# Patient Record
Sex: FEMALE | Race: WHITE | NOT HISPANIC OR LATINO | Employment: FULL TIME | URBAN - METROPOLITAN AREA
[De-identification: names, ages, dates, MRNs, and addresses within clinical notes are randomized per-mention and may not be internally consistent; named-entity substitution may affect disease eponyms.]

---

## 2016-10-26 LAB — CRC RECOMMENDATION EXT: NORMAL

## 2017-02-07 NOTE — TELEPHONE ENCOUNTER
----- Message from Farhana Gordillo sent at 2/7/2017 10:30 AM CST -----  Contact: Ximena/ CRISTIANE/ [phone]   Type: Rx    Name of medication(s): trazodone (DESYREL) 50 MG tablet    Is this a refill? New rx? Refill     Who prescribed medication? Dr. Lewis     Pharmacy Name, Phone, & Location: [facility] on file     Comments: Ximena is calling to have a refill on the medication above. Please call and advise       Thank you

## 2017-02-07 NOTE — TELEPHONE ENCOUNTER
----- Message from Josefaangelina Bourgeois sent at 2/7/2017  1:09 PM CST -----  Contact: Self/[phone] cell  Type: Rx    Name of medication(s): trazodone (DESYREL) 50 MG tablet    Is this a refill? New rx?refill    Who prescribed medication?    Pharmacy Name, Phone, & Location:[facility] on file    Comments:Patient is calling to request a refill on medication above. She would like a new Rx for 90 day supplies be sent to her pharmacy. Please call and advise.    Thank you!

## 2017-02-08 RX ORDER — TRAZODONE HYDROCHLORIDE 50 MG/1
50 TABLET ORAL NIGHTLY
Qty: 90 TABLET | Refills: 3 | Status: SHIPPED | OUTPATIENT
Start: 2017-02-08 | End: 2018-02-13 | Stop reason: SDUPTHER

## 2017-03-14 ENCOUNTER — OFFICE VISIT (OUTPATIENT)
Dept: OTOLARYNGOLOGY | Facility: CLINIC | Age: 47
End: 2017-03-14
Payer: COMMERCIAL

## 2017-03-14 VITALS
SYSTOLIC BLOOD PRESSURE: 125 MMHG | WEIGHT: 167.56 LBS | HEART RATE: 86 BPM | HEIGHT: 64 IN | BODY MASS INDEX: 28.6 KG/M2 | DIASTOLIC BLOOD PRESSURE: 76 MMHG

## 2017-03-14 DIAGNOSIS — J32.2 CHRONIC ETHMOIDAL SINUSITIS: ICD-10-CM

## 2017-03-14 DIAGNOSIS — J34.3 NASAL TURBINATE HYPERTROPHY: ICD-10-CM

## 2017-03-14 DIAGNOSIS — J31.0 CHRONIC RHINITIS: Primary | ICD-10-CM

## 2017-03-14 PROCEDURE — 31231 NASAL ENDOSCOPY DX: CPT | Mod: S$GLB,,, | Performed by: OTOLARYNGOLOGY

## 2017-03-14 PROCEDURE — 99999 PR PBB SHADOW E&M-EST. PATIENT-LVL III: CPT | Mod: PBBFAC,,, | Performed by: OTOLARYNGOLOGY

## 2017-03-14 PROCEDURE — 99213 OFFICE O/P EST LOW 20 MIN: CPT | Mod: 25,S$GLB,, | Performed by: OTOLARYNGOLOGY

## 2017-03-14 PROCEDURE — 1160F RVW MEDS BY RX/DR IN RCRD: CPT | Mod: S$GLB,,, | Performed by: OTOLARYNGOLOGY

## 2017-03-14 RX ORDER — AZELASTINE HYDROCHLORIDE, FLUTICASONE PROPIONATE 137; 50 UG/1; UG/1
1 SPRAY, METERED NASAL 2 TIMES DAILY
Qty: 23 G | Refills: 2 | Status: SHIPPED | OUTPATIENT
Start: 2017-03-14 | End: 2018-03-16

## 2017-03-14 RX ORDER — OMEPRAZOLE 20 MG/1
20 CAPSULE, DELAYED RELEASE ORAL DAILY
COMMUNITY
End: 2018-03-19 | Stop reason: ALTCHOICE

## 2017-03-14 NOTE — PROGRESS NOTES
"  Subjective:      Chaya is a 47 y.o. female who comes for follow-up of CRS.  Nearly 9 months s/p ESS, BITR, PITH rsxn.  Overall fine, had sinus infection in January that resolved.  Now baseline feeling of "swelling" in nasal passage and mildly in the cheeks, greater on left.  Sneezes a couple of times a day at work, not at home.  Stopped flonase and saline, using nothing really now.    SNOT-22 score = 21, NOSE score = 60%    The patient's medications, allergies, past medical, surgical, social and family histories were reviewed and updated as appropriate.    A detailed review of systems was obtained with pertinent positives as per the above HPI, and otherwise negative.        Objective:     /76  Pulse 86  Ht 5' 4" (1.626 m)  Wt 76 kg (167 lb 8.8 oz)  BMI 28.76 kg/m2       Constitutional:   She appears well-developed. She is cooperative.     Head:  Normocephalic.     Nose:  No mucosal edema, rhinorrhea, septal deviation or polyps. No epistaxis. Turbinate hypertrophy.  Turbinates normal and no turbinate masses.  Right sinus exhibits maxillary sinus tenderness. Right sinus exhibits no frontal sinus tenderness. Left sinus exhibits maxillary sinus tenderness. Left sinus exhibits no frontal sinus tenderness.     Mouth/Throat  Oropharynx clear and moist without lesions or asymmetry. No oropharyngeal exudate or posterior oropharyngeal erythema.     Neck:  No adenopathy. Normal range of motion present.     She has no cervical adenopathy.       Procedure    Nasal endoscopy performed.  See procedure note.     Left nasal valve     Left MT with crust     Left ethmoid     Left ET     Right nasal valve     Right MT with crust     Right ethmoid     Right ET        Data Reviewed    WBC (K/uL)   Date Value   04/03/2016 9.34     Eosinophil% (%)   Date Value   04/03/2016 0.7     Eos # (K/uL)   Date Value   04/03/2016 0.1     Platelets (K/uL)   Date Value   04/03/2016 226     Glucose (mg/dL)   Date Value   04/03/2016 84 "     No results found for: IGE    Pathology report indicated non-eosinophilic chronic inflammation.      Assessment:     1. Chronic rhinitis    2. Chronic ethmoidal sinusitis    3. Nasal turbinate hypertrophy         Plan:     Rx Dymista.  Saline rinse regularly and prn.  Call if another infection.  Return if symptoms worsen or fail to improve.

## 2017-03-14 NOTE — PROCEDURES
Nasal/sinus endoscopy  Date/Time: 3/14/2017 9:15 AM  Performed by: DOMO RONQUILLO  Authorized by: DOMO RONQUILLO     Consent Done?:  Yes (Verbal)  Anesthesia:     Local anesthetic:  Lidocaine 4% and Eliecer-Synephrine 1/2%    Patient tolerance:  Patient tolerated the procedure well with no immediate complications  Nose:     Procedure Performed:  Nasal Endoscopy  External:      No external nasal deformity  Intranasal:      Mucosa no polyps     Mucosa ulcers not present     No mucosa lesions present     Enlarged turbinates     No septum gross deformity  Nasopharynx:      No mucosa lesions     Adenoids not present     Posterior choanae patent     Eustachian tube patent     Sinuses all healed.  Mild crusting around MT heads.  ITs edematous.

## 2018-03-15 RX ORDER — TRAZODONE HYDROCHLORIDE 50 MG/1
TABLET ORAL
Qty: 90 TABLET | Refills: 3 | Status: SHIPPED | OUTPATIENT
Start: 2018-03-15 | End: 2021-02-02

## 2018-03-16 ENCOUNTER — OFFICE VISIT (OUTPATIENT)
Dept: ALLERGY | Facility: CLINIC | Age: 48
End: 2018-03-16
Payer: COMMERCIAL

## 2018-03-16 VITALS — BODY MASS INDEX: 26.2 KG/M2 | HEIGHT: 64 IN | WEIGHT: 153.44 LBS

## 2018-03-16 DIAGNOSIS — R76.0 ABNORMAL ANTIBODY TITER: Primary | ICD-10-CM

## 2018-03-16 DIAGNOSIS — J31.0 OTHER CHRONIC RHINITIS: ICD-10-CM

## 2018-03-16 DIAGNOSIS — K21.9 LARYNGOPHARYNGEAL REFLUX: ICD-10-CM

## 2018-03-16 DIAGNOSIS — J32.9 CHRONIC SINUSITIS, UNSPECIFIED LOCATION: ICD-10-CM

## 2018-03-16 DIAGNOSIS — K21.9 GASTROESOPHAGEAL REFLUX DISEASE WITHOUT ESOPHAGITIS: ICD-10-CM

## 2018-03-16 PROCEDURE — 99999 PR PBB SHADOW E&M-EST. PATIENT-LVL III: CPT | Mod: PBBFAC,,, | Performed by: ALLERGY & IMMUNOLOGY

## 2018-03-16 PROCEDURE — 99244 OFF/OP CNSLTJ NEW/EST MOD 40: CPT | Mod: S$GLB,,, | Performed by: ALLERGY & IMMUNOLOGY

## 2018-03-16 RX ORDER — NORETHINDRONE ACETATE AND ETHINYL ESTRADIOL 1MG-20(21)
1 KIT ORAL DAILY
COMMUNITY
End: 2018-04-02

## 2018-03-16 RX ORDER — HYDROXYCHLOROQUINE SULFATE 200 MG/1
200 TABLET, FILM COATED ORAL DAILY
COMMUNITY
End: 2022-12-16

## 2018-03-16 NOTE — LETTER
March 16, 2018      Talia Norris MD  [address]  [address]  [facility] [address]           [facility]  [address]  [facility] [address]  Phone: [phone]  Fax: [phone]          Patient: Chaya Dee   MR Number: [id number]   YOB: 1970   Date of Visit: 3/16/2018       Dear Dr. Talia Norris:    Thank you for referring Chaya Dee to me for evaluation. Attached you will find relevant portions of my assessment and plan of care.    If you have questions, please do not hesitate to call me. I look forward to following Chaya Dee along with you.    Sincerely,    RAÚL Osuna III, MD    Enclosure  CC:  No Recipients    If you would like to receive this communication electronically, please contact [email or web address] or [phone] to request more information on [employer] Link access.    For providers and/or their staff who would like to refer a patient to [facility], please contact us through our one-stop-shop provider referral line, [facility], at [phone].    If you feel you have received this communication in error or would no longer like to receive these types of communications, please e-mail [email or web address]

## 2018-03-16 NOTE — PROGRESS NOTES
"Chaya Dee is referred by Dr. Talia Norris for a consult regarding chronic rhinitis and a possible immunodeficiency. She is here alone.    She has had chronic recurrent rhinitis and frequent sinus infections since childhood. She saw Dr. Christiano Tran at [facility] in the late 1990s and was skin tested. She thinks she only had a reaction to cockroach.    She had her first sinus surgery in the early 2000's. She had surgery with Dr. Donato in July 2009 for a deviated nasal septum and hypertrophied nasal turbinates.    She had another sinus surgery with Dr. Lomeli on June 24, 2016. She continues to have about 2-3 "sinus infections" a year.    She continues to have frequent headaches, pruritus of the nose, eyes, ears, and throat, ear fullness and pressure, sneezing, clear rhinorrhea, postnasal drip, bilateral nasal congestion that alternates, frequent throat clearing, sore throats, hoarseness, and a sensation that something is in the back of the throat. She only occasionally a cough. She denies any wheezing or shortness of breath. She denies any history of asthma.    She has been told twice by a physician in an urgent care center that she had fluid in her ears.    She occasionally has a rash on her chest and face after eating certain foods she thinks that contain MSG. She may also have mild swelling of her lips. She takes Benadryl with resolution.    She does have gastroesophageal reflux disease and takes omeprazole 20 mg a day. If she misses any doses she will have significant heartburn. She may get acid in her mouth. She had an EGD in 2016 that she thinks was normal.    In the past year she developed joint pain and stiffness in multiple joints. She also has had numbness and tingling in her hands and toes. She has had dry eyes or dry mouth. She had lab work with Dr. Norris that showed a low immune protein level. She believes that her rheumatoid factor and DANE were negative. She has been on Plaquenil for several " months with an improvement in her symptoms.    For ROS, FH, SH please see Allergy and Asthma Questionnaire dated today.    Some relevant pertinent positives:    Review of Systems:  She has recurrent nausea, vomiting, diarrhea, and abdominal pain. She associates these with her reflux. She has several nodules on her hands and is followed by Dr. Isabel Mckinney. She had scarlet fever in college.  She has had low vitamin D levels and is taking 5000 units a day as replacement. She also has had a low vitamin B12 and takes replacement injections once a week.    Family History: Her mother has rhinitis and a food allergy.    Home environment: She has lived in the same house for the past 11 years. There was no water damage. There is no evidence of mold. There is carpeting in the bedroom. There is 1 dog that lives in and out of the house.    Social History: She is a nonsmoker. She is an  at [employer]. Her family has a house in [place], [place]. She went to college at [employer].    Physical Examination:  General: Well-developed, well-nourished, no acute distress.  Head: No sinus tenderness.  Eyes: Conjunctivae:  No bulbar or palpebral conjunctival injection.  Ears: EAC's clear.  TM's clear.  No pre-auricular nodes.  Nose: Nasal Mucosa:  Pink.  Septum: No apparent deviation.  Turbinates:  No significant edema.  Polyps/Mass:  None visible.  Teeth/Gums:  No bleeding noted.  Oropharynx: No exudates.  Neck: Supple without thyromegaly. No cervical lymphadenopathy.    Respiratory/Chest: Effort: Good.  Auscultation:  Clear bilaterally.  Cardiovascular:  No murmur, rubs, or gallop heard.   GI:  Non-tender.  No masses.  No organomegaly.  Extremities:  No swelling.  No cyanosis, clubbing, or edema.  Skin: Good turgor.  No urticaria or angioedema.  Neuro/Psych: Oriented x 3.    Laboratory 2018:  CBC: Normal.  CMP: Bilirubin 1.1.  Ig.  IgM: 21.  IgA: 146.  IgG1: 397.  IgG2: 316.  IgG3: 18 (normal   mg/dL).  IgG4: 23.  ESR: 0.  C-reactive protein: 0.6.  Urinalysis: Normal.    Assessment:  1. Chronic rhinitis, consider allergic.  2. Chronic conjunctivitis, consider allergic.  3. Recurrent sinus infections.  4. GERD.  5. 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  7. Unknown rheumatologic disorder on Plaquenil.    Recommendations:  1. Laboratory as ordered.  2. ENT evaluation for LPR.  3. Return to clinic in 2-3 weeks.  4. Letter to Dr. Norris on return to clinic.    LPR and website were reviewed.

## 2018-03-19 ENCOUNTER — OFFICE VISIT (OUTPATIENT)
Dept: OTOLARYNGOLOGY | Facility: CLINIC | Age: 48
End: 2018-03-19
Payer: COMMERCIAL

## 2018-03-19 ENCOUNTER — CLINICAL SUPPORT (OUTPATIENT)
Dept: AUDIOLOGY | Facility: CLINIC | Age: 48
End: 2018-03-19
Payer: COMMERCIAL

## 2018-03-19 VITALS — BODY MASS INDEX: 26.34 KG/M2 | WEIGHT: 154.31 LBS | HEIGHT: 64 IN

## 2018-03-19 DIAGNOSIS — G89.29 CHRONIC INTRACTABLE HEADACHE, UNSPECIFIED HEADACHE TYPE: ICD-10-CM

## 2018-03-19 DIAGNOSIS — R51.9 CHRONIC INTRACTABLE HEADACHE, UNSPECIFIED HEADACHE TYPE: ICD-10-CM

## 2018-03-19 DIAGNOSIS — K21.9 LPRD (LARYNGOPHARYNGEAL REFLUX DISEASE): Primary | ICD-10-CM

## 2018-03-19 DIAGNOSIS — R09.89 CHRONIC THROAT CLEARING: ICD-10-CM

## 2018-03-19 DIAGNOSIS — Z98.890 HISTORY OF ENDOSCOPIC SINUS SURGERY: ICD-10-CM

## 2018-03-19 DIAGNOSIS — R51.9 FACIAL PAIN: ICD-10-CM

## 2018-03-19 DIAGNOSIS — H69.90 DYSFUNCTION OF EUSTACHIAN TUBE, UNSPECIFIED LATERALITY: Primary | ICD-10-CM

## 2018-03-19 DIAGNOSIS — H93.8X3 EAR PRESSURE, BILATERAL: Primary | ICD-10-CM

## 2018-03-19 PROCEDURE — 99213 OFFICE O/P EST LOW 20 MIN: CPT | Mod: 25,S$GLB,, | Performed by: NURSE PRACTITIONER

## 2018-03-19 PROCEDURE — 31575 DIAGNOSTIC LARYNGOSCOPY: CPT | Mod: S$GLB,,, | Performed by: NURSE PRACTITIONER

## 2018-03-19 PROCEDURE — 99999 PR PBB SHADOW E&M-EST. PATIENT-LVL IV: CPT | Mod: PBBFAC,,, | Performed by: NURSE PRACTITIONER

## 2018-03-19 PROCEDURE — 92567 TYMPANOMETRY: CPT | Mod: S$GLB,,, | Performed by: AUDIOLOGIST-HEARING AID FITTER

## 2018-03-19 RX ORDER — OMEPRAZOLE 40 MG/1
40 CAPSULE, DELAYED RELEASE ORAL
Qty: 30 CAPSULE | Refills: 11 | Status: SHIPPED | OUTPATIENT
Start: 2018-03-19 | End: 2021-02-02

## 2018-03-19 NOTE — PATIENT INSTRUCTIONS
"  How Acid Reflux Affects Your Throat    Do you have to clear your throat or cough often? Are you hoarse? Do you have trouble swallowing? If you have these or other throat symptoms, you may have acid reflux. This occurs when stomach acid flows back up and irritates your throat.  Why you have throat symptoms  There are muscles (esophageal sphincters) at both ends of the tube that carries food to your stomach (the esophagus). These muscles relax to let food pass. Then they tighten to keep stomach acid down. When the lower esophageal sphincter (LES) doesnt tighten enough, acid can flow back (reflux) from your stomach into your esophagus. This may cause heartburn. In some cases the upper esophageal sphincter (UES) also doesnt work well. Then acid can travel higher and enter your throat (pharynx). In many cases, this causes throat symptoms.  Common throat symptoms  · Need to clear your throat often  · Feeling like youre choking  · Long-term (chronic) cough  · Hoarseness  · Trouble swallowing  · Feel like you have a lump in your throat  · Sour or acid taste  · Sore throat that keeps coming back     LARYNGOPHARYNGEAL REFLUX  (SILENT OR ATYPICAL REFLUX)    If you have any of the following symptoms you may have laryngopharyngeal reflux (LPR):  hoarseness, thick or too much mucus, chronic throat pain/irritation, chronic throat clearing, chronic cough, especially cough that wake you up from sleep, chronic "postnasal drip" without the need to blow your nose.     Many people with LPR do not have symptoms of heartburn. Compared to the esophagus, the voice box and the back of the throat are significantly more sensitive to the effects of acid on surrounding tissue. Acid passing quickly through the esophagus does not have a chance to irritate the area for too long.  However acid that pools in the throat or voice box can cause prolonged irritation resulting in the symptoms of LPR.    The symptoms of LPR can consist of a dry cough " "and the sensation of something being stuck in the throat.  Some people will complain of heartburn while others may have intermittent hoarseness or loss of voice.  Another major symptom of LPR is "postnasal drip."  Patients are often told symptoms are due to abnormal nasal drainage or sinus infection; however this is rarely the cause of chronic throat irritation. For post nasal drip to cause the complaints described, signs and symptoms of an active nasal infection should be present.     Treatments for LPR include:  postural changes, weight reduction, diet modification, medication to reduce stomach acid and promote normal motility, and surgery to prevent reflux. Most patients will begin to notice some relief in her symptoms about 2 weeks after starting the medication; however it is generally recommended the medication should be continued for 2 months. If the symptoms completely resolve, the medication can then be tapered.  Some people will remain symptom free while others may have relapses which required treatment again.    Things you can do to prevent reflux include:  Do not smoke.  Smoking will cause reflux.  Avoid tight fitting clothes or belts around the waist.  Avoid eating at least 2 hours prior to bedtime.  In fact avoid eating your largest meal at night.  Weight loss.  For patient's with recent weight gain, shedding a few pounds is all that is required to improve reflux.  Avoid caffeine, cola beverages, citrus beverages, mints, alcoholic beverages, particularly at night, cheese, fried foods, spicy foods, eggs, and chocolate.  Sleep with the head of bed elevated at least 6 inches.    Recommendations:    Take Nexium / Prilosec (PPI) every morning on an empty stomach (30-60 minutes before eating) 40 MG.   At bedtime take Zantac (H2-blocker) 150-300 mg.    After 4-8 weeks, with significant symptomatic improvement, you may begin weaning your reflux medications down:  Nexium / Prilosec 40 mg --> to 20 mg " (over-the-counter strength)  Zantac 300 mg --> to 150 mg (over-the-counter stength)  See a Gastro doctor (GI) for refractory symptoms and continued management.

## 2018-03-19 NOTE — LETTER
March 19, 2018      RAÚL Osuna III, MD  [address]  [facility] [address]           [facility]  [address] [facility] [address] [place] [address]  Phone: [phone]  Fax: [phone]          Patient: Chaya Dee   MR Number: [id number]   YOB: 1970   Date of Visit: 3/19/2018       Dear Dr. RAÚL Osuna III:    Thank you for referring Chaya Dee to me for evaluation. Attached you will find relevant portions of my assessment and plan of care.    If you have questions, please do not hesitate to call me. I look forward to following Chaya Dee along with you.    Sincerely,    Bronwyn Mariee, ESPERANZA    Enclosure  CC:  No Recipients    If you would like to receive this communication electronically, please contact [email or web address] or [phone] to request more information on [employer] Link access.    For providers and/or their staff who would like to refer a patient to [facility], please contact us through our one-stop-shop provider referral line, [facility], at [phone].    If you feel you have received this communication in error or would no longer like to receive these types of communications, please e-mail [email or web address]

## 2018-03-19 NOTE — PROGRESS NOTES
Subjective:       Patient ID: Chaya Dee is a 48 y.o. female.    Chief Complaint: No chief complaint on file.    HPI   Patient is referred by Dr. Osuna for consultation for suspected LPR.   H/o sinus surgery in early 2000's. H/o septoplasty and turbinate reduction by Dr. Hopkins in July 2009. H/o 2nd FESS and 2nd turbinate reduction by Dr. Lomeli in June 2016. Patient was last seen by Dr. Lomeli one year ago, given Dymista and encouraged to continue rinses. No recent sinus imaging. No recent antibiotics listed in Epic chart.  Patient states she always feels swollen and congested, though she is able to breathe well through her nose. She reports when she leans forward with her head down, clear liquid drips freely from her nose. She suffers with sinus headaches once to twice per week, which is an improvement from before her last sinus surgery with Dr. Lomeli when she was having daily headaches for three straight months.     She was evaluated by Dr. Osuna three days ago for chronic recurrent rhinitis and frequent sinus infections. She reported 2-3 sinus infections per year. She reported frequent headaches, pruritus of the nose, eyes, ears, and throat, ear fullness and pressure, sneezing, clear rhinorrhea, postnasal drip, bilateral nasal congestion that alternates, frequent throat clearing, sore throats, hoarseness, and a sensation that something is in the back of the throat. She only occasionally a cough. She denies any wheezing or shortness of breath. She denies any history of asthma.  She has been told twice by a physician in an urgent care center that she had fluid in her ears.    She does have gastroesophageal reflux disease. If she misses a dose she has significant dyspepsia (heartburn, acid in her mouth). She had an EGD done at [facility] in 2016 that she states it showed an ulcer in her duodenum. She states she has been on 20 mg of omeprazole daily since then. She clears her throat constantly, and has a  constant phlegm sensation in the back of her throat.     Her RAST testing 2 days ago revealed low IgE and no responses to dogs, cockroaches, dust mites (most results still pending).     Review of Systems   Constitutional: Negative.    HENT: Positive for postnasal drip and voice change.         Sensation of thick or too much mucus in the back of the throat  Frequent throat irritation  Constant throat clearing  Sensation of lump or swelling in the back of the throat   Eyes: Negative.    Respiratory: Negative.    Cardiovascular: Negative.    Gastrointestinal: Negative.    Musculoskeletal: Negative.    Skin: Negative.    Neurological: Negative.    Hematological: Negative.    Psychiatric/Behavioral: Negative.        Objective:      Physical Exam   Constitutional: She is oriented to person, place, and time. Vital signs are normal. She appears well-developed and well-nourished. She is cooperative. She does not appear ill. No distress.   HENT:   Head: Normocephalic and atraumatic.   Right Ear: Hearing, tympanic membrane, external ear and ear canal normal. Tympanic membrane is not erythematous. Tympanic membrane mobility is normal. No middle ear effusion.   Left Ear: Hearing, tympanic membrane, external ear and ear canal normal. Tympanic membrane is not erythematous. Tympanic membrane mobility is normal.  No middle ear effusion.   Nose: Nose normal. No mucosal edema or rhinorrhea. Right sinus exhibits no maxillary sinus tenderness and no frontal sinus tenderness. Left sinus exhibits no maxillary sinus tenderness and no frontal sinus tenderness.   Mouth/Throat: Uvula is midline, oropharynx is clear and moist and mucous membranes are normal. Mucous membranes are not pale, not dry and not cyanotic. No oral lesions. No oropharyngeal exudate, posterior oropharyngeal edema or posterior oropharyngeal erythema.   Eyes: Conjunctivae, EOM and lids are normal. Pupils are equal, round, and reactive to light. Right eye exhibits no  discharge. Left eye exhibits no discharge. No scleral icterus.   Neck: Trachea normal and normal range of motion. Neck supple. No tracheal deviation present. No thyroid mass and no thyromegaly present.   Cardiovascular: Normal rate.    Pulmonary/Chest: Effort normal. No stridor. No respiratory distress. She has no wheezes.   Musculoskeletal: Normal range of motion.   Lymphadenopathy:        Head (right side): No submental, no submandibular, no tonsillar, no preauricular and no posterior auricular adenopathy present.        Head (left side): No submental, no submandibular, no tonsillar, no preauricular and no posterior auricular adenopathy present.     She has no cervical adenopathy.        Right cervical: No superficial cervical and no posterior cervical adenopathy present.       Left cervical: No superficial cervical and no posterior cervical adenopathy present.   Neurological: She is alert and oriented to person, place, and time. She has normal strength. Coordination and gait normal.   Skin: Skin is warm, dry and intact. No lesion and no rash noted. She is not diaphoretic. No cyanosis. No pallor.   Psychiatric: She has a normal mood and affect. Her speech is normal and behavior is normal. Judgment and thought content normal. Cognition and memory are normal.   Nursing note and vitals reviewed.      Procedure: Flexible laryngoscopy    In order to fully examine the upper aerodigestive tract, including the larynx, in a patient with a hyperactive gag reflex, and suboptimal visualization with indirect mirror exam,  flexible endoscopy is required.   After explaining the procedure and obtaining verbal consent, a timeout was performed with the patient's participation according to the universal protocol. Both nasal cavities were anesthetized with 4% Xylocaine spray mixed with Eliecer-Synephrine. The flexible laryngoscope  was inserted into the nasal cavity and advanced to visualize the nasal cavity, nasopharynx, the posterior  oropharynx, hypopharynx, and the endolarynx with the  findings noted. The scope was removed and the procedure terminated. The patient tolerated this procedure well without apparent complication.     OVERALL FINDINGS  Nasopharynx - the torus is clear. There are no lesions of the posterior wall.   Oropharynx - no lesions of the tongue base. There is no obvious fullness or asymmetry.  Hypopharynx - there are no lesions of the pyriform sinuses or postcricoid region   Larynx - there are no lesions of the supraglottic or glottic larynx.  Vocal fold mobility is normal.     SPECIFIC FINDINGS  Adenoid tissue - normal   Nasopharynx & eustachian tube orifices - normal   Posterior pharyngeal wall - normal   Base of tongue - normal   Epiglottis - normal   Valleculae - normal   Pyriform sinuses - normal   False vocal cords - normal   True vocal cords - normal  Arytenoids - normal   Interarytenoid space - erythema, edema  Left middle meatus, synechiae    Left antrostomy window at top right of frame, choana at bottom left of frame    Right middle meatus with post-surgical changes    Right choana at bottom of frame    Larynx    Larynx with marked reflux changes posteriorly    Assessment:     S/P FESS with turbinate resection    LPRD/GERD  Facial pain and headaches, may need neuro eval if imaging rules out sinuses  Plan:     Dymista and nasal saline rinses. CT medtronic sinuses and f/u with Dr. Lomeli (states when she leans forward with her head down, clear liquid drips freely from her nose)    Laryngoscope photos given and reviewed in detail with patient. Handouts given on LPRD and GERD, and discussed at length with patient. Recommend omeprazole 40 mg QAM on an empty stomach, ranitidine 300 mg QHS, and follow up with GI for refractory symptoms and continued management.   F/u with Dr. Osuna as scheduled for immunological/allergy review

## 2018-03-19 NOTE — PROGRESS NOTES
Chaya Dee was seen 03/19/2018 for tympanometry per order from Bronwyn Mariee due to complaint of urgent care saying she has fluid in her ears. Results indicate Type A for the right ear indicating normal middle ear function and Type A for the left ear indicating normal middle ear function.     Results will be reviewed by ENT following this encounter.

## 2018-03-26 ENCOUNTER — HOSPITAL ENCOUNTER (OUTPATIENT)
Dept: RADIOLOGY | Facility: HOSPITAL | Age: 48
Discharge: HOME | End: 2018-03-26
Attending: NURSE PRACTITIONER
Payer: COMMERCIAL

## 2018-03-26 DIAGNOSIS — R51.9 CHRONIC INTRACTABLE HEADACHE, UNSPECIFIED HEADACHE TYPE: ICD-10-CM

## 2018-03-26 DIAGNOSIS — G89.29 CHRONIC INTRACTABLE HEADACHE, UNSPECIFIED HEADACHE TYPE: ICD-10-CM

## 2018-03-26 DIAGNOSIS — Z98.890 HISTORY OF ENDOSCOPIC SINUS SURGERY: ICD-10-CM

## 2018-03-26 DIAGNOSIS — R51.9 FACIAL PAIN: ICD-10-CM

## 2018-03-26 PROCEDURE — 70486 CT MAXILLOFACIAL W/O DYE: CPT | Mod: 26,,, | Performed by: RADIOLOGY

## 2018-03-26 PROCEDURE — 70486 CT MAXILLOFACIAL W/O DYE: CPT | Mod: TC

## 2018-04-02 ENCOUNTER — OFFICE VISIT (OUTPATIENT)
Dept: ALLERGY | Facility: CLINIC | Age: 48
End: 2018-04-02
Payer: COMMERCIAL

## 2018-04-02 VITALS
BODY MASS INDEX: 26.49 KG/M2 | SYSTOLIC BLOOD PRESSURE: 112 MMHG | HEIGHT: 64 IN | WEIGHT: 155.19 LBS | DIASTOLIC BLOOD PRESSURE: 72 MMHG

## 2018-04-02 DIAGNOSIS — R76.0 ABNORMAL ANTIBODY TITER: Primary | ICD-10-CM

## 2018-04-02 DIAGNOSIS — K21.9 GASTROESOPHAGEAL REFLUX DISEASE WITHOUT ESOPHAGITIS: ICD-10-CM

## 2018-04-02 DIAGNOSIS — H10.423 SIMPLE CHRONIC CONJUNCTIVITIS OF BOTH EYES: ICD-10-CM

## 2018-04-02 DIAGNOSIS — J31.0 OTHER CHRONIC RHINITIS: ICD-10-CM

## 2018-04-02 DIAGNOSIS — K21.9 LARYNGOPHARYNGEAL REFLUX: ICD-10-CM

## 2018-04-02 PROCEDURE — 99999 PR PBB SHADOW E&M-EST. PATIENT-LVL III: CPT | Mod: PBBFAC,,, | Performed by: ALLERGY & IMMUNOLOGY

## 2018-04-02 PROCEDURE — 90732 PPSV23 VACC 2 YRS+ SUBQ/IM: CPT | Mod: S$GLB,,, | Performed by: ALLERGY & IMMUNOLOGY

## 2018-04-02 PROCEDURE — 99214 OFFICE O/P EST MOD 30 MIN: CPT | Mod: 25,S$GLB,, | Performed by: ALLERGY & IMMUNOLOGY

## 2018-04-02 PROCEDURE — 90471 IMMUNIZATION ADMIN: CPT | Mod: S$GLB,,, | Performed by: ALLERGY & IMMUNOLOGY

## 2018-04-02 RX ORDER — NORETHINDRONE ACETATE AND ETHINYL ESTRADIOL 1MG-20(21)
1 KIT ORAL DAILY
COMMUNITY
End: 2018-06-05

## 2018-04-02 NOTE — PROGRESS NOTES
Chaya Dee returns to clinic today for continued evaluation of chronic rhinitis and a possible immunodeficiency. She is here alone. She was last seen April 16, 2018.    After her last visit, she saw NP Bronwyn Mariee who did see evidence of LPR. She started her on omeprazole 40 mg a day and ranitidine 300 mg at night. She also ordered a sinus CT.    She continues to have chronic rhinitis with pruritus of the nose, eyes, ears, and throat, ear fullness and pressure, sneezing, clear rhinorrhea, postnasal drip, nasal congestion, frequent throat clearing, sore throats, and a sensation that something is in the back of the throat.    She is scheduled to see Dr. Lomeli later this month.    She had follow-up with Dr. Norris and had more lab drawn. She continues to take Plaquenil.    She continues to have intermittent indigestion.    [facility]GY QUESTIONNAIRE SHORT 3/30/2018   Are you taking any new medications since your last visit? Yes   Constitution: No changes since my last visit with this provider   Head or facial pain: Headaches, Sinus pressure   Eyes: Itching, Contacts   Ears: Fullness   Nose: Post nasal drip, Sneezing, Nasal surgery   Throat: Sore throat, Hoarseness, Frequent clearing, Infections, Reflux/ heartburn   Sinuses: Sinus infections, Sinus surgery, CT scan   Lungs: No symptoms   Skin: No symptoms   Cardiovascular: No symptoms   Gastrointestinal: Heartburn/ indigestion/ reflux   Genital/ urinary No symptoms   Musculoskeletal: Back pain, Joint pain, Neck pain, Joint swelling   Neurologic: Headaches, Numbness   Endocrine: Headaches, Numbness   Hematologic: Bruises/ bleeds easily     Physical Examination:  General: Well-developed, well-nourished, no acute distress.  Head: No sinus tenderness.  Eyes: Conjunctivae:  No bulbar or palpebral conjunctival injection.  Ears: EAC's clear.  TM's clear.  No pre-auricular nodes.  Nose: Nasal Mucosa:  Pink.  Septum: No apparent deviation.  Turbinates:  No significant  edema.  Polyps/Mass:  None visible.  Teeth/Gums:  No bleeding noted.  Oropharynx: No exudates.  Neck: Supple without thyromegaly. No cervical lymphadenopathy.    Respiratory/Chest: Effort: Good.  Auscultation:  Clear bilaterally.  Skin: Good turgor.  No urticaria or angioedema.  Neuro/Psych: Oriented x 3.    Laboratory 2018:  CBC: Normal.  CMP: Bilirubin 1.1.  Ig.  IgM: 21.  IgA: 146.  IgG1: 397.  IgG2: 316.  IgG3: 18 (normal  mg/dL).  IgG4: 23.  ESR: 0.  C-reactive protein: 0.6.  Urinalysis: Normal.    Laboratory 3/16/2018:  IgE level: Less than 35.  ImmunoCAP: Negative.  IgA: 146.  IgM: 20 (normal ).  Ig.  IgG subclasses:  IgG1 368 (normal 382-929).  IgG2: 289 (normal 242-700).  IgG3: 22 (normal .  IgG4: 19 (normal 4-86).   Pneumococcal titers: Not protective.    Sinus CT 3/19/2018:  Operative change status post uncinectomy, middle turbinectomy and partial ethmoidectomies  Trace mucosal thickening in the floor of the left maxillary antra and right anterior residual ethmoid air cells as detailed above.    Laboratory 3/27/2018 (Dr. Norris):  CBC: Normal.  CMP: Normal.  C-reactive protein: 0.4 (normal 0-0.3).  Urinalysis: Normal.    Assessment:  1. Chronic rhinitis, doubt allergic.  2. Chronic conjunctivitis, doubt allergic.  3. Recurrent sinus infections, with normal sinus CT 3/19/2018.  4. GERD.  5. LPR.  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  7. Low pneumococcal titers.  8. Unknown rheumatologic disorder on Plaquenil.    Recommendations:  1. Pneumovax today.  2. Recheck titers in 6 weeks.  3. She does not remember when her last tetanus shot was. Tetanus titers will also be checked.  4. Continue omeprazole 40 mg a day.  5. Continue ranitidine 300 mg at night.  6. Follow rhinitis on above.  7. Dr. Lomeli follow-up as scheduled.  8. Return to clinic in 2 months.  9. Letter to Dr. Norris on return to clinic.

## 2018-04-24 ENCOUNTER — OFFICE VISIT (OUTPATIENT)
Dept: OTOLARYNGOLOGY | Facility: CLINIC | Age: 48
End: 2018-04-24
Payer: COMMERCIAL

## 2018-04-24 VITALS
WEIGHT: 156.5 LBS | HEART RATE: 81 BPM | BODY MASS INDEX: 26.87 KG/M2 | DIASTOLIC BLOOD PRESSURE: 73 MMHG | SYSTOLIC BLOOD PRESSURE: 106 MMHG

## 2018-04-24 DIAGNOSIS — G89.29 CHRONIC INTRACTABLE HEADACHE, UNSPECIFIED HEADACHE TYPE: Primary | ICD-10-CM

## 2018-04-24 DIAGNOSIS — R51.9 CHRONIC INTRACTABLE HEADACHE, UNSPECIFIED HEADACHE TYPE: Primary | ICD-10-CM

## 2018-04-24 DIAGNOSIS — J31.0 NONALLERGIC RHINITIS: ICD-10-CM

## 2018-04-24 DIAGNOSIS — K21.9 LPRD (LARYNGOPHARYNGEAL REFLUX DISEASE): ICD-10-CM

## 2018-04-24 PROCEDURE — 99999 PR PBB SHADOW E&M-EST. PATIENT-LVL III: CPT | Mod: PBBFAC,,, | Performed by: OTOLARYNGOLOGY

## 2018-04-24 PROCEDURE — 31231 NASAL ENDOSCOPY DX: CPT | Mod: S$GLB,,, | Performed by: OTOLARYNGOLOGY

## 2018-04-24 PROCEDURE — 99213 OFFICE O/P EST LOW 20 MIN: CPT | Mod: 25,S$GLB,, | Performed by: OTOLARYNGOLOGY

## 2018-04-24 NOTE — PROCEDURES
Nasal/sinus endoscopy  Date/Time: 4/24/2018 2:08 PM  Performed by: DOMO RONQUILLO  Authorized by: DOMO RONQUILLO     Consent Done?:  Yes (Verbal)  Anesthesia:     Local anesthetic:  Lidocaine 4% and Eliecer-Synephrine 1/2%    Patient tolerance:  Patient tolerated the procedure well with no immediate complications  Nose:     Procedure Performed:  Nasal Endoscopy  External:      No external nasal deformity  Intranasal:      Mucosa no polyps     Mucosa ulcers not present     No mucosa lesions present     Turbinates not enlarged     No septum gross deformity  Nasopharynx:      No mucosa lesions     Adenoids not present     Posterior choanae patent     Eustachian tube patent     Sinuses all patent bilaterally.  Mild erythema of MT and ET bilaterally.  No polyps or purulence.  Scant posterior mucus flow.  Saliva in nasopharynx.

## 2018-04-24 NOTE — PROGRESS NOTES
"  Subjective:      Chaya is a 48 y.o. female who comes for follow-up of sinusitis.  Her last visit with me was on 3/14/2017.  Now nearly 2 years status-post endoscopic sinus surgery.   Ongoing issues since then with daily, moderate severity, bilateral facial and frontal headaches, not improved with steroid sprays and antihistamines.  Also feels "congested" though able to breathe well.  No hyposmia, minimal nasal mucus.  Using omeprazole and zantac daily for LPR.  On Plaquenil for ambiguous rheum condition.    SNOT-22 score = 32, NOSE score = 40%, ETDQ-7 score = 2.4    The patient's medications, allergies, past medical, surgical, social and family histories were reviewed and updated as appropriate.    A detailed review of systems was obtained with pertinent positives as per the above HPI, and otherwise negative.        Objective:     /73   Pulse 81   Wt 71 kg (156 lb 8.4 oz)   BMI 26.87 kg/m²        Constitutional:   She appears well-developed. She is cooperative.     Head:  Normocephalic.     Nose:  No mucosal edema, rhinorrhea, septal deviation or polyps. No epistaxis. Turbinates normal, no turbinate masses and no turbinate hypertrophy.  Right sinus exhibits no maxillary sinus tenderness and no frontal sinus tenderness. Left sinus exhibits no maxillary sinus tenderness and no frontal sinus tenderness.     Mouth/Throat  Oropharynx clear and moist without lesions or asymmetry. No oropharyngeal exudate or posterior oropharyngeal erythema.     Neck:  No adenopathy. Normal range of motion present.     She has no cervical adenopathy.       Procedure    Nasal endoscopy performed.  See procedure note.     Left MT     Left sinuses clear     Left ET with erythema     Right MT     Right sinuses clear     Right posterior mucus flow     Right ET with erythema     Posterior wall saliva/mucus        Data Reviewed    WBC (K/uL)   Date Value   04/03/2016 9.34     Eosinophil% (%)   Date Value   04/03/2016 0.7     Eos # " (K/uL)   Date Value   2016 0.1     Platelets (K/uL)   Date Value   2016 226     Glucose (mg/dL)   Date Value   2016 84     IgE (IU/mL)   Date Value   2018 <35     Laboratory 3/16/2018:  IgE level: Less than 35.  ImmunoCAP: Negative.  IgA: 146.  IgM: 20 (normal ).  Ig.  IgG subclasses:  IgG1 368 (normal 382-929).  IgG2: 289 (normal 242-700).  IgG3: 22 (normal .  IgG4: 19 (normal 4-86).   Pneumococcal titers: Not protective.    Pathology report indicated non-eosinophilic chronic inflammation.          I independently reviewed the images of the CT sinuses dated 3/26/18. Pertinent findings include all sinuses clear and patent.      Assessment:     1. Chronic intractable headache, unspecified headache type    2. Nonallergic rhinitis    3. LPRD (laryngopharyngeal reflux disease)         Plan:     Discussed absence of any objective sign of sinus inflammation or blockage.  Minor signs of inflammation are present in nasal passage and nasopharynx, possibly due to reflux.  Headaches unlikely sinus origin, may be symptomatic from autonomic component of CNS process.  Recommended neurology evaluation, will refer to Dr. Hurt.  F/U with Dr. Osuna and rheumatologist as planned.  Follow-up if symptoms worsen or fail to improve.

## 2018-05-15 ENCOUNTER — LAB VISIT (OUTPATIENT)
Dept: LAB | Facility: HOSPITAL | Age: 48
End: 2018-05-15
Attending: ALLERGY & IMMUNOLOGY
Payer: COMMERCIAL

## 2018-05-15 DIAGNOSIS — R76.0 ABNORMAL ANTIBODY TITER: ICD-10-CM

## 2018-05-15 PROCEDURE — 86317 IMMUNOASSAY INFECTIOUS AGENT: CPT | Mod: 59

## 2018-05-15 PROCEDURE — 86317 IMMUNOASSAY INFECTIOUS AGENT: CPT

## 2018-05-15 PROCEDURE — 36415 COLL VENOUS BLD VENIPUNCTURE: CPT

## 2018-05-17 LAB
DIPHTHERIA IGG VALUE: 0.04 IU/ML
DIPHTHERIA TOXOID IGG ANTIBODY: POSITIVE
TETANUS TOXOID IGG AB: POSITIVE
TETANUS TOXOID IGG VALUE: 0.9 IU/ML

## 2018-05-18 LAB
DEPRECATED S PNEUM 1 IGG SER-MCNC: 8.1 MCG/ML
DEPRECATED S PNEUM12 IGG SER-MCNC: 0.4 MCG/ML
DEPRECATED S PNEUM14 IGG SER-MCNC: 1.6 MCG/ML
DEPRECATED S PNEUM19 IGG SER-MCNC: 1.8 MCG/ML
DEPRECATED S PNEUM23 IGG SER-MCNC: 0.5 MCG/ML
DEPRECATED S PNEUM3 IGG SER-MCNC: 1.8 MCG/ML
DEPRECATED S PNEUM4 IGG SER-MCNC: 1.1 MCG/ML
DEPRECATED S PNEUM5 IGG SER-MCNC: 2.8 MCG/ML
DEPRECATED S PNEUM8 IGG SER-MCNC: 3.9 MCG/ML
DEPRECATED S PNEUM9 IGG SER-MCNC: 0.8 MCG/ML
S PNEUM DA 18C IGG SER-MCNC: 3.3 MCG/ML
S PNEUM DA 6B IGG SER-MCNC: 1.3 MCG/ML
S PNEUM DA 7F IGG SER-MCNC: 12.1 MCG/ML
S PNEUM DA 9V IGG SER-MCNC: 0.4 MCG/ML

## 2018-06-05 ENCOUNTER — TELEPHONE (OUTPATIENT)
Dept: ALLERGY | Facility: CLINIC | Age: 48
End: 2018-06-05

## 2018-06-05 ENCOUNTER — OFFICE VISIT (OUTPATIENT)
Dept: ALLERGY | Facility: CLINIC | Age: 48
End: 2018-06-05
Payer: COMMERCIAL

## 2018-06-05 VITALS
HEIGHT: 64 IN | BODY MASS INDEX: 27.1 KG/M2 | SYSTOLIC BLOOD PRESSURE: 102 MMHG | WEIGHT: 158.75 LBS | DIASTOLIC BLOOD PRESSURE: 72 MMHG

## 2018-06-05 DIAGNOSIS — J31.0 CHRONIC RHINITIS: ICD-10-CM

## 2018-06-05 DIAGNOSIS — K21.9 LARYNGOPHARYNGEAL REFLUX: ICD-10-CM

## 2018-06-05 DIAGNOSIS — R76.0 ABNORMAL ANTIBODY TITER: Primary | ICD-10-CM

## 2018-06-05 PROCEDURE — 99999 PR PBB SHADOW E&M-EST. PATIENT-LVL III: CPT | Mod: PBBFAC,,, | Performed by: ALLERGY & IMMUNOLOGY

## 2018-06-05 PROCEDURE — 99214 OFFICE O/P EST MOD 30 MIN: CPT | Mod: S$GLB,,, | Performed by: ALLERGY & IMMUNOLOGY

## 2018-06-05 RX ORDER — LEVONORGESTREL AND ETHINYL ESTRADIOL 0.15-0.03
KIT ORAL
COMMUNITY
Start: 2018-06-04 | End: 2022-03-30

## 2018-06-05 RX ORDER — CHOLECALCIFEROL (VITAMIN D3) 25 MCG
5000 TABLET ORAL DAILY
COMMUNITY
End: 2021-02-05 | Stop reason: SDUPTHER

## 2018-06-05 RX ORDER — CYANOCOBALAMIN 1000 UG/ML
INJECTION, SOLUTION INTRAMUSCULAR; SUBCUTANEOUS
Refills: 0 | Status: ON HOLD | COMMUNITY
Start: 2018-05-12 | End: 2023-10-05 | Stop reason: HOSPADM

## 2018-06-05 NOTE — PROGRESS NOTES
Chaya Dee returns to clinic today for continued evaluation of chronic rhinitis and possible immunodeficiency.  She is here alone.  She was last seen April 2, 2018.    She saw NP Bronwyn Mariee on March 19, 2018.  She was diagnosed with LPR and started on omeprazole 40 mg in the morning and ranitidine 300 mg at night.    Since that time she has had continued improvement in her rhinitis particularly her throat clearing.  She does continue to have some mild symptoms but it is much better than before she started these medications.    After her last visit, she did see Dr. Lomeli in ENT.  He reviewed her sinus CT scan and performed another laryngoscopy.  He advised her to continue on her LPR medications.  He did not find any evidence of sinusitis.    About a month ago she developed pain in 1 of her upper teeth.  She saw her dentist who did an x-ray that was consistent with sinusitis.  She prescribed a Z-Bertram and her symptoms did improve.  She then again developed pain in this tooth but it has been intermittent.  She has not had any in a week.    She had a Pneumovax and did respond with improved titers.    She continues to have pain in both hands.  She is to see Dr. Norris in several weeks.    [facility] QUESTIONNAIRE SHORT 5/29/2018   Are you taking any new medications since your last visit? No   Constitution: No symptoms   Head or facial pain: Headaches, Sinus pressure   Eyes: No symptoms   Ears: Fullness   Nose: Post nasal drip   Throat: Sore throat, Hoarseness, Reflux/ heartburn   Sinuses: Sinus infections   Lungs: No symptoms   Skin: No symptoms   Cardiovascular: No symptoms   Gastrointestinal: Abdominal bloating, Heartburn/ indigestion/ reflux   Genital/ urinary No symptoms   Musculoskeletal: No symptoms   Neurologic: No symptoms   Endocrine: No symptoms   Hematologic: No symptoms     Physical Examination:  General: Well-developed, well-nourished, no acute distress.  Head: No sinus tenderness.  Eyes:  Conjunctivae:  No bulbar or palpebral conjunctival injection.  Ears: EAC's clear.  TM's clear.  No pre-auricular nodes.  Nose: Nasal Mucosa:  Pink.  Septum: No apparent deviation.  Turbinates:  No significant edema.  Polyps/Mass:  None visible.  Teeth/Gums:  No bleeding noted.  Oropharynx: No exudates.  Neck: Supple without thyromegaly. No cervical lymphadenopathy.    Respiratory/Chest: Effort: Good.  Auscultation:  Clear bilaterally.  Skin: Good turgor.  No urticaria or angioedema.  Neuro/Psych: Oriented x 3.    Laboratory 2018:  CBC: Normal.  CMP: Bilirubin 1.1.  Ig.  IgM: 21.  IgA: 146.  IgG1: 397.  IgG2: 316.  IgG3: 18 (normal  mg/dL).  IgG4: 23.  ESR: 0.  C-reactive protein: 0.6.  Urinalysis: Normal.    Laboratory 3/16/2018:  IgE level: Less than 35.  ImmunoCAP: Negative.  IgA: 146.  IgM: 20 (normal ).  Ig.  IgG subclasses:  IgG1 368 (normal 382-929).  IgG2: 289 (normal 242-700).  IgG3: 22 (normal .  IgG4: 19 (normal 4-86).   Pneumococcal titers: Not protective.    Sinus CT 3/19/2018:  Operative change status post uncinectomy, middle turbinectomy and partial ethmoidectomies  Trace mucosal thickening in the floor of the left maxillary antra and right anterior residual ethmoid air cells as detailed above.    Laboratory 3/27/2018 (Dr. Norris):  CBC: Normal.  CMP: Normal.  C-reactive protein: 0.4 (normal 0-0.3).  Urinalysis: Normal.    Laboratory 05/15/2018:  Pneumococcal titers:  Improved.  Protected to diphtheria and tetanus.    Assessment:  1. Chronic rhinitis, doubt allergic, improved.  2. Chronic conjunctivitis, doubt allergic, improved.  3. Recurrent sinus infections, with normal sinus CT 3/19/2018.  4. GERD.  5. LPR.  6. Low IgM level with no clinical significance.  7. Low pneumococcal titers, improved.  8. Unknown rheumatologic disorder on Plaquenil.    Recommendations:  1.  Continue omeprazole 40 mg a day.  2.  Ranitidine 300 mg at night.  3.  Follow symptoms on  above.  4.  Return to clinic in 2-3 months or sooner if needed.  5.  Letter with the lab results to Dr. Norris.    6.  Z-Bertram if needed for recurrent tooth pain.  She will call and may need to return to clinic if pain is persistent.  7.  Consider skin testing in the future if needed.

## 2018-06-05 NOTE — TELEPHONE ENCOUNTER
Mailed all office notes and labs to Dr. Norris as requested by Dr. Osuna.    [facility], [facility] [address]

## 2018-11-05 DIAGNOSIS — K21.9 LPRD (LARYNGOPHARYNGEAL REFLUX DISEASE): ICD-10-CM

## 2018-11-27 DIAGNOSIS — K21.9 LPRD (LARYNGOPHARYNGEAL REFLUX DISEASE): ICD-10-CM

## 2018-11-27 RX ORDER — OMEPRAZOLE 40 MG/1
40 CAPSULE, DELAYED RELEASE ORAL
Qty: 90 CAPSULE | Refills: 3 | OUTPATIENT
Start: 2018-11-27 | End: 2019-11-27

## 2019-01-21 DIAGNOSIS — K21.9 LPRD (LARYNGOPHARYNGEAL REFLUX DISEASE): ICD-10-CM

## 2019-01-21 RX ORDER — OMEPRAZOLE 40 MG/1
40 CAPSULE, DELAYED RELEASE ORAL
Qty: 90 CAPSULE | Refills: 0 | OUTPATIENT
Start: 2019-01-21 | End: 2020-01-21

## 2019-02-06 DIAGNOSIS — K21.9 LPRD (LARYNGOPHARYNGEAL REFLUX DISEASE): ICD-10-CM

## 2019-02-06 RX ORDER — OMEPRAZOLE 40 MG/1
CAPSULE, DELAYED RELEASE ORAL
Qty: 30 CAPSULE | Refills: 7 | OUTPATIENT
Start: 2019-02-06

## 2019-02-10 RX ORDER — TRAZODONE HYDROCHLORIDE 50 MG/1
TABLET ORAL
Qty: 90 TABLET | Refills: 3 | OUTPATIENT
Start: 2019-02-10

## 2019-11-22 DIAGNOSIS — K21.9 LPRD (LARYNGOPHARYNGEAL REFLUX DISEASE): ICD-10-CM

## 2020-07-07 ENCOUNTER — OFFICE VISIT (OUTPATIENT)
Dept: OTOLARYNGOLOGY | Facility: CLINIC | Age: 50
End: 2020-07-07
Payer: COMMERCIAL

## 2020-07-07 VITALS — HEART RATE: 81 BPM | SYSTOLIC BLOOD PRESSURE: 124 MMHG | DIASTOLIC BLOOD PRESSURE: 83 MMHG

## 2020-07-07 DIAGNOSIS — J31.0 CHRONIC RHINITIS: Primary | ICD-10-CM

## 2020-07-07 DIAGNOSIS — J32.8 OTHER CHRONIC SINUSITIS: ICD-10-CM

## 2020-07-07 PROCEDURE — 99213 PR OFFICE/OUTPT VISIT, EST, LEVL III, 20-29 MIN: ICD-10-PCS | Mod: S$GLB,,, | Performed by: OTOLARYNGOLOGY

## 2020-07-07 PROCEDURE — 99213 OFFICE O/P EST LOW 20 MIN: CPT | Mod: S$GLB,,, | Performed by: OTOLARYNGOLOGY

## 2020-07-07 PROCEDURE — 99999 PR PBB SHADOW E&M-EST. PATIENT-LVL III: ICD-10-PCS | Mod: PBBFAC,,, | Performed by: OTOLARYNGOLOGY

## 2020-07-07 PROCEDURE — 99999 PR PBB SHADOW E&M-EST. PATIENT-LVL III: CPT | Mod: PBBFAC,,, | Performed by: OTOLARYNGOLOGY

## 2020-07-07 RX ORDER — CELECOXIB 200 MG/1
200 CAPSULE ORAL
COMMUNITY
End: 2022-12-28

## 2020-07-07 NOTE — PROGRESS NOTES
Subjective:      Chaya is a 50 y.o. female who comes for follow-up of sinusitis.  Her last visit with me was on 4/24/2018.  Now 4 years status-post endoscopic sinus surgery.   Fine until about 6 months ago, now ongoing, daily bilateral facial pressure in cheeks, upper teeth, and frontal region, with associated postnasal drip and congestion.  No aural fullness, nasal discharge, pruritic symptoms or epistaxis.  Saw Dr. Osuna and ESPERANZA Mariee, treated for reflux with omeprazole and dietary changes for the past 1 year, overall unconvincing results.    SNOT-22 score: : (P) 37  NOSE score:: (P) 55%  ETDQ-7 score:: (P) 5.3    The patient's medications, allergies, past medical, surgical, social and family histories were reviewed and updated as appropriate.    A detailed review of systems was obtained with pertinent positives as per the above HPI, and otherwise negative.         Objective:     /83   Pulse 81        Constitutional:   She appears well-developed. She is cooperative. Normal speech.  No hoarse voice.      Head:  Normocephalic. Salivary glands normal.  Facial strength is normal.      Ears:    Right Ear: No drainage or tenderness. Tympanic membrane is not perforated. Tympanic membrane mobility is normal. No middle ear effusion. No decreased hearing is noted.   Left Ear: No drainage or tenderness. Tympanic membrane is not perforated. Tympanic membrane mobility is normal.  No middle ear effusion. No decreased hearing is noted.     Nose:  No mucosal edema, rhinorrhea, septal deviation or polyps. No epistaxis. Turbinates normal, no turbinate masses and no turbinate hypertrophy.  Right sinus exhibits no maxillary sinus tenderness and no frontal sinus tenderness. Left sinus exhibits no maxillary sinus tenderness and no frontal sinus tenderness.     Mouth/Throat  Oropharynx clear and moist without lesions or asymmetry and normal uvula midline. She does not have dentures. Normal dentition. No oral lesions or mucous  membrane lesions. No oropharyngeal exudate or posterior oropharyngeal erythema. Mirror exam not performed due to patient tolerance.  Mirror exam not performed due to patient tolerance.      Neck:  Neck normal without thyromegaly masses, asymmetry, normal tracheal structure, crepitus, and tenderness, thyroid normal, trachea normal and no adenopathy. Normal range of motion present.     She has no cervical adenopathy.     Cardiovascular:   Regular rhythm.      Pulmonary/Chest:   Effort normal.     Psychiatric:   She has a normal mood and affect. Her speech is normal and behavior is normal.     Neurological:   No cranial nerve deficit.     Skin:   No rash noted.       Procedure    None        Data Reviewed    WBC (K/uL)   Date Value   04/03/2016 9.34     Eosinophil% (%)   Date Value   04/03/2016 0.7     Eos # (K/uL)   Date Value   04/03/2016 0.1     Platelets (K/uL)   Date Value   04/03/2016 226     Glucose (mg/dL)   Date Value   04/03/2016 84     IgE (IU/mL)   Date Value   03/16/2018 <35     Immunocaps all negative    Pathology report indicated non-eosinophilic chronic inflammation.           Assessment:     1. Chronic rhinitis    2. Other chronic sinusitis         Plan:     Get CT sinuses.  Follow up for test results.

## 2020-07-17 ENCOUNTER — HOSPITAL ENCOUNTER (OUTPATIENT)
Dept: RADIOLOGY | Facility: HOSPITAL | Age: 50
Discharge: HOME | End: 2020-07-17
Attending: OTOLARYNGOLOGY
Payer: COMMERCIAL

## 2020-07-17 DIAGNOSIS — J32.8 OTHER CHRONIC SINUSITIS: ICD-10-CM

## 2020-07-17 PROCEDURE — 70486 CT MAXILLOFACIAL W/O DYE: CPT | Mod: TC

## 2020-07-17 PROCEDURE — 70486 CT MAXILLOFACIAL W/O DYE: CPT | Mod: 26,,, | Performed by: RADIOLOGY

## 2020-07-17 PROCEDURE — 70486 CT MEDTRONIC SINUSES WITHOUT: ICD-10-PCS | Mod: 26,,, | Performed by: RADIOLOGY

## 2020-07-20 ENCOUNTER — PATIENT MESSAGE (OUTPATIENT)
Dept: OTOLARYNGOLOGY | Facility: CLINIC | Age: 50
End: 2020-07-20

## 2020-07-20 DIAGNOSIS — J32.8 OTHER CHRONIC SINUSITIS: Primary | ICD-10-CM

## 2020-07-20 RX ORDER — CLARITHROMYCIN 500 MG/1
TABLET, FILM COATED ORAL
Qty: 100 TABLET | Refills: 0 | Status: SHIPPED | OUTPATIENT
Start: 2020-07-20 | End: 2021-02-02

## 2020-07-20 RX ORDER — CLARITHROMYCIN 500 MG/1
TABLET, FILM COATED ORAL
Qty: 100 TABLET | Refills: 0 | Status: SHIPPED | OUTPATIENT
Start: 2020-07-20 | End: 2020-07-20 | Stop reason: SDUPTHER

## 2020-11-24 ENCOUNTER — OFFICE VISIT (OUTPATIENT)
Dept: OTOLARYNGOLOGY | Facility: CLINIC | Age: 50
End: 2020-11-24
Payer: COMMERCIAL

## 2020-11-24 VITALS — HEART RATE: 78 BPM | SYSTOLIC BLOOD PRESSURE: 135 MMHG | DIASTOLIC BLOOD PRESSURE: 76 MMHG

## 2020-11-24 DIAGNOSIS — M06.9 RHEUMATOID ARTHRITIS INVOLVING MULTIPLE SITES, UNSPECIFIED WHETHER RHEUMATOID FACTOR PRESENT: ICD-10-CM

## 2020-11-24 DIAGNOSIS — J32.8 OTHER CHRONIC SINUSITIS: ICD-10-CM

## 2020-11-24 DIAGNOSIS — J31.0 CHRONIC RHINITIS: Primary | ICD-10-CM

## 2020-11-24 DIAGNOSIS — H68.001 SALPINGITIS OF RIGHT EUSTACHIAN TUBE: ICD-10-CM

## 2020-11-24 PROCEDURE — 99999 PR PBB SHADOW E&M-EST. PATIENT-LVL III: ICD-10-PCS | Mod: PBBFAC,,, | Performed by: OTOLARYNGOLOGY

## 2020-11-24 PROCEDURE — 99214 PR OFFICE/OUTPT VISIT, EST, LEVL IV, 30-39 MIN: ICD-10-PCS | Mod: S$GLB,,, | Performed by: OTOLARYNGOLOGY

## 2020-11-24 PROCEDURE — 99214 OFFICE O/P EST MOD 30 MIN: CPT | Mod: S$GLB,,, | Performed by: OTOLARYNGOLOGY

## 2020-11-24 PROCEDURE — 99999 PR PBB SHADOW E&M-EST. PATIENT-LVL III: CPT | Mod: PBBFAC,,, | Performed by: OTOLARYNGOLOGY

## 2020-11-24 NOTE — PROGRESS NOTES
Subjective:      Chaya is a 50 y.o. female who comes for follow-up of sinusitis.  Her last visit with me was on 7/7/2020.  Now over 4 years status-post endoscopic sinus surgery.   Used clarithromycin for 3 months, no change at all, still daily postnasal drip, cough, right aural fullness with feeling of fluid that is intermittent.  Also variable bilateral cheek and teeth pressure, frontal headache, worse when lying down.  No nasal discharge.    SNOT-22 score: : (P) 36  NOSE score:: (P) 60%  ETDQ-7 score:: (P) 6    The patient's medications, allergies, past medical, surgical, social and family histories were reviewed and updated as appropriate.    A detailed review of systems was obtained with pertinent positives as per the above HPI, and otherwise negative.        Objective:     /76   Pulse 78        Constitutional:   She appears well-developed. She is cooperative. Normal speech.  No hoarse voice.      Head:  Normocephalic. Salivary glands normal.  Facial strength is normal.      Ears:    Right Ear: No drainage or tenderness. Tympanic membrane is not perforated. Tympanic membrane mobility is normal. No middle ear effusion. No decreased hearing is noted.   Left Ear: No drainage or tenderness. Tympanic membrane is not perforated. Tympanic membrane mobility is normal.  No middle ear effusion. No decreased hearing is noted.   No effusion at present    Nose:  No mucosal edema, rhinorrhea, septal deviation or polyps. No epistaxis. Turbinates normal, no turbinate masses and no turbinate hypertrophy.  Right sinus exhibits no maxillary sinus tenderness and no frontal sinus tenderness. Left sinus exhibits no maxillary sinus tenderness and no frontal sinus tenderness.   Diffuse scanty yellow mucus crusting as film bilaterally    Mouth/Throat  Oropharynx clear and moist without lesions or asymmetry and normal uvula midline. She does not have dentures. Normal dentition. No oral lesions or mucous membrane lesions. No  oropharyngeal exudate or posterior oropharyngeal erythema. Mirror exam not performed due to patient tolerance.  Mirror exam not performed due to patient tolerance.      Neck:  Neck normal without thyromegaly masses, asymmetry, normal tracheal structure, crepitus, and tenderness, thyroid normal, trachea normal and no adenopathy. Normal range of motion present.     She has no cervical adenopathy.     Cardiovascular:   Regular rhythm.      Pulmonary/Chest:   Effort normal.     Psychiatric:   She has a normal mood and affect. Her speech is normal and behavior is normal.     Neurological:   No cranial nerve deficit.     Skin:   No rash noted.       Procedure    None        Data Reviewed    WBC (K/uL)   Date Value   04/03/2016 9.34     Eosinophil % (%)   Date Value   04/03/2016 0.7     Eos # (K/uL)   Date Value   04/03/2016 0.1     Platelets (K/uL)   Date Value   04/03/2016 226     Glucose (mg/dL)   Date Value   04/03/2016 84     IgE (IU/mL)   Date Value   03/16/2018 <35       Pathology report indicated non-eosinophilic chronic inflammation.           Assessment:     1. Chronic rhinitis    2. Other chronic sinusitis    3. Salpingitis of right eustachian tube    4. Rheumatoid arthritis involving multiple sites, unspecified whether rheumatoid factor present         Plan:     Rx budesonide-clindamycin in nasoneb BID for 1 month, possibly continue for 2 more.  If no improvement will consider immunologic testing.  Follow up in about 1 month (around 12/24/2020), or if symptoms worsen or fail to improve.

## 2020-11-24 NOTE — PATIENT INSTRUCTIONS
Medication for the nasal rinse will be sent from the compounding pharmacy, [facility], in [place], [place].  Use it according to directions.

## 2020-12-29 ENCOUNTER — PATIENT MESSAGE (OUTPATIENT)
Dept: OTOLARYNGOLOGY | Facility: CLINIC | Age: 50
End: 2020-12-29

## 2021-02-02 ENCOUNTER — OFFICE VISIT (OUTPATIENT)
Dept: ALLERGY | Facility: CLINIC | Age: 51
End: 2021-02-02
Payer: COMMERCIAL

## 2021-02-02 VITALS — BODY MASS INDEX: 31.27 KG/M2 | HEIGHT: 64 IN | WEIGHT: 183.19 LBS

## 2021-02-02 DIAGNOSIS — J31.0 CHRONIC RHINITIS: ICD-10-CM

## 2021-02-02 DIAGNOSIS — G43.809 OTHER MIGRAINE WITHOUT STATUS MIGRAINOSUS, NOT INTRACTABLE: Primary | ICD-10-CM

## 2021-02-02 DIAGNOSIS — L30.9 DERMATITIS: ICD-10-CM

## 2021-02-02 DIAGNOSIS — M06.00 SERONEGATIVE RHEUMATOID ARTHRITIS: ICD-10-CM

## 2021-02-02 DIAGNOSIS — H10.403 CHRONIC CONJUNCTIVITIS OF BOTH EYES, UNSPECIFIED CHRONIC CONJUNCTIVITIS TYPE: ICD-10-CM

## 2021-02-02 PROCEDURE — 99999 PR PBB SHADOW E&M-EST. PATIENT-LVL III: ICD-10-PCS | Mod: PBBFAC,,, | Performed by: ALLERGY & IMMUNOLOGY

## 2021-02-02 PROCEDURE — 99215 OFFICE O/P EST HI 40 MIN: CPT | Mod: S$GLB,,, | Performed by: ALLERGY & IMMUNOLOGY

## 2021-02-02 PROCEDURE — 99999 PR PBB SHADOW E&M-EST. PATIENT-LVL III: CPT | Mod: PBBFAC,,, | Performed by: ALLERGY & IMMUNOLOGY

## 2021-02-02 PROCEDURE — 99215 PR OFFICE/OUTPT VISIT, EST, LEVL V, 40-54 MIN: ICD-10-PCS | Mod: S$GLB,,, | Performed by: ALLERGY & IMMUNOLOGY

## 2021-02-03 ENCOUNTER — TELEPHONE (OUTPATIENT)
Dept: NEUROLOGY | Facility: CLINIC | Age: 51
End: 2021-02-03

## 2021-02-05 ENCOUNTER — LAB VISIT (OUTPATIENT)
Dept: LAB | Facility: HOSPITAL | Age: 51
End: 2021-02-05
Attending: ALLERGY & IMMUNOLOGY
Payer: COMMERCIAL

## 2021-02-05 ENCOUNTER — OFFICE VISIT (OUTPATIENT)
Dept: ALLERGY | Facility: CLINIC | Age: 51
End: 2021-02-05
Payer: COMMERCIAL

## 2021-02-05 VITALS — WEIGHT: 183 LBS | HEIGHT: 64 IN | BODY MASS INDEX: 31.24 KG/M2 | TEMPERATURE: 98 F

## 2021-02-05 DIAGNOSIS — G44.89 OTHER HEADACHE SYNDROME: ICD-10-CM

## 2021-02-05 DIAGNOSIS — R20.0 NUMBNESS AND TINGLING: Primary | ICD-10-CM

## 2021-02-05 DIAGNOSIS — Z91.018 FOOD ALLERGY: ICD-10-CM

## 2021-02-05 DIAGNOSIS — R20.2 NUMBNESS AND TINGLING: Primary | ICD-10-CM

## 2021-02-05 DIAGNOSIS — Z01.84 ENCOUNTER FOR ANTIBODY RESPONSE EXAMINATION: ICD-10-CM

## 2021-02-05 DIAGNOSIS — R21 RASH: ICD-10-CM

## 2021-02-05 DIAGNOSIS — J31.0 CHRONIC RHINITIS: ICD-10-CM

## 2021-02-05 LAB — SARS-COV-2 IGG SERPLBLD QL IA.RAPID: NEGATIVE

## 2021-02-05 PROCEDURE — 95004 PERQ TESTS W/ALRGNC XTRCS: CPT | Mod: S$GLB,,, | Performed by: ALLERGY & IMMUNOLOGY

## 2021-02-05 PROCEDURE — 99214 PR OFFICE/OUTPT VISIT, EST, LEVL IV, 30-39 MIN: ICD-10-PCS | Mod: 25,S$GLB,, | Performed by: ALLERGY & IMMUNOLOGY

## 2021-02-05 PROCEDURE — 86769 SARS-COV-2 COVID-19 ANTIBODY: CPT

## 2021-02-05 PROCEDURE — 86003 ALLG SPEC IGE CRUDE XTRC EA: CPT | Mod: 59

## 2021-02-05 PROCEDURE — 36415 COLL VENOUS BLD VENIPUNCTURE: CPT

## 2021-02-05 PROCEDURE — 99999 PR PBB SHADOW E&M-EST. PATIENT-LVL III: CPT | Mod: PBBFAC,,, | Performed by: ALLERGY & IMMUNOLOGY

## 2021-02-05 PROCEDURE — 99214 OFFICE O/P EST MOD 30 MIN: CPT | Mod: 25,S$GLB,, | Performed by: ALLERGY & IMMUNOLOGY

## 2021-02-05 PROCEDURE — 99999 PR PBB SHADOW E&M-EST. PATIENT-LVL III: ICD-10-PCS | Mod: PBBFAC,,, | Performed by: ALLERGY & IMMUNOLOGY

## 2021-02-05 PROCEDURE — 95004 PR ALLERGY SKIN TESTS,ALLERGENS: ICD-10-PCS | Mod: S$GLB,,, | Performed by: ALLERGY & IMMUNOLOGY

## 2021-02-05 RX ORDER — FAMOTIDINE 20 MG/1
40 TABLET, FILM COATED ORAL
COMMUNITY
Start: 2020-04-22 | End: 2022-09-30

## 2021-02-05 RX ORDER — ACETAMINOPHEN 500 MG
TABLET ORAL
COMMUNITY

## 2021-02-08 LAB
BLACK PEPPER IGE QN: <0.1 KU/L
CHILI PEPPER IGE QN: <0.1 KU/L
DEPRECATED BLACK PEPPER IGE RAST QL: NORMAL
DEPRECATED CHILI PEPPER IGE RAST QL: NORMAL

## 2021-02-12 LAB
ALLERGEN NAME: NORMAL
ALLERGEN NAME: NORMAL
ALLERGEN RESULT: NORMAL
ALLERGEN RESULT: NORMAL

## 2021-03-20 ENCOUNTER — IMMUNIZATION (OUTPATIENT)
Dept: PRIMARY CARE CLINIC | Facility: CLINIC | Age: 51
End: 2021-03-20
Payer: COMMERCIAL

## 2021-03-20 DIAGNOSIS — Z23 NEED FOR VACCINATION: Primary | ICD-10-CM

## 2021-03-20 PROCEDURE — 0001A PR IMMUNIZ ADMIN, SARS-COV-2 COVID-19 VACC, 30MCG/0.3ML, 1ST DOSE: ICD-10-PCS | Mod: CV19,S$GLB,, | Performed by: INTERNAL MEDICINE

## 2021-03-20 PROCEDURE — 0001A PR IMMUNIZ ADMIN, SARS-COV-2 COVID-19 VACC, 30MCG/0.3ML, 1ST DOSE: CPT | Mod: CV19,S$GLB,, | Performed by: INTERNAL MEDICINE

## 2021-03-20 PROCEDURE — 91300 PR SARS-COV- 2 COVID-19 VACCINE, NO PRSV, 30MCG/0.3ML, IM: CPT | Mod: S$GLB,,, | Performed by: INTERNAL MEDICINE

## 2021-03-20 PROCEDURE — 91300 PR SARS-COV- 2 COVID-19 VACCINE, NO PRSV, 30MCG/0.3ML, IM: ICD-10-PCS | Mod: S$GLB,,, | Performed by: INTERNAL MEDICINE

## 2021-03-20 RX ADMIN — Medication 0.3 ML: at 12:03

## 2021-04-11 ENCOUNTER — IMMUNIZATION (OUTPATIENT)
Dept: PRIMARY CARE CLINIC | Facility: CLINIC | Age: 51
End: 2021-04-11
Payer: COMMERCIAL

## 2021-04-11 DIAGNOSIS — Z23 NEED FOR VACCINATION: Primary | ICD-10-CM

## 2021-04-11 PROCEDURE — 91300 PR SARS-COV- 2 COVID-19 VACCINE, NO PRSV, 30MCG/0.3ML, IM: ICD-10-PCS | Mod: S$GLB,,, | Performed by: INTERNAL MEDICINE

## 2021-04-11 PROCEDURE — 91300 PR SARS-COV- 2 COVID-19 VACCINE, NO PRSV, 30MCG/0.3ML, IM: CPT | Mod: S$GLB,,, | Performed by: INTERNAL MEDICINE

## 2021-04-11 PROCEDURE — 0002A PR IMMUNIZ ADMIN, SARS-COV-2 COVID-19 VACC, 30MCG/0.3ML, 2ND DOSE: ICD-10-PCS | Mod: CV19,S$GLB,, | Performed by: INTERNAL MEDICINE

## 2021-04-11 PROCEDURE — 0002A PR IMMUNIZ ADMIN, SARS-COV-2 COVID-19 VACC, 30MCG/0.3ML, 2ND DOSE: CPT | Mod: CV19,S$GLB,, | Performed by: INTERNAL MEDICINE

## 2021-04-11 RX ADMIN — Medication 0.3 ML: at 12:04

## 2021-04-19 ENCOUNTER — OFFICE VISIT (OUTPATIENT)
Dept: NEUROLOGY | Facility: CLINIC | Age: 51
End: 2021-04-19
Payer: COMMERCIAL

## 2021-04-19 VITALS
HEART RATE: 85 BPM | WEIGHT: 180.75 LBS | BODY MASS INDEX: 30.86 KG/M2 | SYSTOLIC BLOOD PRESSURE: 106 MMHG | HEIGHT: 64 IN | DIASTOLIC BLOOD PRESSURE: 78 MMHG

## 2021-04-19 DIAGNOSIS — G43.019 INTRACTABLE MIGRAINE WITHOUT AURA AND WITHOUT STATUS MIGRAINOSUS: Primary | ICD-10-CM

## 2021-04-19 DIAGNOSIS — M06.9 RHEUMATOID ARTHRITIS, INVOLVING UNSPECIFIED SITE, UNSPECIFIED WHETHER RHEUMATOID FACTOR PRESENT: ICD-10-CM

## 2021-04-19 DIAGNOSIS — R51.9 CHRONIC DAILY HEADACHE: ICD-10-CM

## 2021-04-19 PROCEDURE — 99204 PR OFFICE/OUTPT VISIT, NEW, LEVL IV, 45-59 MIN: ICD-10-PCS | Mod: S$GLB,,, | Performed by: NURSE PRACTITIONER

## 2021-04-19 PROCEDURE — 99204 OFFICE O/P NEW MOD 45 MIN: CPT | Mod: S$GLB,,, | Performed by: NURSE PRACTITIONER

## 2021-04-19 PROCEDURE — 99999 PR PBB SHADOW E&M-EST. PATIENT-LVL III: CPT | Mod: PBBFAC,,, | Performed by: NURSE PRACTITIONER

## 2021-04-19 PROCEDURE — 99999 PR PBB SHADOW E&M-EST. PATIENT-LVL III: ICD-10-PCS | Mod: PBBFAC,,, | Performed by: NURSE PRACTITIONER

## 2021-04-19 RX ORDER — RIZATRIPTAN BENZOATE 10 MG/1
10 TABLET, ORALLY DISINTEGRATING ORAL
Qty: 12 TABLET | Refills: 5 | Status: SHIPPED | OUTPATIENT
Start: 2021-04-19 | End: 2022-03-30 | Stop reason: SDUPTHER

## 2021-04-19 RX ORDER — GABAPENTIN 100 MG/1
200 CAPSULE ORAL NIGHTLY
COMMUNITY
End: 2023-01-12

## 2021-04-19 RX ORDER — TOPIRAMATE 50 MG/1
50 TABLET, FILM COATED ORAL NIGHTLY
Qty: 30 TABLET | Refills: 5 | Status: SHIPPED | OUTPATIENT
Start: 2021-04-19 | End: 2021-10-07

## 2021-04-19 RX ORDER — GABAPENTIN 100 MG/1
100 CAPSULE ORAL 3 TIMES DAILY
COMMUNITY
End: 2021-04-19

## 2021-08-06 ENCOUNTER — OFFICE VISIT (OUTPATIENT)
Dept: URGENT CARE | Facility: CLINIC | Age: 51
End: 2021-08-06
Payer: COMMERCIAL

## 2021-08-06 VITALS
RESPIRATION RATE: 16 BRPM | SYSTOLIC BLOOD PRESSURE: 117 MMHG | TEMPERATURE: 98 F | OXYGEN SATURATION: 96 % | WEIGHT: 180 LBS | HEIGHT: 64 IN | HEART RATE: 81 BPM | BODY MASS INDEX: 30.73 KG/M2 | DIASTOLIC BLOOD PRESSURE: 77 MMHG

## 2021-08-06 DIAGNOSIS — J06.9 VIRAL URI: Primary | ICD-10-CM

## 2021-08-06 PROBLEM — R22.32 MASS OF FINGER OF LEFT HAND: Status: ACTIVE | Noted: 2018-07-12

## 2021-08-06 PROBLEM — N80.9 ENDOMETRIOSIS DETERMINED BY LAPAROSCOPY: Status: ACTIVE | Noted: 2018-06-05

## 2021-08-06 PROBLEM — R10.2 ACUTE PELVIC PAIN, FEMALE: Status: ACTIVE | Noted: 2018-06-05

## 2021-08-06 PROBLEM — S05.00XA INJURY OF CONJUNCTIVA AND CORNEAL ABRASION WITHOUT FOREIGN BODY, UNSPECIFIED EYE, INITIAL ENCOUNTER: Status: ACTIVE | Noted: 2020-10-17

## 2021-08-06 PROBLEM — N60.11 DIFFUSE CYSTIC MASTOPATHY OF BOTH BREASTS: Status: ACTIVE | Noted: 2020-02-14

## 2021-08-06 PROBLEM — N60.12 DIFFUSE CYSTIC MASTOPATHY OF BOTH BREASTS: Status: ACTIVE | Noted: 2020-02-14

## 2021-08-06 LAB
CTP QC/QA: YES
SARS-COV-2 RDRP RESP QL NAA+PROBE: NEGATIVE

## 2021-08-06 PROCEDURE — U0002: ICD-10-PCS | Mod: QW,S$GLB,, | Performed by: PHYSICIAN ASSISTANT

## 2021-08-06 PROCEDURE — 99203 OFFICE O/P NEW LOW 30 MIN: CPT | Mod: S$GLB,,, | Performed by: PHYSICIAN ASSISTANT

## 2021-08-06 PROCEDURE — U0002 COVID-19 LAB TEST NON-CDC: HCPCS | Mod: QW,S$GLB,, | Performed by: PHYSICIAN ASSISTANT

## 2021-08-06 PROCEDURE — 99203 PR OFFICE/OUTPT VISIT, NEW, LEVL III, 30-44 MIN: ICD-10-PCS | Mod: S$GLB,,, | Performed by: PHYSICIAN ASSISTANT

## 2021-09-03 ENCOUNTER — PATIENT MESSAGE (OUTPATIENT)
Dept: NEUROLOGY | Facility: CLINIC | Age: 51
End: 2021-09-03

## 2021-10-07 DIAGNOSIS — G43.019 INTRACTABLE MIGRAINE WITHOUT AURA AND WITHOUT STATUS MIGRAINOSUS: ICD-10-CM

## 2021-10-07 RX ORDER — TOPIRAMATE 50 MG/1
50 TABLET, FILM COATED ORAL NIGHTLY
Qty: 90 TABLET | Refills: 0 | Status: SHIPPED | OUTPATIENT
Start: 2021-10-07 | End: 2022-01-03

## 2021-11-24 ENCOUNTER — IMMUNIZATION (OUTPATIENT)
Dept: PRIMARY CARE CLINIC | Facility: CLINIC | Age: 51
End: 2021-11-24
Payer: COMMERCIAL

## 2021-11-24 DIAGNOSIS — Z23 NEED FOR VACCINATION: Primary | ICD-10-CM

## 2021-11-24 PROCEDURE — 91300 COVID-19, MRNA, LNP-S, PF, 30 MCG/0.3 ML DOSE VACCINE: CPT | Mod: PBBFAC | Performed by: INTERNAL MEDICINE

## 2021-11-24 PROCEDURE — 0003A COVID-19, MRNA, LNP-S, PF, 30 MCG/0.3 ML DOSE VACCINE: CPT | Mod: CV19,PBBFAC | Performed by: INTERNAL MEDICINE

## 2021-12-27 ENCOUNTER — LAB VISIT (OUTPATIENT)
Dept: PRIMARY CARE CLINIC | Facility: OTHER | Age: 51
End: 2021-12-27
Attending: INTERNAL MEDICINE
Payer: COMMERCIAL

## 2021-12-27 DIAGNOSIS — Z20.822 ENCOUNTER FOR LABORATORY TESTING FOR COVID-19 VIRUS: ICD-10-CM

## 2021-12-27 PROCEDURE — U0003 INFECTIOUS AGENT DETECTION BY NUCLEIC ACID (DNA OR RNA); SEVERE ACUTE RESPIRATORY SYNDROME CORONAVIRUS 2 (SARS-COV-2) (CORONAVIRUS DISEASE [COVID-19]), AMPLIFIED PROBE TECHNIQUE, MAKING USE OF HIGH THROUGHPUT TECHNOLOGIES AS DESCRIBED BY CMS-2020-01-R: HCPCS | Performed by: INTERNAL MEDICINE

## 2021-12-29 LAB — SARS-COV-2 RNA RESP QL NAA+PROBE: DETECTED

## 2022-02-23 DIAGNOSIS — D84.9 IMMUNOSUPPRESSED STATUS: ICD-10-CM

## 2022-03-28 ENCOUNTER — PATIENT MESSAGE (OUTPATIENT)
Dept: NEUROLOGY | Facility: CLINIC | Age: 52
End: 2022-03-28
Payer: COMMERCIAL

## 2022-03-28 DIAGNOSIS — G43.019 INTRACTABLE MIGRAINE WITHOUT AURA AND WITHOUT STATUS MIGRAINOSUS: ICD-10-CM

## 2022-03-28 RX ORDER — TOPIRAMATE 50 MG/1
TABLET, FILM COATED ORAL
Qty: 90 TABLET | Refills: 0 | Status: SHIPPED | OUTPATIENT
Start: 2022-03-28 | End: 2022-06-23

## 2022-03-28 NOTE — TELEPHONE ENCOUNTER
3 month supply on topiramate approved.  No further refills without f/up appt. Pt notified via portal.

## 2022-03-30 ENCOUNTER — OFFICE VISIT (OUTPATIENT)
Dept: NEUROLOGY | Facility: CLINIC | Age: 52
End: 2022-03-30
Payer: COMMERCIAL

## 2022-03-30 VITALS
WEIGHT: 156.5 LBS | HEIGHT: 64 IN | HEART RATE: 88 BPM | DIASTOLIC BLOOD PRESSURE: 67 MMHG | SYSTOLIC BLOOD PRESSURE: 103 MMHG | BODY MASS INDEX: 26.72 KG/M2

## 2022-03-30 DIAGNOSIS — G43.019 INTRACTABLE MIGRAINE WITHOUT AURA AND WITHOUT STATUS MIGRAINOSUS: ICD-10-CM

## 2022-03-30 DIAGNOSIS — G43.009 MIGRAINE WITHOUT AURA AND WITHOUT STATUS MIGRAINOSUS, NOT INTRACTABLE: Primary | ICD-10-CM

## 2022-03-30 PROCEDURE — 99999 PR PBB SHADOW E&M-EST. PATIENT-LVL III: ICD-10-PCS | Mod: PBBFAC,,, | Performed by: NURSE PRACTITIONER

## 2022-03-30 PROCEDURE — 99999 PR PBB SHADOW E&M-EST. PATIENT-LVL III: CPT | Mod: PBBFAC,,, | Performed by: NURSE PRACTITIONER

## 2022-03-30 PROCEDURE — 99214 OFFICE O/P EST MOD 30 MIN: CPT | Mod: S$GLB,,, | Performed by: NURSE PRACTITIONER

## 2022-03-30 PROCEDURE — 99214 PR OFFICE/OUTPT VISIT, EST, LEVL IV, 30-39 MIN: ICD-10-PCS | Mod: S$GLB,,, | Performed by: NURSE PRACTITIONER

## 2022-03-30 RX ORDER — DICYCLOMINE HYDROCHLORIDE 10 MG/5ML
10 SOLUTION ORAL NIGHTLY
COMMUNITY
Start: 2022-02-08 | End: 2022-09-21

## 2022-03-30 RX ORDER — RIZATRIPTAN BENZOATE 10 MG/1
10 TABLET, ORALLY DISINTEGRATING ORAL
Qty: 12 TABLET | Refills: 5 | Status: SHIPPED | OUTPATIENT
Start: 2022-03-30 | End: 2024-03-06 | Stop reason: SDUPTHER

## 2022-03-30 NOTE — PROGRESS NOTES
"Established Patient   SUBJECTIVE:  Patient ID: Chaya Dee   Chief Complaint: Follow-up    History of Present Illness:  Chaya Dee is a 52 y.o. female who presents to clinic alone for follow-up of headaches.     Recommendations made at last Office Visit on 4/19/21:  - Discussed symptoms appear to be consistent with migraine without aura, discussed treatment options and patient agreed with the following plan:  - Start topiramate 50 mg qhs for headache/migraine prevention   - Continue gabapentin 200 mg qhs as prescribed by outside rheumatologist for RA   - For acute migraines - maxalt 10 mg mlt   - Risks, benefits, and potential side effects of topiramate, maxalt discussed  - Alternative treatment options offered   - RTC in 2 months or sooner if needed     03/30/2022 - Interval History:  Migraines have been much improved since starting topiramate.  Currently suffering with only one migraine per month each of which lasts about 1 day.  She is taking topiramate 50 mg nightly for migraine prevention.  Only rarely takes maxalt for acute migraines.  She is very pleased with the improvement in her migraines.  Would like to attempt to taper off topiramate.      Otherwise, information below is still accurate and current.     History of Present Illness:   51 y.o. female with chronic allergic rhinitis, headaches and rheumatoid arthritis, who presents to clinic alone for evaluation of headaches.  Long history of sinus issues, with chronic daily headaches/facial pain.  Reports once per week she experiences unbearable pain which persists all day in duration.  Headaches are described as a moderate to severe, pressure and pulsating pain "like I'm wearing pain goggles" located bilateral cheeks, forehead, and temporalis bilaterally.  Headaches can last anywhere from an hour up to all day in duration.   Pain can be rated anywhere from 3 to 8 out of 10, intensifying to peak over more than an hour.  Made worse by physical " "movement.  Associated symptoms include congestion, photophobia, nausea (only "once in a blue moon").  She does have a history of migraines in her past, which were hormonally driven, migraines resolved after she completed hormone therapy.  She has not been able to identify any trigger.  On days when headaches are worse, she is able to continue working however finds it more difficult to concentrate.       Treatments Tried and Response  Sumatriptan   Gabapentin 200 mg qhs - for RA  celebrex - for RA  topiramate - helping   maxalt 10 mg mlt - helps     Current Medications:    celecoxib (CELEBREX) 200 MG capsule, Take 200 mg by mouth as needed for Pain., Disp: , Rfl:     cholecalciferol, vitamin D3, 125 mcg (5,000 unit) Tab, Take by mouth., Disp: , Rfl:     cyanocobalamin 1,000 mcg/mL injection, every 14 (fourteen) days. , Disp: , Rfl: 0    dicyclomine (BENTYL) 10 mg/5 mL syrup, Take 10 mg by mouth every evening., Disp: , Rfl:     famotidine (PEPCID) 20 MG tablet, 40 mg. , Disp: , Rfl:     gabapentin (NEURONTIN) 100 MG capsule, Take 200 mg by mouth every evening., Disp: , Rfl:     hydroxychloroquine (PLAQUENIL) 200 mg tablet, Take 200 mg by mouth once daily., Disp: , Rfl:     pantoprazole sodium (PANTOPRAZOLE ORAL), Take 40 mg by mouth before evening meal., Disp: , Rfl:     topiramate (TOPAMAX) 50 MG tablet, TAKE 1 TABLET BY MOUTH EVERY DAY IN THE EVENING, Disp: 90 tablet, Rfl: 0    rizatriptan (MAXALT-MLT) 10 MG disintegrating tablet, Take 1 tablet (10 mg total) by mouth every 2 (two) hours as needed for Migraine. Max 30 mg/day., Disp: 12 tablet, Rfl: 5    Review of Systems - A review of 10+ systems was conducted with pertinent positive and negative findings documented in HPI with all other systems reviewed and negative.    PFSH: Past medical, family, and social history reviewed as documented in chart with pertinent positive medical, family, and social history detailed in HPI.    OBJECTIVE:  Vitals:  /67 " "(BP Location: Right arm, Patient Position: Sitting, BP Method: Large (Automatic))   Pulse 88   Ht 5' 4" (1.626 m)   Wt 71 kg (156 lb 8.4 oz)   BMI 26.87 kg/m²      Physical Exam:  Constitutional: she appears well-developed and well-nourished. she is well groomed. NAD    Review of Data:   Notes from urgent care reviewed   Labs:  Lab Visit on 12/27/2021   Component Date Value Ref Range Status    SARS-CoV2 (COVID-19) Qualitative P* 12/27/2021 Detected (A) Not Detected Final     Imaging:  No results found for this or any previous visit.  Note: I have independently reviewed any/all imaging/labs/tests and agree with the report (s) as documented.  Any discrepancies will be as noted/demarcated by free text.  MERARI SMITH-C 3/30/2022    ASSESSMENT:  1. Migraine without aura and without status migrainosus, not intractable    2. Intractable migraine without aura and without status migrainosus        PLAN:  - Will attempt to taper topiramate - recommend decreasing topiramate to 25 mg nightly for at least a month prior to discontinuing topiramate   - Will send periodic updates via patient portal   - For acute migraines - maxalt 10 mg mlt    - Continue tracking headaches   - RTC in 3 months or sooner if needed     Orders Placed This Encounter    rizatriptan (MAXALT-MLT) 10 MG disintegrating tablet     Questions and concerns were sought and answered to the patient's stated verbal satisfaction.  The patient verbalizes understanding and agreement with the above stated treatment plan.     CC: MD Tess Damian, FNP-C  [facility]   [phone]    Dr. Rashid was available during today's encounter.       "

## 2022-06-20 ENCOUNTER — OFFICE VISIT (OUTPATIENT)
Dept: NEUROLOGY | Facility: CLINIC | Age: 52
End: 2022-06-20
Payer: COMMERCIAL

## 2022-06-20 VITALS
DIASTOLIC BLOOD PRESSURE: 78 MMHG | HEIGHT: 64 IN | BODY MASS INDEX: 26.34 KG/M2 | WEIGHT: 154.31 LBS | HEART RATE: 83 BPM | SYSTOLIC BLOOD PRESSURE: 114 MMHG

## 2022-06-20 DIAGNOSIS — G43.009 MIGRAINE WITHOUT AURA AND WITHOUT STATUS MIGRAINOSUS, NOT INTRACTABLE: Primary | ICD-10-CM

## 2022-06-20 DIAGNOSIS — G43.019 INTRACTABLE MIGRAINE WITHOUT AURA AND WITHOUT STATUS MIGRAINOSUS: ICD-10-CM

## 2022-06-20 DIAGNOSIS — M06.9 RHEUMATOID ARTHRITIS, INVOLVING UNSPECIFIED SITE, UNSPECIFIED WHETHER RHEUMATOID FACTOR PRESENT: ICD-10-CM

## 2022-06-20 PROCEDURE — 99214 PR OFFICE/OUTPT VISIT, EST, LEVL IV, 30-39 MIN: ICD-10-PCS | Mod: S$GLB,,, | Performed by: NURSE PRACTITIONER

## 2022-06-20 PROCEDURE — 99214 OFFICE O/P EST MOD 30 MIN: CPT | Mod: S$GLB,,, | Performed by: NURSE PRACTITIONER

## 2022-06-20 PROCEDURE — 99999 PR PBB SHADOW E&M-EST. PATIENT-LVL III: ICD-10-PCS | Mod: PBBFAC,,, | Performed by: NURSE PRACTITIONER

## 2022-06-20 PROCEDURE — 99999 PR PBB SHADOW E&M-EST. PATIENT-LVL III: CPT | Mod: PBBFAC,,, | Performed by: NURSE PRACTITIONER

## 2022-06-20 NOTE — PROGRESS NOTES
Established Patient   SUBJECTIVE:  Patient ID: Chaya Dee   Chief Complaint: Follow-up    History of Present Illness:  Chaya Dee is a 52 y.o. female who presents to clinic alone for follow-up of headaches.     Recommendations made at last Office Visit on 3/30/22:  - Will attempt to taper topiramate - recommend decreasing topiramate to 25 mg nightly for at least a month prior to discontinuing topiramate   - Will send periodic updates via patient portal   - For acute migraines - maxalt 10 mg mlt    - Continue tracking headaches   - RTC in 3 months or sooner if needed     06/20/2022 - Interval History:  Tapered topiramate to 25 mg nightly which she had been taking nightly from end of March until a few weeks ago.  Noticed she was starting to experience more frequent migraines and subsequently decided to increase topiramate back to 50 mg nightly.  Since then, migraines have been back under control.  She continues to treat acute migraines with rizatriptan.  She would like to eventually get off topiramate.      Otherwise, information below is still accurate and current.     03/30/2022 - Interval History:  Migraines have been much improved since starting topiramate.  Currently suffering with only one migraine per month each of which lasts about 1 day.  She is taking topiramate 50 mg nightly for migraine prevention.  Only rarely takes maxalt for acute migraines.  She is very pleased with the improvement in her migraines.  Would like to attempt to taper off topiramate.       Otherwise, information below is still accurate and current.      History of Present Illness:   51 y.o. female with chronic allergic rhinitis, headaches and rheumatoid arthritis, who presents to clinic alone for evaluation of headaches.  Long history of sinus issues, with chronic daily headaches/facial pain.  Reports once per week she experiences unbearable pain which persists all day in duration.  Headaches are described as a moderate  "to severe, pressure and pulsating pain "like I'm wearing pain goggles" located bilateral cheeks, forehead, and temporalis bilaterally.  Headaches can last anywhere from an hour up to all day in duration.   Pain can be rated anywhere from 3 to 8 out of 10, intensifying to peak over more than an hour.  Made worse by physical movement.  Associated symptoms include congestion, photophobia, nausea (only "once in a blue moon").  She does have a history of migraines in her past, which were hormonally driven, migraines resolved after she completed hormone therapy.  She has not been able to identify any trigger.  On days when headaches are worse, she is able to continue working however finds it more difficult to concentrate.       Treatments Tried and Response  Sumatriptan   Gabapentin 200 mg qhs - for RA  celebrex - for RA  topiramate - helping   maxalt 10 mg mlt - helps     Current Medications:    celecoxib (CELEBREX) 200 MG capsule, Take 200 mg by mouth as needed for Pain., Disp: , Rfl:     cholecalciferol, vitamin D3, 125 mcg (5,000 unit) Tab, Take by mouth., Disp: , Rfl:     cyanocobalamin 1,000 mcg/mL injection, every 14 (fourteen) days. , Disp: , Rfl: 0    dicyclomine (BENTYL) 10 mg/5 mL syrup, Take 10 mg by mouth every evening., Disp: , Rfl:     famotidine (PEPCID) 20 MG tablet, 40 mg. , Disp: , Rfl:     gabapentin (NEURONTIN) 100 MG capsule, Take 200 mg by mouth every evening., Disp: , Rfl:     hydroxychloroquine (PLAQUENIL) 200 mg tablet, Take 200 mg by mouth once daily., Disp: , Rfl:     pantoprazole sodium (PANTOPRAZOLE ORAL), Take 40 mg by mouth before evening meal., Disp: , Rfl:     rizatriptan (MAXALT-MLT) 10 MG disintegrating tablet, Take 1 tablet (10 mg total) by mouth every 2 (two) hours as needed for Migraine. Max 30 mg/day., Disp: 12 tablet, Rfl: 5    topiramate (TOPAMAX) 50 MG tablet, TAKE 1 TABLET BY MOUTH EVERY DAY IN THE EVENING, Disp: 90 tablet, Rfl: 0    Review of Systems - A review of 10+ " "systems was conducted with pertinent positive and negative findings documented in HPI with all other systems reviewed and negative.    PFSH: Past medical, family, and social history reviewed as documented in chart with pertinent positive medical, family, and social history detailed in HPI.    OBJECTIVE:  Vitals:  /78   Pulse 83   Ht 5' 4" (1.626 m)   Wt 70 kg (154 lb 5.2 oz)   BMI 26.49 kg/m²      Physical Exam:  Constitutional: she appears well-developed and well-nourished. she is well groomed. NAD    Review of Data:   Notes from urgent care reviewed   Labs:  Lab Visit on 12/27/2021   Component Date Value Ref Range Status    SARS-CoV2 (COVID-19) Qualitative P* 12/27/2021 Detected (A) Not Detected Final      Latest Reference Range & Units 04/03/16 12:24   WBC 3.90 - 12.70 K/uL 9.34   RBC 4.00 - 5.40 M/uL 4.83   Hemoglobin 12.0 - 16.0 g/dL 13.7   Hematocrit 37.0 - 48.5 % 44.2   MCV 82 - 98 fL 92   MCH 27.0 - 31.0 pg 28.4   MCHC 32.0 - 36.0 % 31.0 (L)   RDW 11.5 - 14.5 % 14.5   Platelets 150 - 350 K/uL 226   MPV 9.2 - 12.9 fL 10.5   Gran % 38.0 - 73.0 % 74.2 (H)   Lymph % 18.0 - 48.0 % 19.3   Mono % 4.0 - 15.0 % 5.4   Eosinophil % 0.0 - 8.0 % 0.7   Basophil % 0.0 - 1.9 % 0.3   Gran # (ANC) 1.8 - 7.7 K/uL 6.9   Lymph # 1.0 - 4.8 K/uL 1.8   Mono # 0.3 - 1.0 K/uL 0.5   Eos # 0.0 - 0.5 K/uL 0.1   Baso # 0.00 - 0.20 K/uL 0.03   Differential Method  Automated   Iron 30 - 160 ug/dL 50   TIBC 250 - 450 ug/dL 524 (H)   Saturated Iron 20 - 50 % 10 (L)   Transferrin 200 - 375 mg/dL 354   Ferritin 20.0 - 300.0 ng/mL 24   Vitamin B-12 210 - 950 pg/mL 240   (L): Data is abnormally low  (H): Data is abnormally high  Imaging:  No results found for this or any previous visit.  Note: I have independently reviewed any/all imaging/labs/tests and agree with the report (s) as documented.  Any discrepancies will be as noted/demarcated by free text.  MERARI SMITH-C 6/23/2022    ASSESSMENT:  1. Migraine without aura and without status " migrainosus, not intractable    2. Rheumatoid arthritis, involving unspecified site, unspecified whether rheumatoid factor present      PLAN:  - For migraine prevention - continue topiramate 50 mg nighlty for 3 more months, after which can again attempt to slowly taper topiramate   - For acute migraines - maxalt 10 mg mlt   - Refills provided   - RA - stable on current medications, management per outside rheumatologist   - RTC in 3-6 months or sooner if needed     Orders Placed This Encounter    topiramate (TOPAMAX) 50 MG tablet     Questions and concerns were sought and answered to the patient's stated verbal satisfaction.  The patient verbalizes understanding and agreement with the above stated treatment plan.     CC: MD Tess Damian, FNP-C  [facility]   [phone]    Dr. Rashid was available during today's encounter.

## 2022-06-23 RX ORDER — TOPIRAMATE 50 MG/1
50 TABLET, FILM COATED ORAL NIGHTLY
Qty: 90 TABLET | Refills: 0 | Status: SHIPPED | OUTPATIENT
Start: 2022-06-23 | End: 2022-09-21

## 2022-09-21 ENCOUNTER — OFFICE VISIT (OUTPATIENT)
Dept: NEUROLOGY | Facility: CLINIC | Age: 52
End: 2022-09-21
Payer: COMMERCIAL

## 2022-09-21 ENCOUNTER — LAB VISIT (OUTPATIENT)
Dept: LAB | Facility: HOSPITAL | Age: 52
End: 2022-09-21
Payer: COMMERCIAL

## 2022-09-21 VITALS
BODY MASS INDEX: 26.17 KG/M2 | SYSTOLIC BLOOD PRESSURE: 121 MMHG | HEIGHT: 64 IN | WEIGHT: 153.31 LBS | DIASTOLIC BLOOD PRESSURE: 75 MMHG | HEART RATE: 88 BPM

## 2022-09-21 DIAGNOSIS — M06.9 RHEUMATOID ARTHRITIS, INVOLVING UNSPECIFIED SITE, UNSPECIFIED WHETHER RHEUMATOID FACTOR PRESENT: ICD-10-CM

## 2022-09-21 DIAGNOSIS — G47.9 TROUBLE IN SLEEPING: ICD-10-CM

## 2022-09-21 DIAGNOSIS — G43.009 MIGRAINE WITHOUT AURA AND WITHOUT STATUS MIGRAINOSUS, NOT INTRACTABLE: ICD-10-CM

## 2022-09-21 DIAGNOSIS — M54.12 CERVICAL RADICULOPATHY: ICD-10-CM

## 2022-09-21 DIAGNOSIS — G89.29 CHRONIC LOW BACK PAIN WITHOUT SCIATICA, UNSPECIFIED BACK PAIN LATERALITY: ICD-10-CM

## 2022-09-21 DIAGNOSIS — M54.50 CHRONIC LOW BACK PAIN WITHOUT SCIATICA, UNSPECIFIED BACK PAIN LATERALITY: ICD-10-CM

## 2022-09-21 DIAGNOSIS — G43.009 MIGRAINE WITHOUT AURA AND WITHOUT STATUS MIGRAINOSUS, NOT INTRACTABLE: Primary | ICD-10-CM

## 2022-09-21 DIAGNOSIS — M54.2 CERVICALGIA: ICD-10-CM

## 2022-09-21 DIAGNOSIS — M79.18 CERVICAL MYOFASCIAL PAIN SYNDROME: ICD-10-CM

## 2022-09-21 LAB
ALBUMIN SERPL BCP-MCNC: 4.4 G/DL (ref 3.5–5.2)
ALP SERPL-CCNC: 93 U/L (ref 55–135)
ALT SERPL W/O P-5'-P-CCNC: 75 U/L (ref 10–44)
ANION GAP SERPL CALC-SCNC: 7 MMOL/L (ref 8–16)
AST SERPL-CCNC: 54 U/L (ref 10–40)
BILIRUB SERPL-MCNC: 1.3 MG/DL (ref 0.1–1)
BUN SERPL-MCNC: 17 MG/DL (ref 6–20)
CALCIUM SERPL-MCNC: 9.7 MG/DL (ref 8.7–10.5)
CHLORIDE SERPL-SCNC: 106 MMOL/L (ref 95–110)
CO2 SERPL-SCNC: 26 MMOL/L (ref 23–29)
CREAT SERPL-MCNC: 0.9 MG/DL (ref 0.5–1.4)
EST. GFR  (NO RACE VARIABLE): >60 ML/MIN/1.73 M^2
GLUCOSE SERPL-MCNC: 93 MG/DL (ref 70–110)
POTASSIUM SERPL-SCNC: 4.3 MMOL/L (ref 3.5–5.1)
PROT SERPL-MCNC: 6.9 G/DL (ref 6–8.4)
SODIUM SERPL-SCNC: 139 MMOL/L (ref 136–145)
TSH SERPL DL<=0.005 MIU/L-ACNC: 0.69 UIU/ML (ref 0.4–4)

## 2022-09-21 PROCEDURE — 99214 OFFICE O/P EST MOD 30 MIN: CPT | Mod: S$GLB,,, | Performed by: NURSE PRACTITIONER

## 2022-09-21 PROCEDURE — 99999 PR PBB SHADOW E&M-EST. PATIENT-LVL IV: ICD-10-PCS | Mod: PBBFAC,,, | Performed by: NURSE PRACTITIONER

## 2022-09-21 PROCEDURE — 80053 COMPREHEN METABOLIC PANEL: CPT | Performed by: NURSE PRACTITIONER

## 2022-09-21 PROCEDURE — 99214 PR OFFICE/OUTPT VISIT, EST, LEVL IV, 30-39 MIN: ICD-10-PCS | Mod: S$GLB,,, | Performed by: NURSE PRACTITIONER

## 2022-09-21 PROCEDURE — 36415 COLL VENOUS BLD VENIPUNCTURE: CPT | Performed by: NURSE PRACTITIONER

## 2022-09-21 PROCEDURE — 84443 ASSAY THYROID STIM HORMONE: CPT | Performed by: NURSE PRACTITIONER

## 2022-09-21 PROCEDURE — 99999 PR PBB SHADOW E&M-EST. PATIENT-LVL IV: CPT | Mod: PBBFAC,,, | Performed by: NURSE PRACTITIONER

## 2022-09-21 RX ORDER — TIZANIDINE 2 MG/1
TABLET ORAL
Qty: 60 TABLET | Refills: 5 | Status: SHIPPED | OUTPATIENT
Start: 2022-09-21 | End: 2023-01-12

## 2022-09-21 RX ORDER — TOPIRAMATE 50 MG/1
50 TABLET, FILM COATED ORAL 2 TIMES DAILY
Qty: 60 TABLET | Refills: 5 | Status: SHIPPED | OUTPATIENT
Start: 2022-09-21 | End: 2022-12-28 | Stop reason: SDUPTHER

## 2022-09-21 NOTE — PROGRESS NOTES
Established Patient   SUBJECTIVE:  Patient ID: Chaya Dee   Chief Complaint: Follow-up    History of Present Illness:  Chaya Dee is a 52 y.o. female who presents to clinic alone for follow-up of headaches.     Recommendations made at last Office Visit on 6/20/22:  - For migraine prevention - continue topiramate 50 mg nighlty for 3 more months, after which can again attempt to slowly taper topiramate   - For acute migraines - maxalt 10 mg mlt   - Refills provided   - RA - stable on current medications, management per outside rheumatologist   - RTC in 3-6 months or sooner if needed     09/21/2022 - Interval History:  She again attempted to taper topiramate beginning the day after her last visit, noticed she began to experience more frequent headaches and migraines became more intense so went back to taking 50 mg nightly (has been back on 50 mg nightly dose for over 2 months now).    Currently suffering with a headache on average 3 days per week, the majority of which come on in the afternoon, before dinnertime, maybe around 2 pm.  If she treats with maxalt 10 mg mlt migraine will last a couple of hours, otherwise will last the remainder of the day, occasionally will carry into the following day.    Headaches began to occur more frequently around July.    Stops drinking caffeine around 10:30 am, drinks at least 2 cups of coffee before this.      Sleep is poor - has trouble falling asleep and staying asleep. On average sleeps 4 hours per night. Does not feel rested upon waking.  Once she gets into the bed feels her mind will start to race, has a hard time turning her mind off.     Otherwise, information below is still accurate and current.     06/20/2022 - Interval History:  Tapered topiramate to 25 mg nightly which she had been taking nightly from end of March until a few weeks ago.  Noticed she was starting to experience more frequent migraines and subsequently decided to increase topiramate back to  "50 mg nightly.  Since then, migraines have been back under control.  She continues to treat acute migraines with rizatriptan.  She would like to eventually get off topiramate.       Otherwise, information below is still accurate and current.      03/30/2022 - Interval History:  Migraines have been much improved since starting topiramate.  Currently suffering with only one migraine per month each of which lasts about 1 day.  She is taking topiramate 50 mg nightly for migraine prevention.  Only rarely takes maxalt for acute migraines.  She is very pleased with the improvement in her migraines.  Would like to attempt to taper off topiramate.       Otherwise, information below is still accurate and current.      History of Present Illness:   51 y.o. female with chronic allergic rhinitis, headaches and rheumatoid arthritis, who presents to clinic alone for evaluation of headaches.  Long history of sinus issues, with chronic daily headaches/facial pain.  Reports once per week she experiences unbearable pain which persists all day in duration.  Headaches are described as a moderate to severe, pressure and pulsating pain "like I'm wearing pain goggles" located bilateral cheeks, forehead, and temporalis bilaterally.  Headaches can last anywhere from an hour up to all day in duration.   Pain can be rated anywhere from 3 to 8 out of 10, intensifying to peak over more than an hour.  Made worse by physical movement.  Associated symptoms include congestion, photophobia, nausea (only "once in a blue moon").  She does have a history of migraines in her past, which were hormonally driven, migraines resolved after she completed hormone therapy.  She has not been able to identify any trigger.  On days when headaches are worse, she is able to continue working however finds it more difficult to concentrate.       Treatments Tried and Response  Sumatriptan   Gabapentin 200 mg qhs - for RA  celebrex - for RA  topiramate - helping   maxalt " "10 mg mlt - helps     Current Medications:    celecoxib (CELEBREX) 200 MG capsule, Take 200 mg by mouth as needed for Pain., Disp: , Rfl:     cholecalciferol, vitamin D3, 125 mcg (5,000 unit) Tab, Take by mouth., Disp: , Rfl:     cyanocobalamin 1,000 mcg/mL injection, every 14 (fourteen) days. , Disp: , Rfl: 0    famotidine (PEPCID) 20 MG tablet, 40 mg. , Disp: , Rfl:     gabapentin (NEURONTIN) 100 MG capsule, Take 200 mg by mouth every evening., Disp: , Rfl:     hydroxychloroquine (PLAQUENIL) 200 mg tablet, Take 200 mg by mouth once daily., Disp: , Rfl:     pantoprazole sodium (PANTOPRAZOLE ORAL), Take 40 mg by mouth before evening meal., Disp: , Rfl:     rizatriptan (MAXALT-MLT) 10 MG disintegrating tablet, Take 1 tablet (10 mg total) by mouth every 2 (two) hours as needed for Migraine. Max 30 mg/day., Disp: 12 tablet, Rfl: 5    tiZANidine (ZANAFLEX) 2 MG tablet, Take 1-2 tabs around bedtime. No alcohol, no driving with medication., Disp: 60 tablet, Rfl: 5    topiramate (TOPAMAX) 50 MG tablet, Take 1 tablet (50 mg total) by mouth 2 (two) times daily., Disp: 60 tablet, Rfl: 5    Review of Systems - A review of 10+ systems was conducted with pertinent positive and negative findings documented in HPI with all other systems reviewed and negative.    PFSH: Past medical, family, and social history reviewed as documented in chart with pertinent positive medical, family, and social history detailed in HPI.    OBJECTIVE:  Vitals:  /75   Pulse 88   Ht 5' 4" (1.626 m)   Wt 69.6 kg (153 lb 5.3 oz)   BMI 26.32 kg/m²      Physical Exam:  Constitutional: she appears well-developed and well-nourished. she is well groomed. NAD.   HENT:    Head: Normocephalic and atraumatic, oral and nasal mucosa intact.    Neck: Neck supple. Limited ROM of neck.  Pain with left lateral bend.  DROM of motion.      Reflexes:  Right Brachioradialis 2+  Left Brachioradialis 2+  Right Biceps 2+  Left Biceps 2+    Review of Data:   Notes from " urgent care reviewed   Labs:  No visits with results within 6 Month(s) from this visit.   Latest known visit with results is:   Lab Visit on 12/27/2021   Component Date Value Ref Range Status    SARS-CoV2 (COVID-19) Qualitative P* 12/27/2021 Detected (A)  Not Detected Final     Imaging:  No results found for this or any previous visit.  Note: I have independently reviewed any/all imaging/labs/tests and agree with the report (s) as documented.  Any discrepancies will be as noted/demarcated by free text.  MARTINEZ SMITH 9/21/2022    ASSESSMENT:  1. Migraine without aura and without status migrainosus, not intractable    2. Cervical radiculopathy    3. Chronic low back pain without sciatica, unspecified back pain laterality    4. Cervicalgia    5. Cervical myofascial pain syndrome    6. Rheumatoid arthritis, involving unspecified site, unspecified whether rheumatoid factor present    7. Trouble in sleeping      PLAN:  - Discussed symptoms appear to be consistent with migraine without aura complicated by neck pain, back pain, insomnia, increased stress, discussed treatment options and patient agreed with the following plan:  - For migraine prevention - will increase topiramate to 50 mg bid   - Start tizanidine 2-4 ritika nightly - likely to benefit sleep, neck pain and headache prevention   - Continue gabapentin 200 mg nightly   - Could consider increasing gabapentin in the future   - Referral to physical therapy for neck pain    - Continue tracking headaches   - CMP, TSH ordered   - referral placed to healthy back program   - RTC in 2 months or sooner if needed     Orders Placed This Encounter    TSH    Comprehensive metabolic panel    Ambulatory referral/consult to Physical/Occupational Therapy    Ambulatory referral/consult to [facility]    topiramate (TOPAMAX) 50 MG tablet    tiZANidine (ZANAFLEX) 2 MG tablet     Questions and concerns were sought and answered to the patient's stated verbal satisfaction.  The  patient verbalizes understanding and agreement with the above stated treatment plan.     CC: MD Tess Damian, FNP-C  [facility]   [phone]    Dr. Rashid was available during today's encounter.

## 2022-09-22 ENCOUNTER — PATIENT MESSAGE (OUTPATIENT)
Dept: NEUROLOGY | Facility: CLINIC | Age: 52
End: 2022-09-22
Payer: COMMERCIAL

## 2022-09-23 ENCOUNTER — CLINICAL SUPPORT (OUTPATIENT)
Dept: REHABILITATION | Facility: OTHER | Age: 52
End: 2022-09-23
Payer: COMMERCIAL

## 2022-09-23 DIAGNOSIS — M54.2 CERVICALGIA: ICD-10-CM

## 2022-09-23 DIAGNOSIS — G89.29 CHRONIC NECK AND BACK PAIN: ICD-10-CM

## 2022-09-23 DIAGNOSIS — M54.9 CHRONIC NECK AND BACK PAIN: ICD-10-CM

## 2022-09-23 DIAGNOSIS — M54.2 CHRONIC NECK AND BACK PAIN: ICD-10-CM

## 2022-09-23 DIAGNOSIS — G44.86 CERVICOGENIC HEADACHE: ICD-10-CM

## 2022-09-23 DIAGNOSIS — G43.009 MIGRAINE WITHOUT AURA AND WITHOUT STATUS MIGRAINOSUS, NOT INTRACTABLE: ICD-10-CM

## 2022-09-23 PROCEDURE — 97162 PT EVAL MOD COMPLEX 30 MIN: CPT | Mod: PN

## 2022-09-23 PROCEDURE — 97110 THERAPEUTIC EXERCISES: CPT | Mod: PN

## 2022-09-26 ENCOUNTER — CLINICAL SUPPORT (OUTPATIENT)
Dept: REHABILITATION | Facility: OTHER | Age: 52
End: 2022-09-26
Payer: COMMERCIAL

## 2022-09-26 DIAGNOSIS — M54.9 CHRONIC NECK AND BACK PAIN: Primary | ICD-10-CM

## 2022-09-26 DIAGNOSIS — M54.2 CHRONIC NECK AND BACK PAIN: Primary | ICD-10-CM

## 2022-09-26 DIAGNOSIS — G44.86 CERVICOGENIC HEADACHE: ICD-10-CM

## 2022-09-26 DIAGNOSIS — G89.29 CHRONIC NECK AND BACK PAIN: Primary | ICD-10-CM

## 2022-09-26 PROCEDURE — 97140 MANUAL THERAPY 1/> REGIONS: CPT | Mod: PN | Performed by: PHYSICAL THERAPIST

## 2022-09-26 PROCEDURE — 97110 THERAPEUTIC EXERCISES: CPT | Mod: PN | Performed by: PHYSICAL THERAPIST

## 2022-09-26 NOTE — PLAN OF CARE
"[facility]   Physical Therapy Initial Evaluation     Date: 9/23/2022   Name: Chaya Dee  Clinic Number: [id number]    Therapy Diagnosis:   Encounter Diagnoses   Name Primary?    Migraine without aura and without status migrainosus, not intractable     Cervicalgia     Chronic neck and back pain     Cervicogenic headache      Physician: Tess Haas FNP    Physician Orders: PT Eval and Treat   Medical Diagnosis from Referral:   M54.2 (ICD-10-CM) - Cervicalgia   G43.009 (ICD-10-CM) - Migraine without aura and without status migrainosus, not intractable     Evaluation Date: 9/23/2022  Authorization Period Expiration: 9/21/2023  Plan of Care Expiration: 12/23/2022  Progress Note Due: 10/23/2022  Visit # / Visits authorized: 1/ 1   FOTO: 1/3 on 9/23    Precautions: Standard     Time In: 2:15  Time Out: 3:20  Total Appointment Time (timed & untimed codes): 65 minutes      SUBJECTIVE     Date of onset: "years ago"    History of current condition - Chaya reports: chronic neck and back pain with a more recent Hx of headaches.    1) neck pain (L) - started in 2018 without GABRIELLE or trauma. Notes progressive worsening since onset and has gone from intermittent to constant with marked limitations in neck ROM in all directions. Imaging from [facility] notes stenosis at C5-6. Was having numbness/tingling down both arms but that has since resolved. Pain and stiffness limits her ability to turn her head to the L to look over her shoulder while driving, look down with reading, prolonged sitting with work, using L UE with HHCs including heavy activities. Tried PT at [facility] a few years ago, but says it did not help and that she did not like the traction ("it made me feel worse").    2) headaches - insidious onset around the time her neck pain started. Starts in the back of the head/behind the left ear and will radiate around her head and face to her eyes and forehead. Describes it as a tightness as " if wearing too small goggles. Headaches are daily and worse as the day goes on (afternoons/evenings) and with increased stress. Has a Hx of mirgaines with auro but says that these headaches feel different. Denies light or sound sensitivity.    3) R low back and hip - chronic Hx without GABRIELLE or trauma. Reports constant pain with worsening over time; indicates pain over lower R SIJ that shoots down along her sacrum and occasionally includes her tailbone. Notes new onset of R piriformis pain that started recently after climbing multiple flights of stairs for a fire drill at work. Worse with prolonged standing and walking, is not relieved with stretching, yoga, pillow support in bed.     Pt denies drop foot, change of B/B control, saddle paresthesias, unexplained weight gain/loss, fever, chills or night sweats.       Falls: none    Imaging, none in [facility]    Prior Therapy: yes, in 2018 outside of [facility] ([facility]) - did not get any relief.  Chiro (Alicia Bolden on [facility], 2x/weekly) for adjustments of neck and back - has alleviated pain down the mid back and around the scapula  Social History: 2 story home, lives with their spouse  Occupation:  at accounting firm, has a standing desk  Recreation: yoga (started 2 weeks ago)  Prior Level of Function: independent but with occasional pain  Current Level of Function: pain is now constant and with all activities    Pain:  Current 6/10, worst 9/10, best 6/10   Location: left head and neck, right low back/pelvis   Description: Aching, Dull, and constant. Occasionally sharp from R low back down sacrum  Aggravating Factors:  Neck -  PM, stress  Back - Sitting, Standing, Bending, Walking, Night Time, Lifting, Getting out of bed/chair, and stair climbing  Easing Factors: injections at [facility] (feels it did not help), change position, rest, hot shower, ice pack, medication as needed    Patients goals: Wants to go to [place] in July 2023 with SAM and 2 friends     Medical  "History:   Past Medical History:   Diagnosis Date    Allergy     Chronic allergic rhinitis 3/19/2016    Endometriosis     Laryngopharyngeal reflux disease 03/2018    PONV (postoperative nausea and vomiting)     Recurrent upper respiratory infection (URI)        Surgical History:   Chaya Dee  has a past surgical history that includes Adenoidectomy; Tonsillectomy; Nasal septum surgery; Shoulder surgery; Abdominal surgery; and Sinus surgery.    Medications:   Chaya has a current medication list which includes the following prescription(s): celecoxib, cholecalciferol (vitamin d3), cyanocobalamin, famotidine, gabapentin, hydroxychloroquine, pantoprazole sodium, rizatriptan, tizanidine, and topiramate.    Allergies:   Review of patient's allergies indicates:  No Known Allergies       OBJECTIVE     Posture Alignment: FHP, rounded shoulders, elevated L scapula  DTR's: intact  Dermatomes: Sensation: Light Touch: Intact    Upper Quarter Screen    Resting posture: elevated and increased winging of L scapula  Palpation: increased tone/tenderness to suboccipitals, L UT/LS, scalenes  Joint mobs: mod dickens SCS, first rib (L), T1-4 PAs and RR/LR glides    CERVICAL SPINE AROM:   Flexion: Mod dickens, chin 1" to chest, tension in posterior neck and head   Extension: Min dickens, stiffness and hesitation prior to endrange due to fear of pain   Left Sidebend: Mod dickens, pinch in L neck   Right Sidebend: Mod dickens, tension over L neck   Left Rotation: <45 deg, sharp L neck pain   Right Rotation: 50 deg, tension noted over L neck     Deep Neck Flexor: fair-  Shoulder ROM: WNL all directions, winging noted with IR  Elbow ROM: WNL      UPPER EXTREMITY STRENGTH:   Left Right   Shoulder Flexion 5/5 5/5   Shoulder Abduction 5/5 5/5   Shoulder Internal Rotation 5/5 5/5   Shoulder External Rotation 4+/5 - winging noted 4+/5 - winging noted   Upper trap 5/5 5/5   Mid trap Fair- Fair-   Lower Trap poor poor   Serratus anterior Fair- Fair- " "    Flexibility:   Pectoralis Major / Minor: hypo  Latissimus Dorsi: good  Subscapularis: good  Upper Traps: hypo  Levator Scap: hypo    Special Tests:    Clonus: -  Vertebral artery test: -  Alar ligament integrity test: -  Neural Tension: -    Lower Quarter Screen    Postural examination/scapula alignment: apparent R posterior innominate rotation  Palpation: TTP R sacral sulcus, lateral sacral border, iliac crest and glute max, piriformis      Thoracic/Lumbar AROM: Pain/Dysfunction with Movement:   Flexion WNL, fingertips to floor, tension in R low back, over SIJ   Extension WNL, good anterior W/S, sharp pinch in R low back   Right side bending Mod dickens, pinch on R side   Left side bending Min dickens, tension on R side   Right rotation Min dickens, tension over R SIJ   Left rotation WNL, no pain     Hip ROM: WNL  Knee ROM: WNL  Lower Extremity Strength  Right LE  Left LE    Hip flexion: 4/5 Hip flexion: 4/5   Hip extension:  4/5 Hip extension: 4/5   Hip abduction: 4/5 Hip abduction: 4/5   Hip adduction:  5/5 Hip adduction:  5/5   Knee Flexion 5/5 Knee Flexion 5/5   Knee Extension 5/5 Knee Extension 5/5     Flexibility:   Julio C test: Hip flexors: NT  Ely's: Quads: WNL  Ramya test: ITB: NT  Hamstring (SLR): 90 deg francisco    Joint Mobility: hypo throughout TSP (upper/middle > lower), hypo sacral glides (R) with pain     Special Tests:   Test Name  Test Result   Prone Instability Test (--)   Lumbar Quadrant test (--)   Straight Leg Raise (--) for neural tension, (+) for trunk instability   Neural Tension Test (Slump) (--)   Crossed Straight Leg Raise (--)   Walking on toes (--)   Walking on heels  (--)   Clonus (--)   CHAGO (--)   FADIR (--)   SI Joint Provocation Test (compression / distraction) (--)  (+) with posterior sheer     Functional Movement Analysis:   Gait: I  Bed mobility: I  Sit<>stand: I  DL squat: I but c/o discomfort over R LB    Balance: SLS <3" on RLE with sharp pain over SIJ        Limitation/Restriction for " "FOTO CNS disorder (HA) Survey    Therapist reviewed FOTO scores for Chaya Dee on 9/23/2022.   FOTO documents entered into [employer] - see Media section.    Limitation Score: 44%         TREATMENT     Total Treatment time (time-based codes) separate from Evaluation: 25 minutes      Chaya received the treatments listed below:      therapeutic exercises to develop strength, endurance, ROM, flexibility, posture, and core stabilization for 30 minutes including:  MET to correct R innominate rotation 10 x 5" with dowel  Glute set 10 x 10"  Glute set into bridge 10 x 10"  SL clam 3 x 10" - attempted but UA to complete 2* to c/o increased LBP  Supine chin tuck 10 x 10"  Self snag 5 x 5"  Headache snag 5 x 5"  Scap retractions 10 x 10"  Patient education x 5'      manual therapy techniques:   None today.  Pt would be an excellent candidate for dry needling next visit if interested.    neuromuscular re-education activities to improve: Balance, Coordination, Kinesthetic, Sense, Proprioception, and Posture for 00 minutes. The following activities were included:  None today.  Add next visit: prone I, T, Y for scapulothoracic stabilization, seated chin tuck with isometrics, TA activation series for lumbar stabilization        PATIENT EDUCATION AND HOME EXERCISES     Education provided:   - Patient educated regarding pathogenesis, diagnosis, protocol, prognosis, POC, and HEP, including use of visual assistance for understanding of anatomy and dysfunction. Written Home Exercises Provided with written and verbal instructions for frequency and duration of the following exercises: see list above. Pt educated on HEP and activity modifications to reduce c/o pain and improve overall function.   - Pt was educated in posture and body mechanics.  Use of a lumbar roll was recommended and demonstrated here today.  Purchase information provided.   - Pt also educated on use of modalities prn to reduce c/o pain and dysfunction. "       Written Home Exercises Provided: yes. Exercises were reviewed and Chaya was able to demonstrate them prior to the end of the session.  Chaya demonstrated good  understanding of the education provided. See EMR under Patient Instructions for exercises provided during therapy sessions.    ASSESSMENT     Chaya is a 52 y.o. female referred to outpatient Physical Therapy with a medical diagnosis of   M54.2 (ICD-10-CM) - Cervicalgia   G43.009 (ICD-10-CM) - Migraine without aura and without status migrainosus, not intractable     Patient presents with marked limitations in ROM, joint and myofascial mobility, flexibility, strength, postural awareness/endurance, motor control and coordination. S/s associated with referring diagnosis in neck pain due to postural dysfunction, with s/s associated with headaches of cervicogenic nature and dysfunction, and possible LBP due to SIJ dysfunction and lumbopelvic instability . Impairments limit pt with all functional activities including sitting with work activities or standing/walking with HHCs.      Patient prognosis is Good.   Patient will benefit from skilled outpatient Physical Therapy to address the deficits stated above and in the chart below, provide patient /family education, and to maximize patientt's level of independence.     Plan of care discussed with patient: Yes  Patient's spiritual, cultural and educational needs considered and patient is agreeable to the plan of care and goals as stated below:     Anticipated Barriers for therapy: standard, chronicity, occupation    Medical Necessity is demonstrated by the following  History  Co-morbidities and personal factors that may impact the plan of care Co-morbidities:   difficulty sleeping, excessive commute time/distance, and high BMI    Personal Factors:   social background  lifestyle     moderate   Examination  Body Structures and Functions, activity limitations and participation restrictions that may  impact the plan of care Body Regions:   head  neck  back  lower extremities  upper extremities  trunk    Body Systems:    gross symmetry  ROM  strength  gross coordinated movement  balance  transfers  transitions  motor control  motor learning  Joint mobility, flexibility, postural awareness/endurance    Participation Restrictions:   ADLs, HHCs, self care, work activities    Activity limitations:   Learning and applying knowledge  no deficits    General Tasks and Commands  no deficits    Communication  no deficits    Mobility  lifting and carrying objects  walking  moving around using equipment (WC)  driving (bike, car, motorcycle)    Self care  washing oneself (bathing, drying, washing hands)  dressing  looking after one's health    Domestic Life  shopping  cooking  doing house work (cleaning house, washing dishes, laundry)  assisting others    Interactions/Relationships  no deficits    Life Areas  employment    Community and Social Life  community life  recreation and leisure         high   Clinical Presentation evolving clinical presentation with changing clinical characteristics moderate   Decision Making/ Complexity Score: moderate     Goals:  Short Term Goals (6 Weeks):   1. Pt will report 20% reduction in pain of the cervical spine and Lumbar spine for ease with ADL's  2. Pt will demonstrate reduced self reported frequency and duration of headaches by 25%.  3. PT will demonstrate 1/3 MMT improvement in periscapular strength for ease with upright posture.  4. Pt will demonstrate improved cervical spine ROM in all directions by 5 degrees for ease with driving to MD appointments  5. Pt to improve glute ad trunk strength by a half grade to allow increased ease with bending and lifting activities.    Long Term Goals (12 Weeks):   1. Pt will report being independent with HEP for maintenance of improvements gained during therapy sessions  2. PT will report 50% reduction of pain of the neck and back for ease with  dressing and grooming activities.   3. Pt will demonstrate full hip/LE and periscapular strength without the provocation of pain for ease with household chores.  4. Pt will demonstrate appropriate upright posture without external cueing for ease with work related activities.   5. Pt will demonstrate reduced self reported frequency and duration of headaches by 75%.    PLAN   Plan of care Certification: 9/23/2022 to 12/23/2022.    Outpatient Physical Therapy 2 times weekly for 12 weeks to include the following interventions: Aquatic Therapy, Cervical/Lumbar Traction, Electrical Stimulation with dry needling only prn, Iontophoresis (with dexamethasone prn), Manual Therapy, Moist Heat/ Ice, Neuromuscular Re-ed, Patient Education, Self Care, Therapeutic Activities, and Therapeutic Exercise. Progress HEP towards D/C. Recommend F/U with MD if symptoms worsen or do not resolve. Patient may be seen by a PTA for treatment to carry out their plan of care.  Face-to-face conferences will be held. Consider referral to [facility] program if sx no longer progressing.     Niurka Gaitan, PT      I CERTIFY THE NEED FOR THESE SERVICES FURNISHED UNDER THIS PLAN OF TREATMENT AND WHILE UNDER MY CARE   Physician's comments:     Physician's Signature: ___________________________________________________

## 2022-09-26 NOTE — PROGRESS NOTES
"[facility]   Physical Therapy Treatment Note     Name: Chaya Dee  Clinic Number: [id number]    Therapy Diagnosis:   Encounter Diagnoses   Name Primary?    Chronic neck and back pain Yes    Cervicogenic headache      Physician: Tess Haas FNP    Visit Date: 9/26/2022    Physician Orders: PT Eval and Treat   Medical Diagnosis from Referral:   M54.2 (ICD-10-CM) - Cervicalgia   G43.009 (ICD-10-CM) - Migraine without aura and without status migrainosus, not intractable      Evaluation Date: 9/23/2022  Authorization Period Expiration: 9/21/2023  Plan of Care Expiration: 12/23/2022  Progress Note Due: 10/23/2022  Visit # / Visits authorized: 2/ 21   FOTO: 1/3 on 9/23     Precautions: Standard       PTA Visit #: 0/5     Time In: 0745  Time Out: 0830  Total Billable Time: 45 minutes    SUBJECTIVE     Pt reports: no significant changes, neck is feeling tight today. Hesitant with SNAGs when neck is feeling very tight.  She was compliant with home exercise program.  Response to previous treatment: no change  Functional change: no change    Pain: 6/10  Location: left head and neck, right low back/pelvis     OBJECTIVE     Objective Measures updated at progress report unless specified.     Treatment     Chaya received the treatments listed below:    Exercises in bold performed today:     therapeutic exercises to develop strength, endurance, ROM, flexibility, posture, and core stabilization for 15 minutes including:    Patient education prior to dry needling  MET to correct R innominate rotation 10 x 5", shotgun  Glute set 10 x 10"  Glute set into bridge 10 x 10"  SL clam 3 x 10" - attempted but UA to complete 2* to c/o increased LBP  Supine chin tuck 10 x 10"  Self snag 5 x 5"  Headache snag 5 x 5"  Scap retractions 10 x 10"      manual therapy techniques: Joint mobilizations, Soft tissue Mobilization, and Dry Needling were applied to the: neck and low back for 30 minutes, " including:    Joint glides and HVLAT to CSP (no audible cavitation    Application of TDN: Pt educated on benefits and potential side effects of dry needling. Educated pt on benefits, precautions, side effects following TDN. Educated pt to use heat following treatment sessions if pt is experiencing pain or soreness. Pt verbalized good understanding of education.  Pt signed written consent to dry needling. Pt gave verbal consent for DN    Pt received dry needling to the below listed muscles using 30 and 60 mm needles.  R proximal piriformis  R glut med (mid point)    L UT  L LS  L C3 PVM  L suboccipitals (GV16, GB20)  Winding performed every 5 minutes during treatment.      neuromuscular re-education activities to improve: Balance, Coordination, Proprioception, and Posture for 0 minutes. The following activities were included:  None today.  Add next visit: prone I, T, Y for scapulothoracic stabilization, seated chin tuck with isometrics, TA activation series for lumbar stabilization          Patient Education and Home Exercises     Home Exercises Provided and Patient Education Provided     Education provided:   - Therex rationale. Dry needling procedure and consent reviewed prior to treatment    Written Home Exercises Provided: yes. Exercises were reviewed and Chaya was able to demonstrate them prior to the end of the session.  Chaya demonstrated good  understanding of the education provided. See EMR under Patient Instructions for exercises provided during therapy sessions    ASSESSMENT     Pt with marked stiffness noted with lateral glides to upper CSP. HVLAT performed, no audible cavitation noted, min improvement in mobility. Continues with SIJ malalignment, corrects with MET. Dry needling initiated today with pt's written and verbal consent. Good soft tissue response to dry needling evident by increased grasp with unilateral winding at all insertion points. Winding performed every 5 minutes during  treatment. No adverse effects following treatment.    Chaya Is progressing well towards her goals.   Pt prognosis is Good.     Pt will continue to benefit from skilled outpatient physical therapy to address the deficits listed in the problem list box on initial evaluation, provide pt/family education and to maximize pt's level of independence in the home and community environment.     Pt's spiritual, cultural and educational needs considered and pt agreeable to plan of care and goals.     Anticipated barriers to physical therapy: standard, chronicity, occupation    Goals:   Short Term Goals (6 Weeks):   1. Pt will report 20% reduction in pain of the cervical spine and Lumbar spine for ease with ADL's. Progressing, not met 09/26/2022   2. Pt will demonstrate reduced self reported frequency and duration of headaches by 25%.Progressing, not met 09/26/2022   3. PT will demonstrate 1/3 MMT improvement in periscapular strength for ease with upright posture. Progressing, not met 09/26/2022   4. Pt will demonstrate improved cervical spine ROM in all directions by 5 degrees for ease with driving to MD appointments. Progressing, not met 09/26/2022   5. Pt to improve glute ad trunk strength by a half grade to allow increased ease with bending and lifting activities. Progressing, not met 09/26/2022      Long Term Goals (12 Weeks):   1. Pt will report being independent with HEP for maintenance of improvements gained during therapy sessions. Progressing, not met 09/26/2022   2. PT will report 50% reduction of pain of the neck and back for ease with dressing and grooming activities. Progressing, not met 09/26/2022   3. Pt will demonstrate full hip/LE and periscapular strength without the provocation of pain for ease with household chores. Progressing, not met 09/26/2022   4. Pt will demonstrate appropriate upright posture without external cueing for ease with work related activities. Progressing, not met 09/26/2022   5. Pt  will demonstrate reduced self reported frequency and duration of headaches by 75%. Progressing, not met 09/26/2022     PLAN   Plan of care Certification: 9/23/2022 to 12/23/2022.    Continue per POC with focus on improving cervical mobility, soft tissue mobility, and core and postural stability.     Tia Fontanez, PT

## 2022-09-28 ENCOUNTER — CLINICAL SUPPORT (OUTPATIENT)
Dept: REHABILITATION | Facility: OTHER | Age: 52
End: 2022-09-28
Payer: COMMERCIAL

## 2022-09-28 DIAGNOSIS — G89.29 CHRONIC NECK AND BACK PAIN: Primary | ICD-10-CM

## 2022-09-28 DIAGNOSIS — M54.9 CHRONIC NECK AND BACK PAIN: Primary | ICD-10-CM

## 2022-09-28 DIAGNOSIS — G44.86 CERVICOGENIC HEADACHE: ICD-10-CM

## 2022-09-28 DIAGNOSIS — M54.2 CHRONIC NECK AND BACK PAIN: Primary | ICD-10-CM

## 2022-09-28 PROCEDURE — 97112 NEUROMUSCULAR REEDUCATION: CPT | Mod: PN | Performed by: PHYSICAL THERAPIST

## 2022-09-28 PROCEDURE — 97110 THERAPEUTIC EXERCISES: CPT | Mod: PN | Performed by: PHYSICAL THERAPIST

## 2022-09-28 PROCEDURE — 97140 MANUAL THERAPY 1/> REGIONS: CPT | Mod: PN | Performed by: PHYSICAL THERAPIST

## 2022-09-28 NOTE — PROGRESS NOTES
"[facility]   Physical Therapy Treatment Note     Name: Chaya Dee  Clinic Number: [id number]    Therapy Diagnosis:   Encounter Diagnoses   Name Primary?    Chronic neck and back pain Yes    Cervicogenic headache      Physician: Tess Haas FNP    Visit Date: 9/28/2022    Physician Orders: PT Eval and Treat   Medical Diagnosis from Referral:   M54.2 (ICD-10-CM) - Cervicalgia   G43.009 (ICD-10-CM) - Migraine without aura and without status migrainosus, not intractable      Evaluation Date: 9/23/2022  Authorization Period Expiration: 9/21/2023  Plan of Care Expiration: 12/23/2022  Progress Note Due: 10/23/2022  Visit # / Visits authorized: 3/ 21   FOTO: 1/3 on 9/23     Precautions: Standard       PTA Visit #: 0/5     Time In: 1430  Time Out: 1515  Total Billable Time: 45 minutes    SUBJECTIVE     Pt reports: has a HA today across back of neck and into temples. Low back is not hurting as much today.   She was compliant with home exercise program.  Response to previous treatment: relief for a few hours with dry needling and then return of symptoms  Functional change: no change    Pain: 6/10  Location: left head and neck, right low back/pelvis     OBJECTIVE     Objective Measures updated at progress report unless specified.     Treatment     Chaya received the treatments listed below:    Exercises in bold performed today:     therapeutic exercises to develop strength, endurance, ROM, flexibility, posture, and core stabilization for 15 minutes including:    Pt education and assessment  MET to correct R innominate rotation 10 x 5", shotgun  Glute set 10 x 10"  Glute set into bridge 10 x 10"  SL clam 3 x 10" - attempted but UA to complete 2* to c/o increased LBP  Supine chin tuck 10 x 10"  Self snag 5 x 5"  Headache snag 5 x 5"  Scap retractions 10 x 10"      manual therapy techniques: Joint mobilizations, Soft tissue Mobilization, and Dry Needling were applied to the: neck and " "low back for 15 minutes, including:    Joint glides and HVLAT to CSP (no audible cavitation)    Application of TDN: Pt educated on benefits and potential side effects of dry needling. Educated pt on benefits, precautions, side effects following TDN. Educated pt to use heat following treatment sessions if pt is experiencing pain or soreness. Pt verbalized good understanding of education.  Pt signed written consent to dry needling. Pt gave verbal consent for DN    Pt received dry needling to the below listed muscles using 30 and 60 mm needles.  R proximal piriformis  R glut med (mid point)    B UT  B LS  L C3 PVM  B suboccipitals (GV16, GB20)  B scalene  Winding performed every 5 minutes during treatment.      neuromuscular re-education activities to improve: Balance, Coordination, Proprioception, and Posture for 15 minutes. The following activities were included:  +TrA 5" x 2 min  +TrA 5"  20  +Brace marching x 2min  None today.  Add next visit: prone I, T, Y for scapulothoracic stabilization, seated chin tuck with isometrics, TA activation series for lumbar stabilization      Moist heat to CSP concurrent with therex x 10 min    Patient Education and Home Exercises     Home Exercises Provided and Patient Education Provided     Education provided:   - Therex rationale. Dry needling procedure and consent reviewed prior to treatment    Written Home Exercises Provided: yes. Exercises were reviewed and Chaya was able to demonstrate them prior to the end of the session.  Chaya demonstrated good  understanding of the education provided. See EMR under Patient Instructions for exercises provided during therapy sessions    ASSESSMENT     Dry needling continued to CSP today with good soft tissue response at all insertion points. Initiated core stabilization exercises today. Pt with tightness noted to coccyx with TrA, more so with pelvic tilt. May benefit from referral to pelvic floor if this persists.     Chaya Is " progressing well towards her goals.   Pt prognosis is Good.     Pt will continue to benefit from skilled outpatient physical therapy to address the deficits listed in the problem list box on initial evaluation, provide pt/family education and to maximize pt's level of independence in the home and community environment.     Pt's spiritual, cultural and educational needs considered and pt agreeable to plan of care and goals.     Anticipated barriers to physical therapy: standard, chronicity, occupation    Goals:   Short Term Goals (6 Weeks):   1. Pt will report 20% reduction in pain of the cervical spine and Lumbar spine for ease with ADL's. Progressing, not met 09/28/2022   2. Pt will demonstrate reduced self reported frequency and duration of headaches by 25%.Progressing, not met 09/28/2022   3. PT will demonstrate 1/3 MMT improvement in periscapular strength for ease with upright posture. Progressing, not met 09/28/2022   4. Pt will demonstrate improved cervical spine ROM in all directions by 5 degrees for ease with driving to MD appointments. Progressing, not met 09/28/2022   5. Pt to improve glute ad trunk strength by a half grade to allow increased ease with bending and lifting activities. Progressing, not met 09/28/2022      Long Term Goals (12 Weeks):   1. Pt will report being independent with HEP for maintenance of improvements gained during therapy sessions. Progressing, not met 09/28/2022   2. PT will report 50% reduction of pain of the neck and back for ease with dressing and grooming activities. Progressing, not met 09/28/2022   3. Pt will demonstrate full hip/LE and periscapular strength without the provocation of pain for ease with household chores. Progressing, not met 09/28/2022   4. Pt will demonstrate appropriate upright posture without external cueing for ease with work related activities. Progressing, not met 09/28/2022   5. Pt will demonstrate reduced self reported frequency and duration of  headaches by 75%. Progressing, not met 09/28/2022     PLAN   Plan of care Certification: 9/23/2022 to 12/23/2022.    Continue per POC with focus on improving cervical mobility, soft tissue mobility, and core and postural stability.     Tia Fontanez, PT

## 2022-09-30 ENCOUNTER — OFFICE VISIT (OUTPATIENT)
Dept: INTERNAL MEDICINE | Facility: CLINIC | Age: 52
End: 2022-09-30
Payer: COMMERCIAL

## 2022-09-30 ENCOUNTER — LAB VISIT (OUTPATIENT)
Dept: LAB | Facility: HOSPITAL | Age: 52
End: 2022-09-30
Payer: COMMERCIAL

## 2022-09-30 VITALS
HEIGHT: 64 IN | DIASTOLIC BLOOD PRESSURE: 70 MMHG | OXYGEN SATURATION: 98 % | HEART RATE: 79 BPM | BODY MASS INDEX: 25.82 KG/M2 | WEIGHT: 151.25 LBS | SYSTOLIC BLOOD PRESSURE: 94 MMHG

## 2022-09-30 DIAGNOSIS — M06.9 RHEUMATOID ARTHRITIS, INVOLVING UNSPECIFIED SITE, UNSPECIFIED WHETHER RHEUMATOID FACTOR PRESENT: ICD-10-CM

## 2022-09-30 DIAGNOSIS — R74.8 ELEVATED LIVER ENZYMES: ICD-10-CM

## 2022-09-30 DIAGNOSIS — Z00.00 ANNUAL PHYSICAL EXAM: Primary | ICD-10-CM

## 2022-09-30 DIAGNOSIS — G43.009 MIGRAINE WITHOUT AURA AND WITHOUT STATUS MIGRAINOSUS, NOT INTRACTABLE: ICD-10-CM

## 2022-09-30 LAB
HBV CORE AB SERPL QL IA: NORMAL
HBV SURFACE AG SERPL QL IA: NORMAL
HCV AB SERPL QL IA: NORMAL
IRON SERPL-MCNC: 87 UG/DL (ref 30–160)
SATURATED IRON: 20 % (ref 20–50)
TOTAL IRON BINDING CAPACITY: 444 UG/DL (ref 250–450)
TRANSFERRIN SERPL-MCNC: 300 MG/DL (ref 200–375)
TRANSFERRIN SERPL-MCNC: 300 MG/DL (ref 200–375)

## 2022-09-30 PROCEDURE — 99386 PR PREVENTIVE VISIT,NEW,40-64: ICD-10-PCS | Mod: 25,S$GLB,, | Performed by: NURSE PRACTITIONER

## 2022-09-30 PROCEDURE — 87340 HEPATITIS B SURFACE AG IA: CPT | Performed by: NURSE PRACTITIONER

## 2022-09-30 PROCEDURE — 99386 PREV VISIT NEW AGE 40-64: CPT | Mod: 25,S$GLB,, | Performed by: NURSE PRACTITIONER

## 2022-09-30 PROCEDURE — 90471 IMMUNIZATION ADMIN: CPT | Mod: S$GLB,,, | Performed by: NURSE PRACTITIONER

## 2022-09-30 PROCEDURE — 86704 HEP B CORE ANTIBODY TOTAL: CPT | Performed by: NURSE PRACTITIONER

## 2022-09-30 PROCEDURE — 86803 HEPATITIS C AB TEST: CPT | Performed by: NURSE PRACTITIONER

## 2022-09-30 PROCEDURE — 84466 ASSAY OF TRANSFERRIN: CPT | Performed by: NURSE PRACTITIONER

## 2022-09-30 PROCEDURE — 86706 HEP B SURFACE ANTIBODY: CPT | Performed by: NURSE PRACTITIONER

## 2022-09-30 PROCEDURE — 90686 FLU VACCINE (QUAD) GREATER THAN OR EQUAL TO 3YO PRESERVATIVE FREE IM: ICD-10-PCS | Mod: S$GLB,,, | Performed by: NURSE PRACTITIONER

## 2022-09-30 PROCEDURE — 90471 FLU VACCINE (QUAD) GREATER THAN OR EQUAL TO 3YO PRESERVATIVE FREE IM: ICD-10-PCS | Mod: S$GLB,,, | Performed by: NURSE PRACTITIONER

## 2022-09-30 PROCEDURE — 90686 IIV4 VACC NO PRSV 0.5 ML IM: CPT | Mod: S$GLB,,, | Performed by: NURSE PRACTITIONER

## 2022-09-30 PROCEDURE — 99999 PR PBB SHADOW E&M-EST. PATIENT-LVL IV: CPT | Mod: PBBFAC,,, | Performed by: NURSE PRACTITIONER

## 2022-09-30 PROCEDURE — 99999 PR PBB SHADOW E&M-EST. PATIENT-LVL IV: ICD-10-PCS | Mod: PBBFAC,,, | Performed by: NURSE PRACTITIONER

## 2022-09-30 RX ORDER — FAMOTIDINE 40 MG/1
TABLET, FILM COATED ORAL
COMMUNITY
Start: 2022-09-28 | End: 2023-08-30

## 2022-09-30 RX ORDER — CYCLOBENZAPRINE HCL 5 MG
5 TABLET ORAL 2 TIMES DAILY PRN
COMMUNITY
Start: 2022-06-11 | End: 2022-12-28

## 2022-09-30 NOTE — PROGRESS NOTES
Two patient Identifier confirmed. (Name and ) Patient tolerated shot well. Patient was instructed to wait 15 minutes after injection.

## 2022-09-30 NOTE — PROGRESS NOTES
INTERNAL MEDICINE PROGRESS NOTE    CHIEF COMPLAINT     Chief Complaint   Patient presents with    Follow-up     labs         HPI     Chaya Dee is a 52 y.o. female with RA, migraines, cervicogenic headaches possible r/t back and neck pain, AR, endometriosis, and chronic sinusitis who presents for a follow up visit today.    She is an established pt of Dr Lewis     Migraines/headaches- seen by neurology and started back on topamax 50mg BID for prevention; maxalt as needed for abortive therapy. Tizanidine as needed for neck pain.   Takes tylenol for pain - 3x/week   Taking gabapentin 200mg nightly      RA- followed by rheumatology. Weaned off plaquenil 5/2022  Weekly B12 injections     On recent CMP ordered by Neurology - bilirubin 1.3; AST and ALT elevate: 54 and 75    Covid 19 once 12/2021    Hepatitis B: HBsAg, antibody to HBsAg, anti-HBc.  ?Hepatitis C: Anti-HCV.  ?Hemochromatosis: Serum iron and total iron binding capacity (TIBC) with calculation of transferrin saturation (serum iron/TIBC). A transferrin saturation greater than 45 percent warrants obtaining a serum ferritin. Ferritin is less useful as an initial test because it is an acute phase reactant and therefore less specific than the transferrin saturation. A serum ferritin concentration of greater than 400 ng/mL (900 pmol/L) in men and 300 ng/mL (675 pmol/L) in women further supports (but does not confirm) the diagnosis of hemochromatosis.   ?Nonalcoholic fatty liver disease: The initial evaluation to identify the presence of fatty infiltration of the liver is radiologic imaging, usually ultrasonography, or possibly computed tomography (CT) or magnetic resonance imaging (MRI). Ultrasonography has a lower sensitivity than CT or MRI but is less expensive.     HM-  C-scope- 2018 Dr rey   PAP- 4/2022      Past Medical History:  Past Medical History:   Diagnosis Date    Allergy     Chronic allergic rhinitis 3/19/2016    Endometriosis      Laryngopharyngeal reflux disease 03/2018    PONV (postoperative nausea and vomiting)     Recurrent upper respiratory infection (URI)        Home Medications:  Prior to Admission medications    Medication Sig Start Date End Date Taking? Authorizing Provider   celecoxib (CELEBREX) 200 MG capsule Take 200 mg by mouth as needed for Pain.    Historical Provider   cholecalciferol, vitamin D3, 125 mcg (5,000 unit) Tab Take by mouth.    Historical Provider   cyanocobalamin 1,000 mcg/mL injection every 14 (fourteen) days.  5/12/18   Historical Provider   famotidine (PEPCID) 20 MG tablet 40 mg.  4/22/20   Historical Provider   gabapentin (NEURONTIN) 100 MG capsule Take 200 mg by mouth every evening.    Historical Provider   hydroxychloroquine (PLAQUENIL) 200 mg tablet Take 200 mg by mouth once daily.    Historical Provider   pantoprazole sodium (PANTOPRAZOLE ORAL) Take 40 mg by mouth before evening meal. 1/14/22   Historical Provider   rizatriptan (MAXALT-MLT) 10 MG disintegrating tablet Take 1 tablet (10 mg total) by mouth every 2 (two) hours as needed for Migraine. Max 30 mg/day. 3/30/22   MERARI Wu   tiZANidine (ZANAFLEX) 2 MG tablet Take 1-2 tabs around bedtime. No alcohol, no driving with medication. 9/21/22   MERARI Wu   topiramate (TOPAMAX) 50 MG tablet Take 1 tablet (50 mg total) by mouth 2 (two) times daily. 9/21/22   MERARI Wu       Review of Systems:  Review of Systems   Constitutional:  Positive for chills. Negative for fatigue, fever and unexpected weight change.   HENT:  Negative for congestion, hearing loss, rhinorrhea and sinus pressure.    Eyes:  Negative for pain, redness and visual disturbance.   Respiratory:  Negative for cough, shortness of breath and wheezing.    Cardiovascular:  Negative for chest pain and palpitations.   Gastrointestinal:  Negative for abdominal distention, abdominal pain, constipation, diarrhea, nausea and vomiting.   Endocrine: Negative for  "cold intolerance, heat intolerance, polydipsia, polyphagia and polyuria.   Genitourinary:  Negative for dysuria, frequency, urgency and vaginal discharge.   Musculoskeletal:  Positive for back pain and neck pain. Negative for arthralgias, gait problem and myalgias.   Skin:  Negative for color change and rash.   Allergic/Immunologic: Negative for environmental allergies and immunocompromised state.   Neurological:  Positive for headaches. Negative for dizziness, weakness and light-headedness.   Hematological:  Negative for adenopathy. Does not bruise/bleed easily.   Psychiatric/Behavioral:  Negative for confusion and sleep disturbance. The patient is not nervous/anxious.      Health Maintainence:   Immunizations:  Health Maintenance         Date Due Completion Date    Hepatitis C Screening Never done ---    Cervical Cancer Screening Never done ---    HIV Screening Never done ---    TETANUS VACCINE Never done ---    Colorectal Cancer Screening Never done ---    Shingles Vaccine (1 of 2) Never done ---    Lipid Panel 04/03/2021 4/3/2016    COVID-19 Vaccine (4 - Booster for Pfizer series) 01/19/2022 11/24/2021    Influenza Vaccine (1) 09/01/2022 10/19/2021    Mammogram 04/21/2023 4/21/2022    Override on 2/19/2016: Done             PHYSICAL EXAM     BP 94/70 (BP Location: Left arm, Patient Position: Sitting, BP Method: Medium (Manual))   Pulse 79   Ht 5' 4" (1.626 m)   Wt 68.6 kg (151 lb 3.8 oz)   SpO2 98%   BMI 25.96 kg/m²     Physical Exam  Vitals reviewed.   Constitutional:       Appearance: She is well-developed.   HENT:      Head: Normocephalic.      Right Ear: External ear normal.      Left Ear: External ear normal.      Nose: Nose normal.      Mouth/Throat:      Pharynx: No oropharyngeal exudate.   Eyes:      Pupils: Pupils are equal, round, and reactive to light.   Neck:      Thyroid: No thyromegaly.      Vascular: No JVD.      Trachea: No tracheal deviation.   Cardiovascular:      Rate and Rhythm: Normal " rate and regular rhythm.      Heart sounds: Normal heart sounds. No murmur heard.    No friction rub. No gallop.   Pulmonary:      Effort: Pulmonary effort is normal. No respiratory distress.      Breath sounds: Normal breath sounds. No wheezing or rales.   Abdominal:      General: Bowel sounds are normal. There is no distension.      Palpations: Abdomen is soft.      Tenderness: There is no abdominal tenderness.   Musculoskeletal:         General: No tenderness. Normal range of motion.      Cervical back: Neck supple.   Lymphadenopathy:      Cervical: No cervical adenopathy.   Skin:     General: Skin is warm and dry.      Findings: No rash.   Neurological:      Mental Status: She is alert and oriented to person, place, and time.   Psychiatric:         Behavior: Behavior normal.       LABS     No results found for: LABA1C, HGBA1C  CMP  Sodium   Date Value Ref Range Status   09/21/2022 139 136 - 145 mmol/L Final     Potassium   Date Value Ref Range Status   09/21/2022 4.3 3.5 - 5.1 mmol/L Final     Chloride   Date Value Ref Range Status   09/21/2022 106 95 - 110 mmol/L Final     CO2   Date Value Ref Range Status   09/21/2022 26 23 - 29 mmol/L Final     Glucose   Date Value Ref Range Status   09/21/2022 93 70 - 110 mg/dL Final     BUN   Date Value Ref Range Status   09/21/2022 17 6 - 20 mg/dL Final     Creatinine   Date Value Ref Range Status   09/21/2022 0.9 0.5 - 1.4 mg/dL Final     Calcium   Date Value Ref Range Status   09/21/2022 9.7 8.7 - 10.5 mg/dL Final     Total Protein   Date Value Ref Range Status   09/21/2022 6.9 6.0 - 8.4 g/dL Final     Albumin   Date Value Ref Range Status   09/21/2022 4.4 3.5 - 5.2 g/dL Final     Total Bilirubin   Date Value Ref Range Status   09/21/2022 1.3 (H) 0.1 - 1.0 mg/dL Final     Comment:     For infants and newborns, interpretation of results should be based  on gestational age, weight and in agreement with clinical  observations.    Premature Infant recommended reference  ranges:  Up to 24 hours.............<8.0 mg/dL  Up to 48 hours............<12.0 mg/dL  3-5 days..................<15.0 mg/dL  6-29 days.................<15.0 mg/dL       Alkaline Phosphatase   Date Value Ref Range Status   09/21/2022 93 55 - 135 U/L Final     AST   Date Value Ref Range Status   09/21/2022 54 (H) 10 - 40 U/L Final     ALT   Date Value Ref Range Status   09/21/2022 75 (H) 10 - 44 U/L Final     Anion Gap   Date Value Ref Range Status   09/21/2022 7 (L) 8 - 16 mmol/L Final     eGFR if    Date Value Ref Range Status   04/03/2016 >60.0 >60 mL/min/1.73 m^2 Final     eGFR if non    Date Value Ref Range Status   04/03/2016 >60.0 >60 mL/min/1.73 m^2 Final     Comment:     Calculation used to obtain the estimated glomerular filtration  rate (eGFR) is the CKD-EPI equation. Since race is unknown   in our information system, the eGFR values for   -American and Non--American patients are given   for each creatinine result.       Lab Results   Component Value Date    WBC 9.34 04/03/2016    HGB 13.7 04/03/2016    HCT 44.2 04/03/2016    MCV 92 04/03/2016     04/03/2016     Lab Results   Component Value Date    CHOL 218 (H) 04/03/2016    CHOL 158 06/19/2007     Lab Results   Component Value Date    HDL 62 04/03/2016    HDL 52 06/19/2007     Lab Results   Component Value Date    LDLCALC 133.0 04/03/2016    LDLCALC 92.8 06/19/2007     Lab Results   Component Value Date    TRIG 115 04/03/2016    TRIG 66 06/19/2007     Lab Results   Component Value Date    CHOLHDL 28.4 04/03/2016    CHOLHDL 32.9 06/19/2007     Lab Results   Component Value Date    TSH 0.695 09/21/2022       ASSESSMENT/PLAN     Chayamoira Dee is a 52 y.o. female     Annual physical exam- All age and gender related screenings discussed     Migraine without aura and without status migrainosus, not intractable- stable. Will cont Topamax as needed. Will f/u with neurology     Rheumatoid arthritis,  involving unspecified site, unspecified whether rheumatoid factor present- stable. Off plaquenil. Will monitor     Elevated liver enzymes- will decrease use of muscle relaxer. Limit etoh and tylenol. Check u/s and labs today   -     TRANSFERRIN; Future; Expected date: 09/30/2022  -     Iron and TIBC; Future; Expected date: 09/30/2022  -     HEPATITIS C ANTIBODY; Future; Expected date: 09/30/2022  -     HEPATITIS B SURFACE ANTIGEN; Future; Expected date: 09/30/2022  -     HEPATITIS B CORE ANTIBODY, TOTAL; Future; Expected date: 09/30/2022  -     Hepatitis B Surface Antibody, Qual/Quant; Future; Expected date: 09/30/2022  -     US Abdomen Limited; Future; Expected date: 09/30/2022    Other orders  -     Influenza - Quadrivalent *Preferred* (6 months+) (PF)           Follow up with PCP in 1 year  for annual or before as needed     Patient education provided from [facility]. Patient was counseled on when and how to seek emergent care.       Didi BARRERA, APRN, FNP-c   Department of Internal Medicine - [facility]  10:16 AM

## 2022-10-01 ENCOUNTER — IMMUNIZATION (OUTPATIENT)
Dept: PRIMARY CARE CLINIC | Facility: CLINIC | Age: 52
End: 2022-10-01
Payer: COMMERCIAL

## 2022-10-01 DIAGNOSIS — Z23 NEED FOR VACCINATION: Primary | ICD-10-CM

## 2022-10-01 PROCEDURE — 0124A COVID-19, MRNA, LNP-S, BIVALENT BOOSTER, PF, 30 MCG/0.3 ML DOSE: CPT | Mod: CV19,PBBFAC | Performed by: INTERNAL MEDICINE

## 2022-10-01 PROCEDURE — 91312 COVID-19, MRNA, LNP-S, BIVALENT BOOSTER, PF, 30 MCG/0.3 ML DOSE: CPT | Mod: PBBFAC | Performed by: INTERNAL MEDICINE

## 2022-10-03 ENCOUNTER — CLINICAL SUPPORT (OUTPATIENT)
Dept: REHABILITATION | Facility: OTHER | Age: 52
End: 2022-10-03
Payer: COMMERCIAL

## 2022-10-03 DIAGNOSIS — G89.29 CHRONIC NECK AND BACK PAIN: Primary | ICD-10-CM

## 2022-10-03 DIAGNOSIS — G44.86 CERVICOGENIC HEADACHE: ICD-10-CM

## 2022-10-03 DIAGNOSIS — M54.9 CHRONIC NECK AND BACK PAIN: Primary | ICD-10-CM

## 2022-10-03 DIAGNOSIS — M54.2 CHRONIC NECK AND BACK PAIN: Primary | ICD-10-CM

## 2022-10-03 LAB
HBV SURFACE AB SER QL IA: NEGATIVE
HBV SURFACE AB SERPL IA-ACNC: <3 MIU/ML

## 2022-10-03 PROCEDURE — 97112 NEUROMUSCULAR REEDUCATION: CPT | Mod: PN

## 2022-10-03 PROCEDURE — 97140 MANUAL THERAPY 1/> REGIONS: CPT | Mod: PN

## 2022-10-03 NOTE — PROGRESS NOTES
"[facility]   Physical Therapy Treatment Note     Name: Chaya Escudero Luiz  Clinic Number: [id number]    Therapy Diagnosis:   Encounter Diagnoses   Name Primary?    Chronic neck and back pain Yes    Cervicogenic headache      Physician: Tess Haas FNP    Visit Date: 10/3/2022    Physician Orders: PT Eval and Treat   Medical Diagnosis from Referral:   M54.2 (ICD-10-CM) - Cervicalgia   G43.009 (ICD-10-CM) - Migraine without aura and without status migrainosus, not intractable      Evaluation Date: 9/23/2022  Authorization Period Expiration: 9/21/2023  Plan of Care Expiration: 12/23/2022  Progress Note Due: 10/23/2022  Visit # / Visits authorized: ***3/ 21   FOTO: 1/3 on 9/23     Precautions: Standard       PTA Visit #: 0/5     Time In: ***  Time Out: ***  Total Billable Time: *** minutes    SUBJECTIVE     Pt reports: ***   She was compliant with home exercise program.  Response to previous treatment: relief for a few hours with dry needling and then return of symptoms  Functional change: no change    Pain: 6/10  Location: left head and neck, right low back/pelvis     OBJECTIVE     Objective Measures updated at progress report unless specified.     Treatment     Chaya received the treatments listed below:    Exercises in bold performed today:     therapeutic exercises to develop strength, endurance, ROM, flexibility, posture, and core stabilization for 15 minutes including:    Pt education and assessment  MET to correct R innominate rotation 10 x 5", shotgun  Glute set 10 x 10"  Glute set into bridge 10 x 10"  SL clam 3 x 10" - attempted but UA to complete 2* to c/o increased LBP  Supine chin tuck 10 x 10"  Self snag 5 x 5"  Headache snag 5 x 5"  Scap retractions 10 x 10"      manual therapy techniques: Joint mobilizations, Soft tissue Mobilization, and Dry Needling were applied to the: neck and low back for 15 minutes, including:    Joint glides and HVLAT to CSP (no audible " "cavitation)    Application of TDN: Pt educated on benefits and potential side effects of dry needling. Educated pt on benefits, precautions, side effects following TDN. Educated pt to use heat following treatment sessions if pt is experiencing pain or soreness. Pt verbalized good understanding of education.  Pt signed written consent to dry needling. Pt gave verbal consent for DN    Pt received dry needling to the below listed muscles using 30 and 60 mm needles.  R proximal piriformis  R glut med (mid point)    B UT  B LS  L C3 PVM  B suboccipitals (GV16, GB20)  B scalene  Winding performed every 5 minutes during treatment.      neuromuscular re-education activities to improve: Balance, Coordination, Proprioception, and Posture for 15 minutes. The following activities were included:  +TrA 5" x 2 min  +TrA 5"  20  +Brace marching x 2min  None today.  Add next visit: prone I, T, Y for scapulothoracic stabilization, seated chin tuck with isometrics, TA activation series for lumbar stabilization      Moist heat to CSP concurrent with therex x 10 min    Patient Education and Home Exercises     Home Exercises Provided and Patient Education Provided     Education provided:   - Therex rationale. Dry needling procedure and consent reviewed prior to treatment    Written Home Exercises Provided: yes. Exercises were reviewed and Chaya was able to demonstrate them prior to the end of the session.  Chaya demonstrated good  understanding of the education provided. See EMR under Patient Instructions for exercises provided during therapy sessions    ASSESSMENT     ***     Chaya Is progressing well towards her goals.   Pt prognosis is Good.     Pt will continue to benefit from skilled outpatient physical therapy to address the deficits listed in the problem list box on initial evaluation, provide pt/family education and to maximize pt's level of independence in the home and community environment.     Pt's spiritual, " cultural and educational needs considered and pt agreeable to plan of care and goals.     Anticipated barriers to physical therapy: standard, chronicity, occupation    Goals:   Short Term Goals (6 Weeks):   1. Pt will report 20% reduction in pain of the cervical spine and Lumbar spine for ease with ADL's. Progressing, not met 10/03/2022   2. Pt will demonstrate reduced self reported frequency and duration of headaches by 25%.Progressing, not met 10/03/2022   3. PT will demonstrate 1/3 MMT improvement in periscapular strength for ease with upright posture. Progressing, not met 10/03/2022   4. Pt will demonstrate improved cervical spine ROM in all directions by 5 degrees for ease with driving to MD appointments. Progressing, not met 10/03/2022   5. Pt to improve glute ad trunk strength by a half grade to allow increased ease with bending and lifting activities. Progressing, not met 10/03/2022      Long Term Goals (12 Weeks):   1. Pt will report being independent with HEP for maintenance of improvements gained during therapy sessions. Progressing, not met 10/03/2022   2. PT will report 50% reduction of pain of the neck and back for ease with dressing and grooming activities. Progressing, not met 10/03/2022   3. Pt will demonstrate full hip/LE and periscapular strength without the provocation of pain for ease with household chores. Progressing, not met 10/03/2022   4. Pt will demonstrate appropriate upright posture without external cueing for ease with work related activities. Progressing, not met 10/03/2022   5. Pt will demonstrate reduced self reported frequency and duration of headaches by 75%. Progressing, not met 10/03/2022     PLAN   Plan of care Certification: 9/23/2022 to 12/23/2022.    Continue per POC with focus on improving cervical mobility, soft tissue mobility, and core and postural stability.     Tia Fontanez, PT

## 2022-10-03 NOTE — PROGRESS NOTES
"[facility]   Physical Therapy Treatment Note     Name: Chaya Escudero Luiz  Clinic Number: [id number]    Therapy Diagnosis:   Encounter Diagnoses   Name Primary?    Chronic neck and back pain Yes    Cervicogenic headache      Physician: Tess Haas FNP    Visit Date: 10/3/2022    Physician Orders: PT Eval and Treat   Medical Diagnosis from Referral:   M54.2 (ICD-10-CM) - Cervicalgia   G43.009 (ICD-10-CM) - Migraine without aura and without status migrainosus, not intractable      Evaluation Date: 9/23/2022  Authorization Period Expiration: 9/21/2023  Plan of Care Expiration: 12/23/2022  Progress Note Due: 10/23/2022  Visit # / Visits authorized: 4/ 21   FOTO: 1/3 on 9/23     Precautions: Standard       PTA Visit #: 0/5     Time In: 830  Time Out: 930  Total Billable Time: 45 minutes 1:1 (60 min total)    SUBJECTIVE     Pt reports: min change in L neck and R low back pain since previous visi. Says exercises help but only temporarily.    She was compliant with home exercise program.  Response to previous treatment: relief for a few hours with dry needling and then return of symptoms  Functional change: no change    Pain: 6/10  Location: left head and neck, right low back/pelvis     OBJECTIVE     Objective Measures updated at progress report unless specified.     Treatment     Chaya received the treatments listed below:    Exercises in bold performed today:     therapeutic exercises to develop strength, endurance, ROM, flexibility, posture, and core stabilization for 20 minutes including:    Assessment x 5'  MET to correct R innominate rotation 10 x 5", shotgun  Glute set 10 x 10"  Glute set into bridge 10 x 3"  SL clam x 20  +SL reverse clams x 20  +SL hip abd x 20  Supine chin tuck 10 x 10"  Self snag 5 x 5"  Headache snag 5 x 5"  Scap retractions 10 x 10"      manual therapy techniques: Joint mobilizations, Soft tissue Mobilization, and Dry Needling were applied to the: neck " "and low back for 25 minutes, including:    Manual MET to correct anterior innominate rotation (R), innominate ER, trunk rotation     Joint glides and HVLAT to CSP (no audible cavitation)    Application of TDN: Pt educated on benefits and potential side effects of dry needling. Educated pt on benefits, precautions, side effects following TDN. Educated pt to use heat following treatment sessions if pt is experiencing pain or soreness. Pt verbalized good understanding of education.  Pt signed written consent to dry needling. Pt gave verbal consent for DN    Pt received dry needling to the below listed muscles using 30 and 60 mm needles.  R proximal piriformis  R glut med (mid point)    B UT  B LS  L C3 PVM  B suboccipitals (GV16, GB20)  B scalene   Winding performed every 5 minutes during treatment.      neuromuscular re-education activities to improve: Balance, Coordination, Proprioception, and Posture for 15 minutes. The following activities were included:  TrA 5" x 2 min  TrA 5"  20  Brace marching x 2min  None today.  Add next visit: prone I, T, Y for scapulothoracic stabilization, seated chin tuck with isometrics, TA activation series for lumbar stabilization      Moist heat to CSP concurrent with therex x 10 min  Modalities:   Chaya received training and instruction, as well as, monitoring of Iontophoresis patch with a 1.3 mL of Dexamethsone at a rate of 80 mA/min to the R SIJ. Safety and patient removal discussed.     Patient Education and Home Exercises     Home Exercises Provided and Patient Education Provided     Education provided:   - Therex rationale. Dry needling procedure and consent reviewed prior to treatment    Written Home Exercises Provided: yes. Exercises were reviewed and Chaya was able to demonstrate them prior to the end of the session.  Chaya demonstrated good  understanding of the education provided. See EMR under Patient Instructions for exercises provided during therapy " sessions    ASSESSMENT     Assessment peformed, with pt presenting with increased anterior innominate rotation of R SIJ. PT-assisted MET performed today, with good tolerance and notable improvement in pain modulation and ambulation afterwards. Ionto patch added to R SIJ today for continued pain control, with emphasis on continuing trunk and hip strengthening at home to improve joint stabilization and reduce c/o pain. Dry needling continued to CSP today with good soft tissue response at all insertion points. Attempted to perform bridges for lateral hip stability with ball squeeze, but pt notes increased c/o SIJ pain and down into coccyx.    Chaya Is progressing well towards her goals.   Pt prognosis is Good.     Pt will continue to benefit from skilled outpatient physical therapy to address the deficits listed in the problem list box on initial evaluation, provide pt/family education and to maximize pt's level of independence in the home and community environment.     Pt's spiritual, cultural and educational needs considered and pt agreeable to plan of care and goals.     Anticipated barriers to physical therapy: standard, chronicity, occupation    Goals:   Short Term Goals (6 Weeks):   1. Pt will report 20% reduction in pain of the cervical spine and Lumbar spine for ease with ADL's. Progressing, not met 10/03/2022   2. Pt will demonstrate reduced self reported frequency and duration of headaches by 25%.Progressing, not met 10/03/2022   3. PT will demonstrate 1/3 MMT improvement in periscapular strength for ease with upright posture. Progressing, not met 10/03/2022   4. Pt will demonstrate improved cervical spine ROM in all directions by 5 degrees for ease with driving to MD appointments. Progressing, not met 10/03/2022   5. Pt to improve glute ad trunk strength by a half grade to allow increased ease with bending and lifting activities. Progressing, not met 10/03/2022      Long Term Goals (12 Weeks):   1. Pt  will report being independent with HEP for maintenance of improvements gained during therapy sessions. Progressing, not met 10/03/2022   2. PT will report 50% reduction of pain of the neck and back for ease with dressing and grooming activities. Progressing, not met 10/03/2022   3. Pt will demonstrate full hip/LE and periscapular strength without the provocation of pain for ease with household chores. Progressing, not met 10/03/2022   4. Pt will demonstrate appropriate upright posture without external cueing for ease with work related activities. Progressing, not met 10/03/2022   5. Pt will demonstrate reduced self reported frequency and duration of headaches by 75%. Progressing, not met 10/03/2022     PLAN   Plan of care Certification: 9/23/2022 to 12/23/2022.    Continue per POC with focus on improving cervical mobility, soft tissue mobility, and core and postural stability.     Niurka Gaitan, PT

## 2022-10-05 ENCOUNTER — CLINICAL SUPPORT (OUTPATIENT)
Dept: REHABILITATION | Facility: OTHER | Age: 52
End: 2022-10-05
Payer: COMMERCIAL

## 2022-10-05 DIAGNOSIS — M54.2 CHRONIC NECK AND BACK PAIN: Primary | ICD-10-CM

## 2022-10-05 DIAGNOSIS — M54.9 CHRONIC NECK AND BACK PAIN: Primary | ICD-10-CM

## 2022-10-05 DIAGNOSIS — G89.29 CHRONIC NECK AND BACK PAIN: Primary | ICD-10-CM

## 2022-10-05 DIAGNOSIS — G44.86 CERVICOGENIC HEADACHE: ICD-10-CM

## 2022-10-05 PROCEDURE — 97110 THERAPEUTIC EXERCISES: CPT | Mod: PN

## 2022-10-05 PROCEDURE — 97140 MANUAL THERAPY 1/> REGIONS: CPT | Mod: PN

## 2022-10-05 NOTE — PROGRESS NOTES
"[facility]   Physical Therapy Treatment Note     Name: Chaya Escudero Luiz  Clinic Number: [id number]    Therapy Diagnosis:   Encounter Diagnoses   Name Primary?    Chronic neck and back pain Yes    Cervicogenic headache      Physician: Tess Haas FNP    Visit Date: 10/5/2022    Physician Orders: PT Eval and Treat   Medical Diagnosis from Referral:   M54.2 (ICD-10-CM) - Cervicalgia   G43.009 (ICD-10-CM) - Migraine without aura and without status migrainosus, not intractable      Evaluation Date: 9/23/2022  Authorization Period Expiration: 9/21/2023  Plan of Care Expiration: 12/23/2022  Progress Note Due: 10/23/2022  Visit # / Visits authorized: 4/ 21   FOTO: 1/3 on 9/23     Precautions: Standard       PTA Visit #: 0/5     Time In: 10:00  Time Out: 10:45  Total Billable Time: 45 minutes     SUBJECTIVE     Pt reports: min change in L neck and R low back pain since previous visit. Says exercises help but only temporarily.    She was compliant with home exercise program.  Response to previous treatment: relief for a few hours with dry needling and then return of symptoms  Functional change: no change    Pain: 6/10  Location: left head and neck, right low back/pelvis     OBJECTIVE     Objective Measures updated at progress report unless specified.     Treatment     Chaya received the treatments listed below:    Exercises in bold performed today:     therapeutic exercises to develop strength, endurance, ROM, flexibility, posture, and core stabilization for 20 minutes including:    MET to correct R innominate rotation 10 x 5", shotgun  Glute set 10 x 10"  Glute set into bridge 10 x 3"  SL clam x 20  +SL reverse clams x 20  +SL hip abd x 20  Supine chin tuck 10 x 10"  Self snag 5 x 5"  Headache snag 5 x 5"  Scap retractions 10 x 10"      manual therapy techniques: Joint mobilizations, Soft tissue Mobilization, and Dry Needling were applied to the: neck and low back for 25 minutes, " "including:    Manual MET to correct anterior innominate rotation (R), innominate ER, trunk rotation     Joint glides and HVLAT to CSP (no audible cavitation)    Application of TDN: Pt educated on benefits and potential side effects of dry needling. Educated pt on benefits, precautions, side effects following TDN. Educated pt to use heat following treatment sessions if pt is experiencing pain or soreness. Pt verbalized good understanding of education.  Pt signed written consent to dry needling. Pt gave verbal consent for DN    Pt received dry needling to the below listed muscles using 30 and 60 mm needles.  R proximal piriformis  R glut med (mid point)    B UT  B LS  L C3 PVM  B suboccipitals (GV16, GB20)  B scalene   Winding performed every 5 minutes during treatment.      neuromuscular re-education activities to improve: Balance, Coordination, Proprioception, and Posture for 00 minutes. The following activities were included:  TrA 5" x 2 min  TrA 5"  20  Brace marching x 2min  None today.  Add next visit: prone I, T, Y for scapulothoracic stabilization, seated chin tuck with isometrics, TA activation series for lumbar stabilization      Moist heat to CSP concurrent with therex x 10 min  Modalities:   Chaya received training and instruction, as well as, monitoring of Iontophoresis patch with a 1.3 mL of Dexamethsone at a rate of 80 mA/min to the R SIJ. Safety and patient removal discussed.     Patient Education and Home Exercises     Home Exercises Provided and Patient Education Provided     Education provided:   - Therex rationale. Dry needling procedure and consent reviewed prior to treatment    Written Home Exercises Provided: yes. Exercises were reviewed and Chaya was able to demonstrate them prior to the end of the session.  Chaya demonstrated good  understanding of the education provided. See EMR under Patient Instructions for exercises provided during therapy sessions    ASSESSMENT     Pt presents " with slight innominate rotation which improved with MET. Good tolerance overall with appropriate training effect noted. Plan to progress global strength and stabilization next visit.  Chaya Is progressing well towards her goals.   Pt prognosis is Good.     Pt will continue to benefit from skilled outpatient physical therapy to address the deficits listed in the problem list box on initial evaluation, provide pt/family education and to maximize pt's level of independence in the home and community environment.     Pt's spiritual, cultural and educational needs considered and pt agreeable to plan of care and goals.     Anticipated barriers to physical therapy: standard, chronicity, occupation    Goals:   Short Term Goals (6 Weeks):   1. Pt will report 20% reduction in pain of the cervical spine and Lumbar spine for ease with ADL's. Progressing, not met 10/05/2022   2. Pt will demonstrate reduced self reported frequency and duration of headaches by 25%.Progressing, not met 10/05/2022   3. PT will demonstrate 1/3 MMT improvement in periscapular strength for ease with upright posture. Progressing, not met 10/05/2022   4. Pt will demonstrate improved cervical spine ROM in all directions by 5 degrees for ease with driving to MD appointments. Progressing, not met 10/05/2022   5. Pt to improve glute ad trunk strength by a half grade to allow increased ease with bending and lifting activities. Progressing, not met 10/05/2022      Long Term Goals (12 Weeks):   1. Pt will report being independent with HEP for maintenance of improvements gained during therapy sessions. Progressing, not met 10/05/2022   2. PT will report 50% reduction of pain of the neck and back for ease with dressing and grooming activities. Progressing, not met 10/05/2022   3. Pt will demonstrate full hip/LE and periscapular strength without the provocation of pain for ease with household chores. Progressing, not met 10/05/2022   4. Pt will demonstrate  appropriate upright posture without external cueing for ease with work related activities. Progressing, not met 10/05/2022   5. Pt will demonstrate reduced self reported frequency and duration of headaches by 75%. Progressing, not met 10/05/2022     PLAN   Plan of care Certification: 9/23/2022 to 12/23/2022.    Continue per POC with focus on improving cervical mobility, soft tissue mobility, and core and postural stability.     Niurka Gaitan, PT

## 2022-10-09 ENCOUNTER — HOSPITAL ENCOUNTER (OUTPATIENT)
Dept: RADIOLOGY | Facility: HOSPITAL | Age: 52
Discharge: HOME | End: 2022-10-09
Attending: NURSE PRACTITIONER
Payer: COMMERCIAL

## 2022-10-09 DIAGNOSIS — R74.8 ELEVATED LIVER ENZYMES: ICD-10-CM

## 2022-10-09 PROCEDURE — 76705 US ABDOMEN LIMITED: ICD-10-PCS | Mod: 26,,, | Performed by: RADIOLOGY

## 2022-10-09 PROCEDURE — 76705 ECHO EXAM OF ABDOMEN: CPT | Mod: 26,,, | Performed by: RADIOLOGY

## 2022-10-09 PROCEDURE — 76705 ECHO EXAM OF ABDOMEN: CPT | Mod: TC

## 2022-10-10 ENCOUNTER — CLINICAL SUPPORT (OUTPATIENT)
Dept: REHABILITATION | Facility: OTHER | Age: 52
End: 2022-10-10
Payer: COMMERCIAL

## 2022-10-10 DIAGNOSIS — M54.9 CHRONIC NECK AND BACK PAIN: Primary | ICD-10-CM

## 2022-10-10 DIAGNOSIS — M54.2 CHRONIC NECK AND BACK PAIN: Primary | ICD-10-CM

## 2022-10-10 DIAGNOSIS — G89.29 CHRONIC NECK AND BACK PAIN: Primary | ICD-10-CM

## 2022-10-10 DIAGNOSIS — G44.86 CERVICOGENIC HEADACHE: ICD-10-CM

## 2022-10-10 PROCEDURE — 97112 NEUROMUSCULAR REEDUCATION: CPT | Mod: PN

## 2022-10-10 PROCEDURE — 97110 THERAPEUTIC EXERCISES: CPT | Mod: PN

## 2022-10-10 PROCEDURE — 97140 MANUAL THERAPY 1/> REGIONS: CPT | Mod: PN

## 2022-10-10 NOTE — PROGRESS NOTES
"[facility]   Physical Therapy Treatment Note     Name: Chaya Escudero Luiz  Clinic Number: [id number]    Therapy Diagnosis:   Encounter Diagnoses   Name Primary?    Chronic neck and back pain Yes    Cervicogenic headache      Physician: Tess Haas FNP    Visit Date: 10/10/2022    Physician Orders: PT Eval and Treat   Medical Diagnosis from Referral:   M54.2 (ICD-10-CM) - Cervicalgia   G43.009 (ICD-10-CM) - Migraine without aura and without status migrainosus, not intractable      Evaluation Date: 9/23/2022  Authorization Period Expiration: 9/21/2023  Plan of Care Expiration: 12/23/2022  Progress Note Due: 10/23/2022  Visit # / Visits authorized: 5/ 21   FOTO: 1/3 on 9/23     Precautions: Standard       PTA Visit #: 0/5     Time In: 9:15  Time Out: 10:15  Total Billable Time: 45 minutes 1:1 (60 min total)    SUBJECTIVE     Pt reports: did a ton of walking over the weekend at [facility]. Noted increased tension over the low back and into the L neck over the weekend. "I think it was from the dry needling last visit."    She was compliant with home exercise program.  Response to previous treatment: relief for a few hours with dry needling and then return of symptoms  Functional change: no change    Pain: 7/10  Location: left head and neck, right low back/pelvis     OBJECTIVE     Objective Measures updated at progress report unless specified.     Treatment     Chaya received the treatments listed below:    Exercises in bold performed today:     therapeutic exercises to develop strength, endurance, ROM, flexibility, posture, and core stabilization for 25 minutes including:    +still point inducer 2 x 2'  +chin tucks over still point inducer x 15  +self STM into cervical rotation, flexion x 5 x 10" holds ea    Glute set into bridge 10 x 3"  SL clam x 20  SL reverse clams x 20  SL hip abd x 20    Not performed today:  MET to correct R innominate rotation 10 x 5", shotgun  Glute set " "10 x 10"  Supine chin tuck 10 x 10"  Self snag 5 x 5"  Headache snag 5 x 5"  Scap retractions 10 x 10"      manual therapy techniques: Joint mobilizations, Soft tissue Mobilization, and Dry Needling were applied to the: neck and low back for 27 minutes, including:    LSP: Manual MET to correct anterior innominate rotation (R), innominate ER, trunk rotation     CSP: C0-1 glides (seated and supine), C1-2 glides (supine), C2-7 DS/US, MWM C2-7 rotation/ flexion, MWM T1-4 RR/LR  Joint glides and HVLAT to CSP (no audible cavitation)    Application of TDN: Pt educated on benefits and potential side effects of dry needling. Educated pt on benefits, precautions, side effects following TDN. Educated pt to use heat following treatment sessions if pt is experiencing pain or soreness. Pt verbalized good understanding of education.  Pt signed written consent to dry needling. Pt gave verbal consent for DN    Pt received dry needling to the below listed muscles using 30 and 60 mm needles.  R proximal piriformis  R glut med (mid point)    B UT  B LS  L C3 PVM  B suboccipitals (GV16, GB20)  B scalene   Winding performed every 5 minutes during treatment.      neuromuscular re-education activities to improve: Balance, Coordination, Proprioception, and Posture for 8 minutes. The following activities were included:    +seated cervical isometric sidebending x 20 x 3" x YTB  +seated cervical isometric extension x 20 x 3" x YTB  TrA 5" x 2 min  TrA 5"  20  Brace marching x 2min  None today.  Add next visit: prone I, T, Y for scapulothoracic stabilization, TA activation series for lumbar stabilization      Moist heat to CSP concurrent with therex x 00 min  Modalities:   Chaya received training and instruction, as well as, monitoring of Iontophoresis patch with a 1.3 mL of Dexamethsone at a rate of 80 mA/min to the R SIJ. Safety and patient removal discussed.     Patient Education and Home Exercises     Home Exercises Provided and Patient " Education Provided     Education provided:   - Therex rationale. Dry needling procedure and consent reviewed prior to treatment    Written Home Exercises Provided: yes. Exercises were reviewed and Chaya was able to demonstrate them prior to the end of the session.  Chaya demonstrated good  understanding of the education provided. See EMR under Patient Instructions for exercises provided during therapy sessions    ASSESSMENT     Pt presents with C2-5 L articular pillar stiffness and TTP as well as decreased C0-1, C1-2 joint mobility. Good tolerance with manual therapy today, including MWM to promote flexibility, ROM, joint mobility. Initiated multidirectional cervical isometrics today to promote stabilization with appropriate training effect noted. Plan to initiate high velocity joint mobs to mid and upper CSP next visit. Defer MPT to LSP due to c/o increased stiffness/soreness after previous visit. Plan to progress strength and stabilization of trunk and hips next visit.    Chaya Is progressing well towards her goals.   Pt prognosis is Good.     Pt will continue to benefit from skilled outpatient physical therapy to address the deficits listed in the problem list box on initial evaluation, provide pt/family education and to maximize pt's level of independence in the home and community environment.     Pt's spiritual, cultural and educational needs considered and pt agreeable to plan of care and goals.     Anticipated barriers to physical therapy: standard, chronicity, occupation    Goals:   Short Term Goals (6 Weeks):   1. Pt will report 20% reduction in pain of the cervical spine and Lumbar spine for ease with ADL's. Progressing, not met 10/10/2022   2. Pt will demonstrate reduced self reported frequency and duration of headaches by 25%.Progressing, not met 10/10/2022   3. PT will demonstrate 1/3 MMT improvement in periscapular strength for ease with upright posture. Progressing, not met 10/10/2022   4.  Pt will demonstrate improved cervical spine ROM in all directions by 5 degrees for ease with driving to MD appointments. Progressing, not met 10/10/2022   5. Pt to improve glute ad trunk strength by a half grade to allow increased ease with bending and lifting activities. Progressing, not met 10/10/2022      Long Term Goals (12 Weeks):   1. Pt will report being independent with HEP for maintenance of improvements gained during therapy sessions. Progressing, not met 10/10/2022   2. PT will report 50% reduction of pain of the neck and back for ease with dressing and grooming activities. Progressing, not met 10/10/2022   3. Pt will demonstrate full hip/LE and periscapular strength without the provocation of pain for ease with household chores. Progressing, not met 10/10/2022   4. Pt will demonstrate appropriate upright posture without external cueing for ease with work related activities. Progressing, not met 10/10/2022   5. Pt will demonstrate reduced self reported frequency and duration of headaches by 75%. Progressing, not met 10/10/2022     PLAN   Plan of care Certification: 9/23/2022 to 12/23/2022.    Continue per POC with focus on improving cervical mobility, soft tissue mobility, and core and postural stability.     Niurka Gaitan, PT

## 2022-10-12 ENCOUNTER — CLINICAL SUPPORT (OUTPATIENT)
Dept: REHABILITATION | Facility: OTHER | Age: 52
End: 2022-10-12
Payer: COMMERCIAL

## 2022-10-12 DIAGNOSIS — G89.29 CHRONIC NECK AND BACK PAIN: Primary | ICD-10-CM

## 2022-10-12 DIAGNOSIS — G44.86 CERVICOGENIC HEADACHE: ICD-10-CM

## 2022-10-12 DIAGNOSIS — M54.9 CHRONIC NECK AND BACK PAIN: Primary | ICD-10-CM

## 2022-10-12 DIAGNOSIS — M54.2 CHRONIC NECK AND BACK PAIN: Primary | ICD-10-CM

## 2022-10-12 PROCEDURE — 97112 NEUROMUSCULAR REEDUCATION: CPT | Mod: PN

## 2022-10-12 PROCEDURE — 97110 THERAPEUTIC EXERCISES: CPT | Mod: PN

## 2022-10-12 NOTE — PROGRESS NOTES
"[facility]   Physical Therapy Treatment Note     Name: Chaya Dee  Clinic Number: [id number]    Therapy Diagnosis:   Encounter Diagnoses   Name Primary?    Chronic neck and back pain Yes    Cervicogenic headache      Physician: Tess Haas FNP    Visit Date: 10/12/2022    Physician Orders: PT Eval and Treat   Medical Diagnosis from Referral:   M54.2 (ICD-10-CM) - Cervicalgia   G43.009 (ICD-10-CM) - Migraine without aura and without status migrainosus, not intractable      Evaluation Date: 9/23/2022  Authorization Period Expiration: 9/21/2023  Plan of Care Expiration: 12/23/2022  Progress Note Due: 10/23/2022  Visit # / Visits authorized: 5/ 21   FOTO: 1/3 on 9/23     Precautions: Standard       PTA Visit #: 0/5     Time In: 9:15  Time Out: 10:15  Total Billable Time: 45 minutes 1:1 (60 min total)    SUBJECTIVE     Pt reports: really sore in the neck after the last visit. Was able to do more stretching with slight relief. Left base of skull and R lower back continue to feel achy.    She was compliant with home exercise program.  Response to previous treatment: relief for a few hours with dry needling and then return of symptoms  Functional change: no change    Pain: 7/10  Location: left head and neck, right low back/pelvis     OBJECTIVE     Objective Measures updated at progress report unless specified.     Treatment     Chaya received the treatments listed below:    Exercises in bold performed today:     therapeutic exercises to develop strength, endurance, ROM, flexibility, posture, and core stabilization for 30 minutes including:    still point inducer 1 x 3'  chin tucks over still point inducer x 20  +chin tuck over still point inducer with occular movement x 10 ea direction  +supine chin tuck with cervical rotation x 10 x OTB  +chin tuck with cervical flexion iso holds - 3 x 5" - poor tolerance, discontinued after several reps 2* pain    self STM into cervical " "rotation, flexion x 5 x 10" holds ea    Glute set into bridge 10 x 3"  SL clam x 20  SL reverse clams x 20  SL hip abd x 20    Not performed today:  MET to correct R innominate rotation 10 x 5", shotgun  Glute set 10 x 10"  Supine chin tuck 10 x 10"  Self snag 5 x 5"  Headache snag 5 x 5"  Scap retractions 10 x 10"      neuromuscular re-education activities to improve: Balance, Coordination, Proprioception, and Posture for 30 minutes. The following activities were included:    +PPT 10 x 10" - good tilt but pt notes pain with "release" of position  +TrA + chin tuck with pilates ring press x 2 min   +TrA + chin tuck with hip ADD ball squeeze x 10 breaths  +chin tuck with soft black ball squeeze x 10 breaths  +TA+ chin tuck w/ ball + 3 way clam x 15 ea x GTB  +TA+ chin tuck w/ ball + UE pull aparts x 15 ea x OTB    seated cervical isometric sidebending x 10 x 3" x YTB  seated cervical isometric extension x 10 x 3" x YTB    Brace marching x 2min  Add next visit: prone I, T, Y for scapulothoracic stabilization, TA activation series for lumbar stabilization      Moist heat to CSP concurrent with therex x 00 min  Modalities:   Chaya received training and instruction, as well as, monitoring of Iontophoresis patch with a 1.3 mL of Dexamethsone at a rate of 80 mA/min to the R SIJ. Safety and patient removal discussed.     Patient Education and Home Exercises     Home Exercises Provided and Patient Education Provided     Education provided:   - Therex rationale. Dry needling procedure and consent reviewed prior to treatment    Written Home Exercises Provided: yes. Exercises were reviewed and Chaya was able to demonstrate them prior to the end of the session.  Chaya demonstrated good  understanding of the education provided. See EMR under Patient Instructions for exercises provided during therapy sessions    ASSESSMENT     Defer manual therapy today was pt indicates recent dry needling and joint mobs have caused " intermittent flare ups of pain. Progression of therex program today with heavy emphasis on core and DNF stabilization for dynamic movement. Good TA but difficulty maintaining during LE dual tasks. Pt presents with good chin tuck but with poor ability to maintain during cervical flexion, with noted increased overuse of SCM and c/o increased pain at attachment at mastoid process. Modified chin tuck with use of soft stress ball under chin and VC to maintain tip of chin in contact with the ball to increased DNF vs SCM overuse and improving overall motor retraining. Good tolerance with remaining exercise program following modifications with c/o fatigue.    Chaya Is progressing well towards her goals.   Pt prognosis is Good.     Pt will continue to benefit from skilled outpatient physical therapy to address the deficits listed in the problem list box on initial evaluation, provide pt/family education and to maximize pt's level of independence in the home and community environment.     Pt's spiritual, cultural and educational needs considered and pt agreeable to plan of care and goals.     Anticipated barriers to physical therapy: standard, chronicity, occupation    Goals:   Short Term Goals (6 Weeks):   1. Pt will report 20% reduction in pain of the cervical spine and Lumbar spine for ease with ADL's. Progressing, not met 10/12/2022   2. Pt will demonstrate reduced self reported frequency and duration of headaches by 25%.Progressing, not met 10/12/2022   3. PT will demonstrate 1/3 MMT improvement in periscapular strength for ease with upright posture. Progressing, not met 10/12/2022   4. Pt will demonstrate improved cervical spine ROM in all directions by 5 degrees for ease with driving to MD appointments. Progressing, not met 10/12/2022   5. Pt to improve glute ad trunk strength by a half grade to allow increased ease with bending and lifting activities. Progressing, not met 10/12/2022      Long Term Goals (12  Weeks):   1. Pt will report being independent with HEP for maintenance of improvements gained during therapy sessions. Progressing, not met 10/12/2022   2. PT will report 50% reduction of pain of the neck and back for ease with dressing and grooming activities. Progressing, not met 10/12/2022   3. Pt will demonstrate full hip/LE and periscapular strength without the provocation of pain for ease with household chores. Progressing, not met 10/12/2022   4. Pt will demonstrate appropriate upright posture without external cueing for ease with work related activities. Progressing, not met 10/12/2022   5. Pt will demonstrate reduced self reported frequency and duration of headaches by 75%. Progressing, not met 10/12/2022     PLAN   Plan of care Certification: 9/23/2022 to 12/23/2022.    Continue per POC with focus on improving cervical mobility, soft tissue mobility, and core and postural stability.     Niurka Gaitan, PT

## 2022-10-19 ENCOUNTER — CLINICAL SUPPORT (OUTPATIENT)
Dept: REHABILITATION | Facility: OTHER | Age: 52
End: 2022-10-19
Payer: COMMERCIAL

## 2022-10-19 DIAGNOSIS — G89.29 CHRONIC NECK AND BACK PAIN: Primary | ICD-10-CM

## 2022-10-19 DIAGNOSIS — G44.86 CERVICOGENIC HEADACHE: ICD-10-CM

## 2022-10-19 DIAGNOSIS — M54.2 CHRONIC NECK AND BACK PAIN: Primary | ICD-10-CM

## 2022-10-19 DIAGNOSIS — M54.9 CHRONIC NECK AND BACK PAIN: Primary | ICD-10-CM

## 2022-10-19 PROCEDURE — 97140 MANUAL THERAPY 1/> REGIONS: CPT | Mod: PN | Performed by: PHYSICAL THERAPIST

## 2022-10-19 PROCEDURE — 97112 NEUROMUSCULAR REEDUCATION: CPT | Mod: PN | Performed by: PHYSICAL THERAPIST

## 2022-10-19 PROCEDURE — 97110 THERAPEUTIC EXERCISES: CPT | Mod: PN | Performed by: PHYSICAL THERAPIST

## 2022-10-19 NOTE — PROGRESS NOTES
"[facility]   Physical Therapy Treatment Note     Name: Chaya Escudero Luiz  Clinic Number: [id number]    Therapy Diagnosis:   Encounter Diagnoses   Name Primary?    Chronic neck and back pain Yes    Cervicogenic headache      Physician: Tess Haas FNP    Visit Date: 10/19/2022    Physician Orders: PT Eval and Treat   Medical Diagnosis from Referral:   M54.2 (ICD-10-CM) - Cervicalgia   G43.009 (ICD-10-CM) - Migraine without aura and without status migrainosus, not intractable      Evaluation Date: 9/23/2022  Authorization Period Expiration: 12/31/2022  Plan of Care Expiration: 12/23/2022  Progress Note Due: 10/23/2022  Visit # / Visits authorized: 8/ 21   FOTO: 1/3 on 9/23     Precautions: Standard       PTA Visit #: 0/5     Time In: 0915  Time Out: 1000  Total Billable Time: 45 minutes 1:1     SUBJECTIVE     Pt reports: she's feeling really sore after standing for 3+ hours at a concert last night.     She was compliant with home exercise program.  Response to previous treatment: relief for a few hours with dry needling and then return of symptoms  Functional change: no change    Pain: 7/10  Location: left head and neck, right low back/pelvis     OBJECTIVE     Objective Measures updated at progress report unless specified.     Treatment     Chaya received the treatments listed below:    Exercises in bold performed today:     therapeutic exercises to develop strength, endurance, ROM, flexibility, posture, and core stabilization for 10 minutes including:    still point inducer 1 x 3'  chin tucks over still point inducer x 20  chin tuck over still point inducer with occular movement x 10 ea direction  supine chin tuck with cervical rotation x 10 x OTB  chin tuck with cervical flexion iso holds - 3 x 5" - poor tolerance, discontinued after several reps 2* pain    self STM into cervical rotation, flexion x 5 x 10" holds ea    Glute set into bridge 10 x 3"  SL clam x 20  SL reverse " "clams x 20  SL hip abd x 20        Glute set 10 x 10"  Supine chin tuck 10 x 10"  Self snag 5 x 5"  Headache snag 5 x 5"  Scap retractions 10 x 10"      neuromuscular re-education activities to improve: Balance, Coordination, Proprioception, and Posture for 20 minutes. The following activities were included:    PPT 10 x 10" - good tilt but pt notes pain with "release" of position  TrA + chin tuck with pilates ring press x 2 min   TrA + chin tuck with hip ADD ball squeeze x 10 breaths  chin tuck with soft black ball squeeze x 10 breaths  TA+ chin tuck w/ ball + 3 way clam x 15 ea x GTB  TA+ chin tuck w/ ball + UE pull aparts x 15 ea x OTB    seated cervical isometric sidebending x 10 x 3" x YTB  seated cervical isometric extension x 10 x 3" x YTB    Brace marching x 2min  Add next visit: prone I, T, Y for scapulothoracic stabilization, TA activation series for lumbar stabilization      Moist heat to CSP concurrent with therex x 00 min  Modalities:   Chaya received training and instruction, as well as, monitoring of Iontophoresis patch with a 1.3 mL of Dexamethsone at a rate of 80 mA/min to the R SIJ. Safety and patient removal discussed.     Manual therapy x 15 min  MET to correct R innominate rotation 10 x 5", shotgun  Preparation and application of Ktape. Star decompression to R SIJ. Y strip to CSP.    Patient Education and Home Exercises     Home Exercises Provided and Patient Education Provided     Education provided:   - Therex rationale. Dry needling procedure and consent reviewed prior to treatment  - 10/19/2022 Patient received education regarding appropriate care and removal of Kinesiotape. Patient instructed in proper removal techniques if skin irritation occurs.    Written Home Exercises Provided: yes. Exercises were reviewed and Chaya was able to demonstrate them prior to the end of the session.  Chaya demonstrated good  understanding of the education provided. See EMR under Patient " Instructions for exercises provided during therapy sessions    ASSESSMENT     Good tolerance to treatment today. Innominate rotation noted today, corrects with MET. Increased tenderness noted to R PSIS. Trial of ktape for pain reduction to R low back and to CSP.     Chaya Is progressing well towards her goals.   Pt prognosis is Good.     Pt will continue to benefit from skilled outpatient physical therapy to address the deficits listed in the problem list box on initial evaluation, provide pt/family education and to maximize pt's level of independence in the home and community environment.     Pt's spiritual, cultural and educational needs considered and pt agreeable to plan of care and goals.     Anticipated barriers to physical therapy: standard, chronicity, occupation    Goals:   Short Term Goals (6 Weeks):   1. Pt will report 20% reduction in pain of the cervical spine and Lumbar spine for ease with ADL's. Progressing, not met 10/19/2022   2. Pt will demonstrate reduced self reported frequency and duration of headaches by 25%.Progressing, not met 10/19/2022   3. PT will demonstrate 1/3 MMT improvement in periscapular strength for ease with upright posture. Progressing, not met 10/19/2022   4. Pt will demonstrate improved cervical spine ROM in all directions by 5 degrees for ease with driving to MD appointments. Progressing, not met 10/19/2022   5. Pt to improve glute ad trunk strength by a half grade to allow increased ease with bending and lifting activities. Progressing, not met 10/19/2022      Long Term Goals (12 Weeks):   1. Pt will report being independent with HEP for maintenance of improvements gained during therapy sessions. Progressing, not met 10/19/2022   2. PT will report 50% reduction of pain of the neck and back for ease with dressing and grooming activities. Progressing, not met 10/19/2022   3. Pt will demonstrate full hip/LE and periscapular strength without the provocation of pain for ease  with household chores. Progressing, not met 10/19/2022   4. Pt will demonstrate appropriate upright posture without external cueing for ease with work related activities. Progressing, not met 10/19/2022   5. Pt will demonstrate reduced self reported frequency and duration of headaches by 75%. Progressing, not met 10/19/2022     PLAN   Plan of care Certification: 9/23/2022 to 12/23/2022.    Continue per POC with focus on improving cervical mobility, soft tissue mobility, and core and postural stability.     Tia Fontanez, PT

## 2022-10-20 ENCOUNTER — CLINICAL SUPPORT (OUTPATIENT)
Dept: REHABILITATION | Facility: OTHER | Age: 52
End: 2022-10-20
Payer: COMMERCIAL

## 2022-10-20 DIAGNOSIS — M54.2 CHRONIC NECK AND BACK PAIN: Primary | ICD-10-CM

## 2022-10-20 DIAGNOSIS — G44.86 CERVICOGENIC HEADACHE: ICD-10-CM

## 2022-10-20 DIAGNOSIS — M54.9 CHRONIC NECK AND BACK PAIN: Primary | ICD-10-CM

## 2022-10-20 DIAGNOSIS — G89.29 CHRONIC NECK AND BACK PAIN: Primary | ICD-10-CM

## 2022-10-20 PROCEDURE — 97112 NEUROMUSCULAR REEDUCATION: CPT | Mod: PN | Performed by: PHYSICAL THERAPIST

## 2022-10-20 PROCEDURE — 97110 THERAPEUTIC EXERCISES: CPT | Mod: PN | Performed by: PHYSICAL THERAPIST

## 2022-10-20 NOTE — PROGRESS NOTES
"[facility]   Physical Therapy Treatment Note     Name: Chaya Escudero Luiz  Clinic Number: [id number]    Therapy Diagnosis:   Encounter Diagnoses   Name Primary?    Chronic neck and back pain Yes    Cervicogenic headache      Physician: Tess Haas FNP    Visit Date: 10/20/2022    Physician Orders: PT Eval and Treat   Medical Diagnosis from Referral:   M54.2 (ICD-10-CM) - Cervicalgia   G43.009 (ICD-10-CM) - Migraine without aura and without status migrainosus, not intractable      Evaluation Date: 9/23/2022  Authorization Period Expiration: 12/31/2022  Plan of Care Expiration: 12/23/2022  Progress Note Due: 10/23/2022  Visit # / Visits authorized: 9/ 21   FOTO: 1/3 on 9/23     Precautions: Standard       PTA Visit #: 0/5     Time In: 1500  Time Out: 1545  Total Billable Time: 45 minutes 1:1     SUBJECTIVE     Pt reports: feeling better today than last visit, but continues with tightness to neck and R low back.      She was compliant with home exercise program.  Response to previous treatment: relief for a few hours with dry needling and then return of symptoms  Functional change: no change    Pain: 6/10  Location: left head and neck, right low back/pelvis     OBJECTIVE     Objective Measures updated at progress report unless specified.     Treatment     Chaya received the treatments listed below:    Exercises in bold performed today:     therapeutic exercises to develop strength, endurance, ROM, flexibility, posture, and core stabilization for 10 minutes including:    still point inducer 1 x 3'  chin tucks over still point inducer x 20  chin tuck over still point inducer with occular movement x 10 ea direction  supine chin tuck with cervical rotation x 10 x OTB  chin tuck with cervical flexion iso holds - 3 x 5" - poor tolerance, discontinued after several reps 2* pain    self STM into cervical rotation, flexion x 5 x 10" holds ea    Glute set into bridge 10 x 3"  SL clam x " "20  SL reverse clams x 20  SL hip abd x 20        Glute set 10 x 10"  Supine chin tuck 10 x 10"  Self snag 5 x 5"  Headache snag 5 x 5"  Scap retractions 10 x 10"      neuromuscular re-education activities to improve: Balance, Coordination, Proprioception, and Posture for 35 minutes. The following activities were included:    PPT 10 x 10"   TrA + chin tuck with pilates ring press x 2 min   TrA + chin tuck with hip ADD ball squeeze x 2 min  chin tuck with soft black ball squeeze x 10 breaths  TA+ chin tuck w/ ball + 3 way clam x 15 ea x GTB  TA+ chin tuck w/ ball + UE pull aparts x 15 ea x OTB  Brace marching x 2min  seated cervical isometric sidebending x 10 x 3" x YTB  seated cervical isometric extension x 10 x 3" x YTB      Add next visit: prone I, T, Y for scapulothoracic stabilization, TA activation series for lumbar stabilization      Moist heat to CSP concurrent with therex x 00 min  Modalities:   Chaya received training and instruction, as well as, monitoring of Iontophoresis patch with a 1.3 mL of Dexamethsone at a rate of 80 mA/min to the R SIJ. Safety and patient removal discussed.     Manual therapy x 00 min  MET to correct R innominate rotation 10 x 5", shotgun  Preparation and application of Ktape. Star decompression to R SIJ. Y strip to CSP.    Patient Education and Home Exercises     Home Exercises Provided and Patient Education Provided     Education provided:   - Therex rationale. Dry needling procedure and consent reviewed prior to treatment  - 10/19/2022 Patient received education regarding appropriate care and removal of Kinesiotape. Patient instructed in proper removal techniques if skin irritation occurs.    Written Home Exercises Provided: yes. Exercises were reviewed and Chaya was able to demonstrate them prior to the end of the session.  Chaya demonstrated good  understanding of the education provided. See EMR under Patient Instructions for exercises provided during therapy " sessions    ASSESSMENT     Overall good tolerance to treatment today. Taping held as pt with min redness noted from removal at start of session. Verbal cuing for technique with core stabilization exercises today with good training effect noted. Pt may benefit from future referral to Healthy Back/Neck program to promote further strength and stability.     Chaya Is progressing well towards her goals.   Pt prognosis is Good.     Pt will continue to benefit from skilled outpatient physical therapy to address the deficits listed in the problem list box on initial evaluation, provide pt/family education and to maximize pt's level of independence in the home and community environment.     Pt's spiritual, cultural and educational needs considered and pt agreeable to plan of care and goals.     Anticipated barriers to physical therapy: standard, chronicity, occupation    Goals:   Short Term Goals (6 Weeks):   1. Pt will report 20% reduction in pain of the cervical spine and Lumbar spine for ease with ADL's. Progressing, not met 10/20/2022   2. Pt will demonstrate reduced self reported frequency and duration of headaches by 25%.Progressing, not met 10/20/2022   3. PT will demonstrate 1/3 MMT improvement in periscapular strength for ease with upright posture. Progressing, not met 10/20/2022   4. Pt will demonstrate improved cervical spine ROM in all directions by 5 degrees for ease with driving to MD appointments. Progressing, not met 10/20/2022   5. Pt to improve glute ad trunk strength by a half grade to allow increased ease with bending and lifting activities. Progressing, not met 10/20/2022      Long Term Goals (12 Weeks):   1. Pt will report being independent with HEP for maintenance of improvements gained during therapy sessions. Progressing, not met 10/20/2022   2. PT will report 50% reduction of pain of the neck and back for ease with dressing and grooming activities. Progressing, not met 10/20/2022   3. Pt will  demonstrate full hip/LE and periscapular strength without the provocation of pain for ease with household chores. Progressing, not met 10/20/2022   4. Pt will demonstrate appropriate upright posture without external cueing for ease with work related activities. Progressing, not met 10/20/2022   5. Pt will demonstrate reduced self reported frequency and duration of headaches by 75%. Progressing, not met 10/20/2022     PLAN   Plan of care Certification: 9/23/2022 to 12/23/2022.    Continue per POC with focus on improving cervical mobility, soft tissue mobility, and core and postural stability.     Tia Fontanez, PT

## 2022-10-24 ENCOUNTER — PATIENT MESSAGE (OUTPATIENT)
Dept: NEUROLOGY | Facility: CLINIC | Age: 52
End: 2022-10-24
Payer: COMMERCIAL

## 2022-10-24 ENCOUNTER — CLINICAL SUPPORT (OUTPATIENT)
Dept: REHABILITATION | Facility: OTHER | Age: 52
End: 2022-10-24
Payer: COMMERCIAL

## 2022-10-24 DIAGNOSIS — M54.9 CHRONIC NECK AND BACK PAIN: Primary | ICD-10-CM

## 2022-10-24 DIAGNOSIS — G44.86 CERVICOGENIC HEADACHE: ICD-10-CM

## 2022-10-24 DIAGNOSIS — M54.2 CHRONIC NECK AND BACK PAIN: Primary | ICD-10-CM

## 2022-10-24 DIAGNOSIS — G89.29 CHRONIC NECK AND BACK PAIN: Primary | ICD-10-CM

## 2022-10-24 PROCEDURE — 97112 NEUROMUSCULAR REEDUCATION: CPT | Mod: PN

## 2022-10-24 NOTE — PROGRESS NOTES
"[facility]   Physical Therapy Treatment Note     Name: Chaya Escudero Luiz  Clinic Number: [id number]    Therapy Diagnosis:   Encounter Diagnoses   Name Primary?    Chronic neck and back pain Yes    Cervicogenic headache      Physician: Tess Haas FNP    Visit Date: 10/24/2022    Physician Orders: PT Eval and Treat   Medical Diagnosis from Referral:   M54.2 (ICD-10-CM) - Cervicalgia   G43.009 (ICD-10-CM) - Migraine without aura and without status migrainosus, not intractable      Evaluation Date: 9/23/2022  Authorization Period Expiration: 12/31/2022  Plan of Care Expiration: 12/23/2022  Progress Note Due: 10/23/2022  Visit # / Visits authorized: 9/ 21   FOTO: 1/3 on 9/23     Precautions: Standard       PTA Visit #: 0/5     Time In: 8:30  Time Out: 9:20  Total Billable Time: 50 minutes 1:1     SUBJECTIVE     Pt reports: Continued c/c is L neck pain and R low back pain. Pt reports frustration with min improvement and continued pain. Is looking into getting an Xray and possibly a referral to an orthopedist. Turning head to the left. R low back feels like a catch.     She was compliant with home exercise program.  Response to previous treatment: relief for a few hours with dry needling and then return of symptoms  Functional change: no change    Pain: 6/10  Location: left head and neck, right low back/pelvis     OBJECTIVE     Objective Measures updated at progress report unless specified.     Treatment     Chaya received the treatments listed below:    Exercises in bold performed today:     therapeutic exercises to develop strength, endurance, ROM, flexibility, posture, and core stabilization for 7 minutes including:    still point inducer 1 x 3'  chin tucks over still point inducer x 20  chin tuck over still point inducer with occular movement x 10 ea direction  supine chin tuck with cervical rotation x 10 x OTB  chin tuck with cervical flexion iso holds - 3 x 5" - poor " "tolerance, discontinued after several reps 2* pain    self STM into cervical rotation, flexion x 5 x 10" holds ea    Glute set into bridge 10 x 3"  SL clam x 20  SL reverse clams x 20  SL hip abd x 20        Glute set 10 x 10"  Supine chin tuck 10 x 10"  Self snag 5 x 5"  Headache snag 5 x 5"  Scap retractions 10 x 10"      neuromuscular re-education activities to improve: Balance, Coordination, Proprioception, and Posture for 38 minutes. The following activities were included:    +Qped TA + chin tuck 10 x 5" holds w/o foam, 10x with 1/2 foam for TC  +Qped TA + chin tuck with head rotations x 10 - notes pain with turning head L  +Qped TA + chin tuck with alt arm lifts x 10  +Qped, TA+chin tuck with alt hip ext x 10  +Qped, TA+chin tuck with alt donkey kick x 10    PPT 10 x 10"   TrA + chin tuck with pilates ring press x 2 min   TrA + chin tuck with hip ADD ball squeeze x 2 min  chin tuck with soft black ball squeeze x 10 breaths  TA+ chin tuck w/ ball + 3 way clam x 15 ea x GTB  TA+ chin tuck w/ ball + UE pull aparts x 15 ea x OTB  Brace marching x 2min  seated cervical isometric sidebending 3x 10 x 3" x YTB  seated cervical isometric extension 3x 10 x 3" x YTB    +1/2 foam against wall + chin tuck: standing I, T, Y x 15 ea x 1# DB    Add next visit: prone I, T, Y for scapulothoracic stabilization, TA activation series for lumbar stabilization      dynamic functional therapeutic activities to improve functional performance for 5 minutes. Including:   +reverse hyperextensions x 15 - modified ROM to limit c/o R SIJ pain      Patient Education and Home Exercises     Home Exercises Provided and Patient Education Provided     Education provided:   - Therex rationale. Dry needling procedure and consent reviewed prior to treatment  - 10/19/2022 Patient received education regarding appropriate care and removal of Kinesiotape. Patient instructed in proper removal techniques if skin irritation occurs.    Written Home " Exercises Provided: yes. Exercises were reviewed and Chaya was able to demonstrate them prior to the end of the session.  Chaya demonstrated good  understanding of the education provided. See EMR under Patient Instructions for exercises provided during therapy sessions    ASSESSMENT     Overall good tolerance to treatment today. Taping held as pt with min redness noted from removal at start of session. Verbal cuing for technique with core stabilization exercises today with good training effect noted. Pt may benefit from future referral to [facility] program to promote further strength and stability.     Chaya Is progressing well towards her goals.   Pt prognosis is Good.     Pt will continue to benefit from skilled outpatient physical therapy to address the deficits listed in the problem list box on initial evaluation, provide pt/family education and to maximize pt's level of independence in the home and community environment.     Pt's spiritual, cultural and educational needs considered and pt agreeable to plan of care and goals.     Anticipated barriers to physical therapy: standard, chronicity, occupation    Goals:   Short Term Goals (6 Weeks):   1. Pt will report 20% reduction in pain of the cervical spine and Lumbar spine for ease with ADL's. Progressing, not met 10/24/2022   2. Pt will demonstrate reduced self reported frequency and duration of headaches by 25%.Progressing, not met 10/24/2022   3. PT will demonstrate 1/3 MMT improvement in periscapular strength for ease with upright posture. Progressing, not met 10/24/2022   4. Pt will demonstrate improved cervical spine ROM in all directions by 5 degrees for ease with driving to MD appointments. Progressing, not met 10/24/2022   5. Pt to improve glute ad trunk strength by a half grade to allow increased ease with bending and lifting activities. Progressing, not met 10/24/2022      Long Term Goals (12 Weeks):   1. Pt will report being  independent with HEP for maintenance of improvements gained during therapy sessions. Progressing, not met 10/24/2022   2. PT will report 50% reduction of pain of the neck and back for ease with dressing and grooming activities. Progressing, not met 10/24/2022   3. Pt will demonstrate full hip/LE and periscapular strength without the provocation of pain for ease with household chores. Progressing, not met 10/24/2022   4. Pt will demonstrate appropriate upright posture without external cueing for ease with work related activities. Progressing, not met 10/24/2022   5. Pt will demonstrate reduced self reported frequency and duration of headaches by 75%. Progressing, not met 10/24/2022     PLAN   Plan of care Certification: 9/23/2022 to 12/23/2022.    Continue per POC with focus on improving cervical mobility, soft tissue mobility, and core and postural stability.     Niurka Gaitan, PT

## 2022-10-26 ENCOUNTER — CLINICAL SUPPORT (OUTPATIENT)
Dept: REHABILITATION | Facility: OTHER | Age: 52
End: 2022-10-26
Payer: COMMERCIAL

## 2022-10-26 DIAGNOSIS — M54.2 CHRONIC NECK AND BACK PAIN: Primary | ICD-10-CM

## 2022-10-26 DIAGNOSIS — G89.29 CHRONIC NECK AND BACK PAIN: Primary | ICD-10-CM

## 2022-10-26 DIAGNOSIS — G44.86 CERVICOGENIC HEADACHE: ICD-10-CM

## 2022-10-26 DIAGNOSIS — M54.9 CHRONIC NECK AND BACK PAIN: Primary | ICD-10-CM

## 2022-10-26 PROCEDURE — 97112 NEUROMUSCULAR REEDUCATION: CPT | Mod: PN

## 2022-10-26 PROCEDURE — 97110 THERAPEUTIC EXERCISES: CPT | Mod: PN

## 2022-10-26 PROCEDURE — 97140 MANUAL THERAPY 1/> REGIONS: CPT | Mod: PN

## 2022-10-26 NOTE — PROGRESS NOTES
"[facility]   Physical Therapy Treatment Note     Name: Chaya Dee  Clinic Number: [id number]    Therapy Diagnosis:   Encounter Diagnoses   Name Primary?    Chronic neck and back pain Yes    Cervicogenic headache      Physician: Tess Haas FNP    Visit Date: 10/26/2022    Physician Orders: PT Eval and Treat   Medical Diagnosis from Referral:   M54.2 (ICD-10-CM) - Cervicalgia   G43.009 (ICD-10-CM) - Migraine without aura and without status migrainosus, not intractable      Evaluation Date: 9/23/2022  Authorization Period Expiration: 12/31/2022  Plan of Care Expiration: 12/23/2022  Progress Note Due: 10/23/2022  Visit # / Visits authorized: 11/ 21   FOTO: 1/3 on 9/23     Precautions: Standard       PTA Visit #: 0/5     Time In: 8:30  Time Out: 9:20  Total Billable Time: 50 minutes 1:1     SUBJECTIVE     Pt reports: "my neck does not feel great today. The left side continues to feel very tight."    She was compliant with home exercise program.  Response to previous treatment: relief for a few hours with dry needling and then return of symptoms  Functional change: no change    Pain: 6/10  Location: left head and neck, right low back/pelvis     OBJECTIVE     Objective Measures updated at progress report unless specified.     Updated 10/26/2022    Upper Quarter Screen     Resting posture: normalized positioning of the scapula, but increased FHP with increased subcranial backward bending  Palpation: increased tone/tenderness to Left SCM insertion on mastoid and length of mm belly, suboccipitals adjacent to SCM insertion, Left scalenes  Joint mobs: mod dickens SCS, first rib (L), T1-4 PAs and RR/LR glides       CERVICAL SPINE AROM:   Flexion: Mod dickens, chin 1" to chest, tension in posterior neck and head - no change   Extension: WFL, stiffness    Left Sidebend: WFL, pinch in L neck   Right Sidebend: WFL, tension over L neck   Left Rotation: 55 deg, sharp L neck pain   Right Rotation: " "55 deg, tension noted over L neck      Deep Neck Flexor: fair but poor stabilization during dual tasks with CSP or UE  Shoulder ROM: WNL all directions, winging noted with IR        UPPER EXTREMITY STRENGTH:    Left Right   Shoulder External Rotation 4+/5 - winging noted 4+/5 - winging noted   Upper trap 5/5 5/5   Mid trap Fair- Fair-   Lower Trap poor poor   Serratus anterior Fair- Fair-      Flexibility:   Pectoralis Major / Minor: hypo  Latissimus Dorsi: good  Subscapularis: good  Upper Traps: WFL  Levator Scap: WFL  Scalenes: poor     Special Tests:    Clonus: -  Vertebral artery test: -  Alar ligament integrity test: -  Neural Tension: -       Treatment     Chaya received the treatments listed below:    Exercises in bold performed today:     therapeutic exercises to develop strength, endurance, ROM, flexibility, posture, and core stabilization for 18 minutes including:    Reassessment x 10'  +self first rib inf glide with strap x 10 deep breaths  +self scalene stretch x 10 breaths in 3 positions (fwd, nose down, nose up)  still point inducer 1 x 3'  chin tucks over still point inducer x 20  chin tuck over still point inducer with occular movement x 10 ea direction  supine chin tuck with cervical rotation x 10 x OTB  chin tuck with cervical flexion iso holds - 3 x 5" - poor tolerance, discontinued after several reps 2* pain    self STM into cervical rotation, flexion x 5 x 10" holds ea    Glute set into bridge 10 x 3"  SL clam x 20  SL reverse clams x 20  SL hip abd x 20        Glute set 10 x 10"  Supine chin tuck 10 x 10"  Self snag 5 x 5"  Headache snag 5 x 5"  Scap retractions 10 x 10"      neuromuscular re-education activities to improve: Balance, Coordination, Proprioception, and Posture for 24 minutes. The following activities were included:    Qped TA + chin tuck 10x10" holds with 1/2 foam for TC  Qped TA + chin tuck with head rotations x 10 - changed to side bending 2* pain with rotation  Qped TA + " "chin tuck with alt arm lifts x 10  Qped, TA+chin tuck with alt hip ext x 10  Qped, TA+chin tuck with alt donkey kick x 10    PPT 10 x 10"   TrA + chin tuck with pilates ring press x 2 min   TrA + chin tuck with hip ADD ball squeeze x 2 min  chin tuck with soft black ball squeeze x 10 breaths  TA+ chin tuck w/ ball + 3 way clam x 15 ea x GTB  TA+ chin tuck w/ ball + UE pull aparts x 15 ea x OTB  Brace marching x 2min  seated cervical isometric sidebending 2x 10 x 3" x OTB  seated cervical isometric extension 2x 10 x 3" x OTB    1/2 foam against wall + chin tuck: standing I, T, Y x 15 ea x 2# DB    Add next visit: prone I, T, Y for scapulothoracic stabilization, TA activation series for lumbar stabilization      Manual therapy: 8 min x vacuum/cupping STM with manual therapy techniques was performed to L neck to decrease muscle tightness, increase circulation and promote healing process. The pt's skin was monitored for redness adjusting pressure as needed. The pt was instructed in possible side effects of bruising and/or soreness. Pt verbalized understanding.      dynamic functional therapeutic activities to improve functional performance for 00 minutes. Including:   reverse hyperextensions x 15 - modified ROM to limit c/o R SIJ pain      Patient Education and Home Exercises     Home Exercises Provided and Patient Education Provided     Education provided:   - Therex rationale. Dry needling procedure and consent reviewed prior to treatment  - 10/19/2022 Patient received education regarding appropriate care and removal of Kinesiotape. Patient instructed in proper removal techniques if skin irritation occurs.    Written Home Exercises Provided: yes. Exercises were reviewed and Chaya was able to demonstrate them prior to the end of the session.  Chaya demonstrated good  understanding of the education provided. See EMR under Patient Instructions for exercises provided during therapy sessions    ASSESSMENT "     Reassessment performed today as pt presents with continued c/o LBP and neck pain. Pt presents with continued joint mobility and scalene flexibility limitations in the cervical spine, with poor deep neck flexor strength and limited stability as seen by an inability to maintain craniocervical flexion during cervical flexion against gravity w/o increased pain and compensatory movement. Fair improvement with use of TC using soft handheld stress ball at chin. Added scalene stretch with first rib mob today. Pt notes good improvement in pain modulation following stretch.    As pt continues to present with min change in neck and back pain, consider referral to [facility] in the future for more aggressive strengthening, stabilization, and management of a chronic condition.    Chaya Is progressing well towards her goals.   Pt prognosis is Good.     Pt will continue to benefit from skilled outpatient physical therapy to address the deficits listed in the problem list box on initial evaluation, provide pt/family education and to maximize pt's level of independence in the home and community environment.     Pt's spiritual, cultural and educational needs considered and pt agreeable to plan of care and goals.     Anticipated barriers to physical therapy: standard, chronicity, occupation    Goals:   Short Term Goals (6 Weeks):   1. Pt will report 20% reduction in pain of the cervical spine and Lumbar spine for ease with ADL's. Progressing, not met 10/26/2022   2. Pt will demonstrate reduced self reported frequency and duration of headaches by 25%.Progressing, not met 10/26/2022   3. PT will demonstrate 1/3 MMT improvement in periscapular strength for ease with upright posture. Progressing, not met 10/26/2022   4. Pt will demonstrate improved cervical spine ROM in all directions by 5 degrees for ease with driving to MD appointments. Progressing, not met 10/26/2022   5. Pt to improve glute ad trunk strength by a  half grade to allow increased ease with bending and lifting activities. Progressing, not met 10/26/2022      Long Term Goals (12 Weeks):   1. Pt will report being independent with HEP for maintenance of improvements gained during therapy sessions. Progressing, not met 10/26/2022   2. PT will report 50% reduction of pain of the neck and back for ease with dressing and grooming activities. Progressing, not met 10/26/2022   3. Pt will demonstrate full hip/LE and periscapular strength without the provocation of pain for ease with household chores. Progressing, not met 10/26/2022   4. Pt will demonstrate appropriate upright posture without external cueing for ease with work related activities. Progressing, not met 10/26/2022   5. Pt will demonstrate reduced self reported frequency and duration of headaches by 75%. Progressing, not met 10/26/2022     PLAN   Plan of care Certification: 9/23/2022 to 12/23/2022.    Continue per POC with focus on improving cervical mobility, soft tissue mobility, and core and postural stability.   Pt may benefit from future referral to [facility] program to promote further strength and stability.     Niurka Gaitan, PT

## 2022-10-31 ENCOUNTER — CLINICAL SUPPORT (OUTPATIENT)
Dept: REHABILITATION | Facility: OTHER | Age: 52
End: 2022-10-31
Payer: COMMERCIAL

## 2022-10-31 DIAGNOSIS — M54.9 CHRONIC NECK AND BACK PAIN: Primary | ICD-10-CM

## 2022-10-31 DIAGNOSIS — M54.2 CHRONIC NECK AND BACK PAIN: Primary | ICD-10-CM

## 2022-10-31 DIAGNOSIS — G44.86 CERVICOGENIC HEADACHE: ICD-10-CM

## 2022-10-31 DIAGNOSIS — G89.29 CHRONIC NECK AND BACK PAIN: Primary | ICD-10-CM

## 2022-10-31 PROCEDURE — 97112 NEUROMUSCULAR REEDUCATION: CPT | Mod: PN

## 2022-10-31 PROCEDURE — 97110 THERAPEUTIC EXERCISES: CPT | Mod: PN

## 2022-10-31 NOTE — PROGRESS NOTES
[facility]   Physical Therapy Treatment Note     Name: Chaya Escudero Luiz  Clinic Number: [id number]    Therapy Diagnosis:   Encounter Diagnoses   Name Primary?    Chronic neck and back pain Yes    Cervicogenic headache      Physician: Tess Haas FNP    Visit Date: 10/31/2022    Physician Orders: PT Eval and Treat   Medical Diagnosis from Referral:   M54.2 (ICD-10-CM) - Cervicalgia   G43.009 (ICD-10-CM) - Migraine without aura and without status migrainosus, not intractable      Evaluation Date: 9/23/2022  Authorization Period Expiration: 12/31/2022  Plan of Care Expiration: 12/23/2022  Progress Note Due: 10/23/2022  Visit # / Visits authorized: 11/ 21   FOTO: 1/3 on 9/23     Precautions: Standard       PTA Visit #: 0/5     Time In: 8:30  Time Out: 9:20  Total Billable Time: 50 minutes 1:1     SUBJECTIVE     Pt reports: Had an intense headache on Friday (notes 8/10) but denies HA's over the weekend. Likes the new stretch [for scalenes] and uses it often at work.    She was compliant with home exercise program.  Response to previous treatment: relief for a few hours with dry needling and then return of symptoms  Functional change: no change    Pain: 6/10  Location: left head and neck, right low back/pelvis     OBJECTIVE     Objective Measures updated at progress report unless specified.     Updated 10/26/2022      Treatment     Chaya received the treatments listed below:    Exercises in bold performed today:     therapeutic exercises to develop strength, endurance, ROM, flexibility, posture, and core stabilization for 40 minutes including:    self first rib inf glide with strap x 10 deep breaths  self scalene stretch x 10 breaths in 3 positions (fwd, nose down, nose up)  still point inducer 1 x 3'  chin tucks over still point inducer x 20  +over still point inducer   Jovan ER x 20 x OTB   Jovan Abd x 20 x OTB    +standing 3-way hip x 15 x OTB  +side stepping 2 x 30 ft x OTB at  "ankles  +anti rotations 1 x 15 x BTB  +SLS with unilat row 1 x 15 x BTB    neuromuscular re-education activities to improve: Balance, Coordination, Proprioception, and Posture for 10 minutes. The following activities were included:    Qped TA + chin tuck 10x10" holds with 1/2 foam for TC  Qped TA + chin tuck with head rotations x 10 - changed to side bending 2* pain with rotation  Qped TA + chin tuck with alt arm lifts x 10  Qped, TA+chin tuck with alt hip ext x 10  Qped, TA+chin tuck with alt donkey kick x 10    PPT 10 x 10"   TrA + chin tuck with pilates ring press x 2 min   TrA + chin tuck with hip ADD ball squeeze x 2 min  chin tuck with soft black ball squeeze x 10 breaths  TA+ chin tuck w/ ball + 3 way clam x 15 ea x GTB  TA+ chin tuck w/ ball + UE pull aparts x 15 ea x OTB  Brace marching x 2min  seated cervical isometric sidebending 3x 10 x 3" x OTB  seated cervical isometric extension 2x 10 x 3" x OTB    1/2 foam against wall + chin tuck: standing I, T, Y x 15 ea x 2# DB    Add next visit: prone I, T, Y for scapulothoracic stabilization, TA activation series for lumbar stabilization      Manual therapy: 8 min x vacuum/cupping STM with manual therapy techniques was performed to L neck to decrease muscle tightness, increase circulation and promote healing process. The pt's skin was monitored for redness adjusting pressure as needed. The pt was instructed in possible side effects of bruising and/or soreness. Pt verbalized understanding.      dynamic functional therapeutic activities to improve functional performance for 00 minutes. Including:   reverse hyperextensions x 15 - modified ROM to limit c/o R SIJ pain      Patient Education and Home Exercises     Home Exercises Provided and Patient Education Provided     Education provided:   - Therex rationale. Dry needling procedure and consent reviewed prior to treatment  - 10/19/2022 Patient received education regarding appropriate care and removal of " Kinesiotape. Patient instructed in proper removal techniques if skin irritation occurs.    Written Home Exercises Provided: yes. Exercises were reviewed and Chaya was able to demonstrate them prior to the end of the session.  Chaya demonstrated good  understanding of the education provided. See EMR under Patient Instructions for exercises provided during therapy sessions    ASSESSMENT     Good return technique with scalene stretch, indicating good compliance at home. Progressed shoulder and core work today in upright position. Good tolerance but notes fatigue and slight R SIJ pain with elevated RLE during Left SLS rows.    As pt continues to present with min change in neck and back pain, consider referral to [facility] in the future for more aggressive strengthening, stabilization, and management of a chronic condition.    Chaya Is progressing well towards her goals.   Pt prognosis is Good.     Pt will continue to benefit from skilled outpatient physical therapy to address the deficits listed in the problem list box on initial evaluation, provide pt/family education and to maximize pt's level of independence in the home and community environment.     Pt's spiritual, cultural and educational needs considered and pt agreeable to plan of care and goals.     Anticipated barriers to physical therapy: standard, chronicity, occupation    Goals:   Short Term Goals (6 Weeks):   1. Pt will report 20% reduction in pain of the cervical spine and Lumbar spine for ease with ADL's. Progressing, not met 10/31/2022   2. Pt will demonstrate reduced self reported frequency and duration of headaches by 25%.Progressing, not met 10/31/2022   3. PT will demonstrate 1/3 MMT improvement in periscapular strength for ease with upright posture. Progressing, not met 10/31/2022   4. Pt will demonstrate improved cervical spine ROM in all directions by 5 degrees for ease with driving to MD appointments. Progressing, not met  10/31/2022   5. Pt to improve glute ad trunk strength by a half grade to allow increased ease with bending and lifting activities. Progressing, not met 10/31/2022      Long Term Goals (12 Weeks):   1. Pt will report being independent with HEP for maintenance of improvements gained during therapy sessions. Progressing, not met 10/31/2022   2. PT will report 50% reduction of pain of the neck and back for ease with dressing and grooming activities. Progressing, not met 10/31/2022   3. Pt will demonstrate full hip/LE and periscapular strength without the provocation of pain for ease with household chores. Progressing, not met 10/31/2022   4. Pt will demonstrate appropriate upright posture without external cueing for ease with work related activities. Progressing, not met 10/31/2022   5. Pt will demonstrate reduced self reported frequency and duration of headaches by 75%. Progressing, not met 10/31/2022     PLAN   Plan of care Certification: 9/23/2022 to 12/23/2022.    Continue per POC with focus on improving cervical mobility, soft tissue mobility, and core and postural stability.   Pt may benefit from future referral to [facility]Neck program to promote further strength and stability.     Niurka Gaitan, PT

## 2022-11-01 NOTE — PROGRESS NOTES
"[facility]   Physical Therapy Treatment Note     Name: Chaya Dee  Clinic Number: [id number]    Therapy Diagnosis:   Encounter Diagnoses   Name Primary?    Chronic neck and back pain Yes    Cervicogenic headache        Physician: Tess Haas FNP    Visit Date: 11/2/2022    Physician Orders: PT Eval and Treat   Medical Diagnosis from Referral:   M54.2 (ICD-10-CM) - Cervicalgia   G43.009 (ICD-10-CM) - Migraine without aura and without status migrainosus, not intractable      Evaluation Date: 9/23/2022  Authorization Period Expiration: 12/31/2022  Plan of Care Expiration: 12/23/2022  Progress Note Due: 10/23/2022  Visit # / Visits authorized: 12/ 21   FOTO: 1/3 on 9/23     Precautions: Standard       PTA Visit #: 0/5     Time In: 8:35  Time Out: 9:15  Total Billable Time: 40 minutes 1:1     SUBJECTIVE     Pt reports: having more pain in the R low back [indicates SIJ] and would like to work more on her back today.  She was compliant with home exercise program.  Response to previous treatment: relief for a few hours with dry needling and then return of symptoms  Functional change: no change    Pain: 6/10  Location: left head and neck, right low back/pelvis     OBJECTIVE     Objective Measures updated at progress report unless specified.     Updated 10/26/2022      Treatment     Chaya received the treatments listed below:    Exercises in bold performed today:     therapeutic exercises to develop strength, endurance, ROM, flexibility, posture, and core stabilization for 40 minutes including:    +self MET to correct posterior innominate rotation (R) 6 x 6"  +PPT 20 x 5"  +PPT with towel roll under sacrum/pelvis 10 x 10"  +piriformis stretch 3 x 30" - noted tightness on R>L  +PPT with francisco shoulder abd x 20 x OTB  +PPT with unilat abd x 15 x OTB  +PPT + glute set with HL 3-way clam 1 x 10 x GTB, 1 x 10 x OTB    standing 3-way hip x 15 x OTB  side stepping 2 x 30 ft x OTB at " "ankles  anti rotations 1 x 15 x BTB  SLS with unilat row 1 x 15 x BTB    NP today:  self first rib inf glide with strap x 10 deep breaths  self scalene stretch x 10 breaths in 3 positions (fwd, nose down, nose up)  still point inducer 1 x 3'  chin tucks over still point inducer x 20  +over still point inducer   Jovan ER x 20 x OTB   Jovan Abd x 20 x OTB  neuromuscular re-education activities to improve: Balance, Coordination, Proprioception, and Posture for 00 minutes. The following activities were included:    Qped TA + chin tuck 10x10" holds with 1/2 foam for TC  Qped TA + chin tuck with head rotations x 10 - changed to side bending 2* pain with rotation  Qped TA + chin tuck with alt arm lifts x 10  Qped, TA+chin tuck with alt hip ext x 10  Qped, TA+chin tuck with alt donkey kick x 10    PPT 10 x 10"   TrA + chin tuck with pilates ring press x 2 min   TrA + chin tuck with hip ADD ball squeeze x 2 min  chin tuck with soft black ball squeeze x 10 breaths  TA+ chin tuck w/ ball + 3 way clam x 15 ea x GTB  TA+ chin tuck w/ ball + UE pull aparts x 15 ea x OTB  Brace marching x 2min  seated cervical isometric sidebending 3x 10 x 3" x OTB  seated cervical isometric extension 2x 10 x 3" x OTB    1/2 foam against wall + chin tuck: standing I, T, Y x 15 ea x 2# DB    Add next visit: prone I, T, Y for scapulothoracic stabilization, TA activation series for lumbar stabilization      Manual therapy: 00 min x vacuum/cupping STM with manual therapy techniques was performed to L neck to decrease muscle tightness, increase circulation and promote healing process. The pt's skin was monitored for redness adjusting pressure as needed. The pt was instructed in possible side effects of bruising and/or soreness. Pt verbalized understanding.      dynamic functional therapeutic activities to improve functional performance for 00 minutes. Including:   reverse hyperextensions x 15 - modified ROM to limit c/o R SIJ pain      Patient Education " and Home Exercises     Home Exercises Provided and Patient Education Provided     Education provided:   - Therex rationale. Dry needling procedure and consent reviewed prior to treatment  - 10/19/2022 Patient received education regarding appropriate care and removal of Kinesiotape. Patient instructed in proper removal techniques if skin irritation occurs.    Written Home Exercises Provided: yes. Exercises were reviewed and Chaya was able to demonstrate them prior to the end of the session.  Chaya demonstrated good  understanding of the education provided. See EMR under Patient Instructions for exercises provided during therapy sessions    ASSESSMENT     Modified therex program today, per pt request, to focus more on core activation, glute and back strength, stabilization. Good tolerance overall but quick to fatigue. Required towel roll under sacrum to assist with PPT and lower lumbar stretch. Pt presented with increased R sacral rotation; pt notes improvement with low load long duration RR of scarum with unilat towel roll. Pt also noted improvement in pelvic discomfort following towel stretch. Due to pt c/o TTP with manual palpation of levator ani and pelvic floor, which she notes recreates sharp pain over tailbone while sitting, consider referral for PFPT to address remaining PF deficits that might contribute to LBP, PGP.    As pt continues to present with min change in neck and back pain, consider referral to [facility] in the future for more aggressive strengthening, stabilization, and management of a chronic condition.    Chaya Is progressing well towards her goals.   Pt prognosis is Good.     Pt will continue to benefit from skilled outpatient physical therapy to address the deficits listed in the problem list box on initial evaluation, provide pt/family education and to maximize pt's level of independence in the home and community environment.     Pt's spiritual, cultural and educational  needs considered and pt agreeable to plan of care and goals.     Anticipated barriers to physical therapy: standard, chronicity, occupation    Goals:   Short Term Goals (6 Weeks):   1. Pt will report 20% reduction in pain of the cervical spine and Lumbar spine for ease with ADL's. Progressing, not met 11/02/2022   2. Pt will demonstrate reduced self reported frequency and duration of headaches by 25%.Progressing, not met 11/02/2022   3. PT will demonstrate 1/3 MMT improvement in periscapular strength for ease with upright posture. Progressing, not met 11/02/2022   4. Pt will demonstrate improved cervical spine ROM in all directions by 5 degrees for ease with driving to MD appointments. Progressing, not met 11/02/2022   5. Pt to improve glute ad trunk strength by a half grade to allow increased ease with bending and lifting activities. Progressing, not met 11/02/2022      Long Term Goals (12 Weeks):   1. Pt will report being independent with HEP for maintenance of improvements gained during therapy sessions. Progressing, not met 11/02/2022   2. PT will report 50% reduction of pain of the neck and back for ease with dressing and grooming activities. Progressing, not met 11/02/2022   3. Pt will demonstrate full hip/LE and periscapular strength without the provocation of pain for ease with household chores. Progressing, not met 11/02/2022   4. Pt will demonstrate appropriate upright posture without external cueing for ease with work related activities. Progressing, not met 11/02/2022   5. Pt will demonstrate reduced self reported frequency and duration of headaches by 75%. Progressing, not met 11/02/2022     PLAN   Plan of care Certification: 9/23/2022 to 12/23/2022.    Continue per POC with focus on improving cervical mobility, soft tissue mobility, and core and postural stability.   Pt may benefit from future referral to Healthy Back/Neck program to promote further strength and stability.     Niurka Gaitan, PT

## 2022-11-02 ENCOUNTER — CLINICAL SUPPORT (OUTPATIENT)
Dept: REHABILITATION | Facility: OTHER | Age: 52
End: 2022-11-02
Payer: COMMERCIAL

## 2022-11-02 DIAGNOSIS — G89.29 CHRONIC NECK AND BACK PAIN: Primary | ICD-10-CM

## 2022-11-02 DIAGNOSIS — G44.86 CERVICOGENIC HEADACHE: ICD-10-CM

## 2022-11-02 DIAGNOSIS — M54.9 CHRONIC NECK AND BACK PAIN: Primary | ICD-10-CM

## 2022-11-02 DIAGNOSIS — M54.2 CHRONIC NECK AND BACK PAIN: Primary | ICD-10-CM

## 2022-11-02 PROCEDURE — 97110 THERAPEUTIC EXERCISES: CPT | Mod: PN

## 2022-11-09 ENCOUNTER — CLINICAL SUPPORT (OUTPATIENT)
Dept: REHABILITATION | Facility: OTHER | Age: 52
End: 2022-11-09
Payer: COMMERCIAL

## 2022-11-09 DIAGNOSIS — M54.9 CHRONIC NECK AND BACK PAIN: Primary | ICD-10-CM

## 2022-11-09 DIAGNOSIS — M54.2 CHRONIC NECK AND BACK PAIN: Primary | ICD-10-CM

## 2022-11-09 DIAGNOSIS — G44.86 CERVICOGENIC HEADACHE: ICD-10-CM

## 2022-11-09 DIAGNOSIS — G89.29 CHRONIC NECK AND BACK PAIN: Primary | ICD-10-CM

## 2022-11-09 PROCEDURE — 97110 THERAPEUTIC EXERCISES: CPT | Mod: PN | Performed by: PHYSICAL THERAPIST

## 2022-11-09 NOTE — PROGRESS NOTES
"[facility]   Physical Therapy Treatment Note     Name: Chaya Escudero Luiz  Clinic Number: [id number]    Therapy Diagnosis:   Encounter Diagnoses   Name Primary?    Chronic neck and back pain Yes    Cervicogenic headache        Physician: Tess Haas FNP    Visit Date: 11/9/2022    Physician Orders: PT Eval and Treat   Medical Diagnosis from Referral:   M54.2 (ICD-10-CM) - Cervicalgia   G43.009 (ICD-10-CM) - Migraine without aura and without status migrainosus, not intractable      Evaluation Date: 9/23/2022  Authorization Period Expiration: 12/31/2022  Plan of Care Expiration: 12/23/2022  Progress Note Due: 10/23/2022  Visit # / Visits authorized: 14/ 21   FOTO: 1/3 on 9/23     Precautions: Standard       PTA Visit #: 0/5     Time In: 1520  Time Out: 1600  Total Billable Time: 40 minutes 1:1     SUBJECTIVE     Pt reports: no significant changes overall. "Same thing, different day." She reports that she feels therapy has given her good tools to help with tightness, but overall she continues with neck and back pain, and headaches have gotten somewhat worse.   She was compliant with home exercise program.  Response to previous treatment: relief for a few hours with dry needling and then return of symptoms  Functional change: no change    Pain: 6/10  Location: left head and neck, right low back/pelvis     OBJECTIVE     Objective Measures updated at progress report unless specified.     Updated 10/26/2022      Treatment     Chaya received the treatments listed below:    Exercises in bold performed today:     therapeutic exercises to develop strength, endurance, ROM, flexibility, posture, and core stabilization for 40 minutes including:    Assessment of current complaints and therapy plan including referral out  Moist heat x 10 min to neck and back in supine concurrent with education    +self MET to correct posterior innominate rotation (R) 6 x 6"  +PPT 20 x 5"  +PPT with towel roll " "under sacrum/pelvis 10 x 10"  +piriformis stretch 3 x 30" - noted tightness on R>L  +PPT with jovan shoulder abd x 20 x OTB  +PPT with unilat abd x 15 x OTB  +PPT + glute set with HL 3-way clam 1 x 10 x GTB, 1 x 10 x OTB    standing 3-way hip x 15 x OTB  side stepping 2 x 30 ft x OTB at ankles  anti rotations 1 x 15 x BTB  SLS with unilat row 1 x 15 x BTB    NP today:  self first rib inf glide with strap x 10 deep breaths  self scalene stretch x 10 breaths in 3 positions (fwd, nose down, nose up)  still point inducer 1 x 3'  chin tucks over still point inducer x 20  +over still point inducer   Jovan ER x 20 x OTB   Jovan Abd x 20 x OTB  neuromuscular re-education activities to improve: Balance, Coordination, Proprioception, and Posture for 00 minutes. The following activities were included:    Qped TA + chin tuck 10x10" holds with 1/2 foam for TC  Qped TA + chin tuck with head rotations x 10 - changed to side bending 2* pain with rotation  Qped TA + chin tuck with alt arm lifts x 10  Qped, TA+chin tuck with alt hip ext x 10  Qped, TA+chin tuck with alt donkey kick x 10    PPT 10 x 10"   TrA + chin tuck with pilates ring press x 2 min   TrA + chin tuck with hip ADD ball squeeze x 2 min  chin tuck with soft black ball squeeze x 10 breaths  TA+ chin tuck w/ ball + 3 way clam x 15 ea x GTB  TA+ chin tuck w/ ball + UE pull aparts x 15 ea x OTB  Brace marching x 2min  seated cervical isometric sidebending 3x 10 x 3" x OTB  seated cervical isometric extension 2x 10 x 3" x OTB    1/2 foam against wall + chin tuck: standing I, T, Y x 15 ea x 2# DB    Add next visit: prone I, T, Y for scapulothoracic stabilization, TA activation series for lumbar stabilization      Manual therapy: 00 min x vacuum/cupping STM with manual therapy techniques was performed to L neck to decrease muscle tightness, increase circulation and promote healing process. The pt's skin was monitored for redness adjusting pressure as needed. The pt was instructed " in possible side effects of bruising and/or soreness. Pt verbalized understanding.      dynamic functional therapeutic activities to improve functional performance for 00 minutes. Including:   reverse hyperextensions x 15 - modified ROM to limit c/o R SIJ pain      Patient Education and Home Exercises     Home Exercises Provided and Patient Education Provided     Education provided:   - Therex rationale. Dry needling procedure and consent reviewed prior to treatment  - 10/19/2022 Patient received education regarding appropriate care and removal of Kinesiotape. Patient instructed in proper removal techniques if skin irritation occurs.    Written Home Exercises Provided: yes. Exercises were reviewed and Chaya was able to demonstrate them prior to the end of the session.  Cahya demonstrated good  understanding of the education provided. See EMR under Patient Instructions for exercises provided during therapy sessions    ASSESSMENT     Overall pt has reached plateau in progress. She reports recent increase in HA frequency, and continues to report frequent feeling of heaviness and tightness consistent with cervical weakness. Assessment at previous visit indicative of tension and marked tenderness to pelvic floor musculature. Due to plateau in progress, recommend referral out at this time. Pt is already scheduled to meet with neurosurgeon. Conservatively I recommend that she be assessed with Pelvic Floor PT as well as the Healthy Ne[facility]/Back program. Request sent to referring provider.     Chaya Is not progressing well towards her goals.   Pt prognosis is Good.       Pt's spiritual, cultural and educational needs considered and pt agreeable to plan of care and goals.     Anticipated barriers to physical therapy: standard, chronicity, occupation    Goals:   Short Term Goals (6 Weeks):   1. Pt will report 20% reduction in pain of the cervical spine and Lumbar spine for ease with ADL's. Progressing, not met  11/09/2022   2. Pt will demonstrate reduced self reported frequency and duration of headaches by 25%.Progressing, not met 11/09/2022   3. PT will demonstrate 1/3 MMT improvement in periscapular strength for ease with upright posture. Progressing, not met 11/09/2022   4. Pt will demonstrate improved cervical spine ROM in all directions by 5 degrees for ease with driving to MD appointments. Progressing, not met 11/09/2022   5. Pt to improve glute ad trunk strength by a half grade to allow increased ease with bending and lifting activities. Progressing, not met 11/09/2022      Long Term Goals (12 Weeks):   1. Pt will report being independent with HEP for maintenance of improvements gained during therapy sessions. Progressing, not met 11/09/2022   2. PT will report 50% reduction of pain of the neck and back for ease with dressing and grooming activities. Progressing, not met 11/09/2022   3. Pt will demonstrate full hip/LE and periscapular strength without the provocation of pain for ease with household chores. Progressing, not met 11/09/2022   4. Pt will demonstrate appropriate upright posture without external cueing for ease with work related activities. Progressing, not met 11/09/2022   5. Pt will demonstrate reduced self reported frequency and duration of headaches by 75%. Progressing, not met 11/09/2022     PLAN   Plan of care Certification: 9/23/2022 to 12/23/2022.    Discharge from this facility with recommendation to refer to PFPT and Healthy Back/Neck program.     Tia Fontanez, PT

## 2022-11-09 NOTE — PLAN OF CARE
[facility]  Physical Therapy Discharge Note    Name: Chaya Escudero Luiz  Clinic Number: [id number]    Therapy Diagnosis:   Encounter Diagnoses   Name Primary?    Chronic neck and back pain Yes    Cervicogenic headache      Physician: Tess Haas FNP    Physician Orders: PT Eval and Treat   Medical Diagnosis from Referral:   M54.2 (ICD-10-CM) - Cervicalgia   G43.009 (ICD-10-CM) - Migraine without aura and without status migrainosus, not intractable      Evaluation Date: 9/23/2022      Date of Last visit: 11/9/2022  Total Visits Received: 14    ASSESSMENT      Overall pt has reached plateau in progress. She reports recent increase in HA frequency, and continues to report frequent feeling of heaviness and tightness consistent with cervical weakness. Assessment at previous visit indicative of tension and marked tenderness to pelvic floor musculature. Due to plateau in progress, recommend referral out at this time. Pt is already scheduled to meet with neurosurgeon. Conservatively I recommend that she be assessed with Pelvic Floor PT as well as the Healthy Neck/Back program. Request sent to referring provider.     Discharge reason: Other:  plateau in progress, refer out    Discharge FOTO Score: 40%    Goals: not achieved     PLAN   This patient is discharged from Physical Therapy      Tia Fontanez, PT

## 2022-11-10 DIAGNOSIS — M53.3 SACRAL PAIN: Primary | ICD-10-CM

## 2022-11-10 DIAGNOSIS — M54.50 LOW BACK PAIN, UNSPECIFIED BACK PAIN LATERALITY, UNSPECIFIED CHRONICITY, UNSPECIFIED WHETHER SCIATICA PRESENT: ICD-10-CM

## 2022-11-23 ENCOUNTER — CLINICAL SUPPORT (OUTPATIENT)
Dept: REHABILITATION | Facility: OTHER | Age: 52
End: 2022-11-23
Attending: NURSE PRACTITIONER
Payer: COMMERCIAL

## 2022-11-23 ENCOUNTER — PATIENT MESSAGE (OUTPATIENT)
Dept: ADMINISTRATIVE | Facility: HOSPITAL | Age: 52
End: 2022-11-23
Payer: COMMERCIAL

## 2022-11-23 DIAGNOSIS — R29.898 IMPAIRED STRENGTH OF NECK MUSCLES: ICD-10-CM

## 2022-11-23 DIAGNOSIS — M54.50 LOW BACK PAIN, UNSPECIFIED BACK PAIN LATERALITY, UNSPECIFIED CHRONICITY, UNSPECIFIED WHETHER SCIATICA PRESENT: ICD-10-CM

## 2022-11-23 DIAGNOSIS — R29.898 DECREASED RANGE OF MOTION OF NECK: ICD-10-CM

## 2022-11-23 DIAGNOSIS — R29.3 POOR POSTURE: ICD-10-CM

## 2022-11-23 PROCEDURE — 97110 THERAPEUTIC EXERCISES: CPT

## 2022-11-23 PROCEDURE — 97161 PT EVAL LOW COMPLEX 20 MIN: CPT

## 2022-11-23 NOTE — PROGRESS NOTES
" [facility] - PHYSICAL THERAPY LUMBAR EVALUATION     Name: Chaya Dee  Clinic Number: [id number]    Therapy Diagnosis: No diagnosis found.  Physician: Tess Haas FNP    Physician Orders: PT Eval and Treat   Medical Diagnosis from Referral: M54.50 (ICD-10-CM) - Low back pain, unspecified back pain laterality, unspecified chronicity, unspecified whether sciatica present  Evaluation Date: 11/23/2022  Authorization Period Expiration: 11/10/2023  Plan of Care Expiration: 02/23/2023  Reassessment Due: 12/23/2022  Visit # / Visits authorized: 1/ 1    Time In: ***  Time Out: ***  Total Billable Time: *** minutes  Insurance:Fee for service Insurance Patient      Precautions: Standard    Pattern of pain determined: ***      Subjective   Date of onset: ***  History of current condition - Chaya reports: ***     Medical History:   Past Medical History:   Diagnosis Date    Allergy     Chronic allergic rhinitis 3/19/2016    Endometriosis     Laryngopharyngeal reflux disease 03/2018    PONV (postoperative nausea and vomiting)     Recurrent upper respiratory infection (URI)        Surgical History:   Chaya Dee  has a past surgical history that includes Adenoidectomy; Tonsillectomy; Nasal septum surgery; Shoulder surgery; Abdominal surgery; and Sinus surgery.    Medications:   Chaya has a current medication list which includes the following prescription(s): celecoxib, cholecalciferol (vitamin d3), cyanocobalamin, cyclobenzaprine, famotidine, gabapentin, hydroxychloroquine, pantoprazole sodium, rizatriptan, tizanidine, and topiramate.    Allergies:   Review of patient's allergies indicates:  No Known Allergies     Imaging, none    Prior Therapy: ***  Prior Treatment: ***  Social History: *** {LIVES WITH:89269}  Occupation: ***  Leisure: ***      Prior Level of Function: ***  Current Level of Function: ***  DME owned/used: ***        Pain:  Current {0-10:37888::"0"}/10, worst " "{0-10:20507::"0"}/10, best {0-10:20507::"0"}/10   Location: {RIGHT LEFT BILATERAL:36995} {LOCATION ON BODY:25977}  Description: {Pain Description:89533}  Aggravating Factors: {Causes; Pain:48906}  Easing Factors: {Pain (activities that relieve):69132}  Disturbed Sleep: ***        Pattern of pain questions:  1.  Where is your pain the worst? ***  2.  Is your pain constant or intermittent? ***  3.  Does bending forward make your typical pain worse? ***  4.  Since the start of your back pain, has there been a change in your bowel or bladder? ***  5.  What can't you do now that you use to be able to do? ***      Pts goals: ***      Red Flag Screening:   Cough  Sneeze  Strain: {+ OR -:06219}  Bladder/ bowel: {+ OR -:05602}  Falls: {+ OR -:77337}  Night pain: {+ OR -:26265}  Unexplained weight loss: {+ OR -:33990}  General health: ***    OBJECTIVE     Postural examination/scapula alignment: {AMB OT NEURO POSTURAL EXAM:87826}  Joint integrity: {AMB OT NEURO JOINT INTEGRITY:31364}  Skin integrity: {AMB OT NEURO SKIN INTEGRITY:50261}  Edema: {AMB OT NEURO EDEMA:22765}  Sitting: ***  Standing: ***  Correction of posture: {Correction of Posture:77766}    MOVEMENT LOSS    ROM Loss   Flexion {Movement Loss:68334}   Extension {Movement Loss:91017}   Side glide Right {Movement Loss:42133}   Side glide Left {Movement Loss:27617}   Rotation Right {Movement Loss:38554}   Rotation Left {Movement Loss:70384}     Lower Extremity Strength  Right LE  Left LE    Hip flexion: {AMB PT VESTIBULAR STRENGTH:00799} Hip flexion: {AMB PT VESTIBULAR STRENGTH:49072}   Hip extension:  {AMB PT VESTIBULAR STRENGTH:54526} Hip extension: {AMB PT VESTIBULAR STRENGTH:96235}   Hip abduction: {AMB PT VESTIBULAR STRENGTH:82783} Hip abduction: {AMB PT VESTIBULAR STRENGTH:73205}   Hip adduction:  {AMB PT VESTIBULAR STRENGTH:08117} Hip adduction:  {AMB PT VESTIBULAR STRENGTH:73594}   Hip External Rotation {AMB PT VESTIBULAR STRENGTH:97665} Hip External Rotation " {AMB PT VESTIBULAR STRENGTH:32656}   Hip Internal rotation   {AMB PT VESTIBULAR STRENGTH:94056} Hip Internal rotation {AMB PT VESTIBULAR STRENGTH:26307}   Knee Flexion {AMB PT VESTIBULAR STRENGTH:78699} Knee Flexion {AMB PT VESTIBULAR STRENGTH:23377}   Knee Extension {AMB PT VESTIBULAR STRENGTH:10924} Knee Extension {AMB PT VESTIBULAR STRENGTH:56410}   Ankle dorsiflexion: {AMB PT VESTIBULAR STRENGTH:14447} Ankle dorsiflexion: {AMB PT VESTIBULAR STRENGTH:47634}   Ankle plantarflexion: {AMB PT VESTIBULAR STRENGTH:48059} Ankle plantarflexion: {AMB PT VESTIBULAR STRENGTH:71862}       GAIT:  Assistive Device used: {AMB PT KNEE GAIT ASSISTIVE DEVICE:84289}  Level of Assistance: {AMB PT KNEE LEVEL OF ASSISTANCE:53667}  Patient displays the following gait deviations:  {AMB PT KNEE DISPLAYS:42374}.     Special Tests:   Test Name  Test Result   Prone Instability Test {+ OR -:74557}   SI Joint Provocation Test {+ OR -:82493}   Straight Leg Raise {+ OR -:70125}   Neural Tension Test {+ OR -:69253}   Crossed Straight Leg Raise {+ OR -:37727}   Walking on toes {+ OR -:61658}   Walking on heels  {+ OR -:05419}       NEUROLOGICAL SCREENING     Sensory deficit: ***    Reflexes:    Left Right   Patella Tendon {AMB PT KNEE DTR'S:11420} {AMB PT KNEE DTR'S:63972}   Achilles Tendon {AMB PT KNEE DTR'S:19343} {AMB PT KNEE DTR'S:88517}   Babinski  {+ OR -:27182} {+ OR -:62013}   Clonus {+ OR -:82329} {+ OR -:41454}     REPEATED TEST MOVEMENTS:    Baseline symptoms:  Repeated Flexion in Standing {Repeated Test Movements:48320}   Repeated Extension in Standing {Repeated Test Movements:33901}   Repeated Flexion in lying {Repeated Test Movements:28321}   Repeated Extension in lying  {Repeated Test Movements:40458}       STATIC TESTS and other movements:   Baseline symptoms:  Prone lie {Repeated Test Movements:57100}   Prone lie on elbows {Repeated Test Movements:01908}   Sustained prone with  using mat {Repeated Test Movements:98029}    Sustained flexion {Repeated Test Movements:23198}   Sitting slouched  {Repeated Test Movements:92034}   Sitting erect {Repeated Test Movements:61197}   Standing slouched {Repeated Test Movements:94708}   Standing erect  {Repeated Test Movements:71515}   Lying prone in extension  {Repeated Test Movements:89127}   Long sitting   {Repeated Test Movements:53704}   Other tests Repeated Test Movements:88704}       Baseline Isometric Testing on Med X equipment: Testing administered by PT  Date of testing: ***  ROM *** deg   Max Peak Torque ***    Min Peak Torque ***    Flex/Ext Ratio ***   % below normative data ***         Limitation/Restriction for FOTO Lumbar Spine Survey    Therapist reviewed FOTO scores for Chaya Dee on 11/23/2022.   FOTO documents entered into EPIC - see Media section.    Limitation Score: ***%             Treatment   Treatment Time In: ***  Treatment Time Out: ***  Total Treatment time separate from Evaluation: *** minutes      Chaya received therapeutic exercises to develop/improve posture, lumbar/cervical ROM, strength and muscular endurance for *** minutes including the following exercises:         Written Home Exercises Provided: yes.  Exercises were reviewed and Chaya was able to demonstrate them prior to the end of the session.  Chaya demonstrated good  understanding of the education provided.     See EMR under Patient Instructions for exercises provided 11/23/2022.      Education provided:   - Patient received education regarding proper posture and body mechanics.  Patient was given top [facility] Visit 1 handouts which discuss what to expect in therapy, the purpose and opportunity for health coaching, the program,  wellness when discharged from therapy, back education and care specifically for posture seated, standing, lifting correctly, components of exercise, importance of nutrition and hydration, and importance of sleep.   Information on lumbar rolls  provided.  - Jd bynum tried, recommended, and purchase information was provided.    - Patient received a handout regarding anticipated muscular soreness following the isometric test and strategies for management were reviewed with patient including stretching, using ice and scheduled rest.   - Patient received education on the [facility] program, purpose of the isometric test, progression of back strengthening as well as wellness approach and systemic strengthening.  Details of the program were discussed.  Reviewed that patient should feel support/pressure from med ex restraints but no pain or discomfort and patient expressed understanding.  Med x dynamic exercise and baseline IM test performed with instructions to guide the patient safely through the isometric testing.  Patient informed to perform isometric test correctly, and safely, building best force they safely can and not pushing through pain.  Patient demonstrated understanding of information      Chaya received cold pack for *** minutes to ***.    Assessment   Chaya is a 52 y.o. female referred to [facility] with a medical diagnosis of Low back pain, unspecified back pain laterality, unspecified chronicity, unspecified whether sciatica present. Pt presents with ***    Pain Pattern: ***       Pt prognosis is {REHAB PROGNOSIS OHS:93104}.   Pt will benefit from skilled outpatient Physical Therapy to address the deficits stated above and in the chart below, provide pt/family education, and to maximize pt's level of independence. Based on the above history and physical examination an active physical therapy program is recommended.  Pt will continue to benefit from skilled outpatient physical therapy to address the deficits listed below in the chart, provide pt/family education and to maximize pt's level of independence in the home and community environment. .       Plan of care discussed with patient: Yes  Pt's spiritual, cultural and  educational needs considered and patient is agreeable to the plan of care and goals as stated below:     Anticipated Barriers for therapy: ***    PT Evaluation Completed? Yes    Medical necessity is demonstrated by the following problem list.    Pt presents with the following impairments:     History  Co-morbidities and personal factors that may impact the plan of care Co-morbidities:   Endometriosis and Laryngopharyngeal reflux disease    Personal Factors:   {Personal Factors:14927}     low   Examination  Body Structures and Functions, activity limitations and participation restrictions that may impact the plan of care Body Regions:   {Body Regions:98193}    Body Systems:    {Body Systems:89617}    Participation Restrictions:   ***    Activity limitations:   Learning and applying knowledge  {Learning and applying knowledge:42184}    General Tasks and Commands  {Gen tasks and commands:23804}    Communication  {Communication:23924}    Mobility  {Mobility:51513}    Self care  {Self Care:02699}    Domestic Life  {Domestic Life:38112}    Interactions/Relationships  {Interactions/Relationships:34443}    Life Areas  {Life Areas:58587}    Community and Social Life  {Community/Social Life:89392}         {Desc; low/moderate/high:952733}   Clinical Presentation {Clinical Presentation :51965} {Desc; low/moderate/high:441029}   Decision Making/ Complexity Score: {Desc; low/moderate/high:678367}       GOALS: Pt is in agreement with the following goals.    Short term goals:  6 weeks or 10 visits   1.  Pt will demonstrate increased lumbar ROM by at least *** degrees from the initial ROM value with improvements noted in functional ROM and ability to perform ADLs.  (approp and ongoing)  2.  Pt will demonstrate increased MedX average isometric strength value  by ***% from initial test resulting in improved ability to perform bending, lifting, and carrying activities safely, confidently.  (approp and ongoing)  3.  Patient report a  "reduction in worst pain score by 1-2 points for improved tolerance for ***.  (approp and ongoing)  4.  Pt able to perform HEP correctly with minimal cueing or supervision from therapist to encourage independent management of symptoms. (approp and ongoing)      Long term goals: 10 weeks or 20 visits   1. Pt will demonstrate increased lumbar ROM by at least *** degrees from initial ROM value, resulting in improved ability to perform functional fwd bending while standing and sitting. (approp and ongoing)  2. Pt will demonstrate increased MedX average isometric strength value  by ***% from initial test resulting in improved ability to perform bending, lifting, and carrying activities safely, confidently.  (approp and ongoing)  3. Pt to demonstrate ability to independently control and reduce their pain through posture positioning and mechanical movements throughout a typical day.  (approp and ongoing)  4.  Pt will demonstrate reduced pain and improved functional outcomes as reported on the FOTO by reaching a limitation score of < or = ***% or less in order to demonstrate subjective improvement in pt's condition.    (approp and ongoing)  5. Pt will demonstrate independence with the HEP at discharge  (approp and ongoing)  6.  ***(patient goal)***  (approp and ongoing)      Plan   Outpatient physical therapy 2x week for 10 weeks or 20 visits to include the following:   - Patient education  - Therapeutic exercise  - Manual therapy  - Performance testing   - Neuromuscular Re-education  - Therapeutic activity   - Modalities    Pt may be seen by PTA as part of the rehabilitation team.     Therapist: Dolores Bunch, PT  11/23/2022    "I certify the need for these services furnished under this plan of treatment and while under my care."    ____________________________________  Physician/Referring Practitioner    _______________  Date of Signature            "

## 2022-11-28 DIAGNOSIS — M54.2 CERVICALGIA: Primary | ICD-10-CM

## 2022-11-28 PROBLEM — R29.3 POOR POSTURE: Status: ACTIVE | Noted: 2022-11-28

## 2022-11-28 PROBLEM — R29.898 DECREASED RANGE OF MOTION OF NECK: Status: ACTIVE | Noted: 2022-11-28

## 2022-11-28 PROBLEM — R29.898 IMPAIRED STRENGTH OF NECK MUSCLES: Status: ACTIVE | Noted: 2022-11-28

## 2022-11-28 NOTE — PLAN OF CARE
[facility] - PHYSICAL THERAPY EVALUATION     Name: Chaya [place]  Clinic Number: [id number]    Therapy Diagnosis:   Encounter Diagnoses   Name Primary?    Low back pain, unspecified back pain laterality, unspecified chronicity, unspecified whether sciatica present     Decreased range of motion of neck     Impaired strength of neck muscles     Poor posture      Physician: Tess Haas FNP    Physician Orders: PT Eval and Treat   Medical Diagnosis from Referral: M54.2 (ICD-10-CM) - Cervicalgia  Evaluation Date: 11/23/2022  Authorization Period Expiration: 11/28/2023  Plan of Care Expiration: 2/23/2023  Reassessment Due: 12/23/2022  Visit # / Visits authorized: 1/ 1    Time In: 1:00  Time Out: 2:40  Total Billable Time: 100 minutes  Insurance: Fee for service Insurance Patient      Precautions: Standard    Pattern of pain determined: Pattern 1 general      Subjective   Date of onset: 2017  History of current condition - Chaya reports: She would like to start with her neck and then work on her lower back as she is about to start pelvic floor therapy in January. Her neck pain has been getting worse and worse. Has had injections twice and they did nothing. She did PT at [facility] previously and has done nothing about it after that. It has been getting worse over the last 2 years. She is on a waiting list to get back in to see Dr. Mcgarry to get some more recent imaging done. There are certain days where is driving and she can't turn her head to look over shoulders. She can't stand for too long, or sit for too long. She has tried inserts in her shoes. Feels like she is running out of options. She has a standing desk at work, and ergonomic chairs. Fetal position at night is the most comfortable. Tail bone constantly hurts right here (indicated L SI joint). She has continued with her chin tucks. L side of the neck is worst. Feels like her neck gives her the most problems in her daily life. No trouble  with stairs. She has a cushion thing she sits on because of her tailbone pain. Insidious onset of back and neck pain. Did have tingling in arms a long time ago but that has been gone away. Is L handed.     Medical History:   Past Medical History:   Diagnosis Date    Allergy     Chronic allergic rhinitis 3/19/2016    Endometriosis     Laryngopharyngeal reflux disease 03/2018    PONV (postoperative nausea and vomiting)     Recurrent upper respiratory infection (URI)        Surgical History:   Chaya Dee  has a past surgical history that includes Adenoidectomy; Tonsillectomy; Nasal septum surgery; Shoulder surgery; Abdominal surgery; and Sinus surgery.    Medications:   Chaya has a current medication list which includes the following prescription(s): celecoxib, cholecalciferol (vitamin d3), cyanocobalamin, cyclobenzaprine, famotidine, gabapentin, hydroxychloroquine, pantoprazole sodium, rizatriptan, tizanidine, and topiramate.    Allergies:   Review of patient's allergies indicates:  No Known Allergies     Imaging, none    Prior Therapy: yes for neck and back- did not provide much relief.   Prior Treatment: has done chriropracter for years- did not help. Did injections 2x.  Social History: 2 story house. lives with their spouse  Occupation:  and   Leisure: go to concerts, go out to dinner      Prior Level of Function: independent  Current Level of Function: independent with increased pain and difficulty  DME owned/used: none        Pain:  Current 5/10, worst 8/10, best 3/10   Location: bilateral back (R>L in the back), and neck (L>R)  Description: Aching and Dull  Aggravating Factors: maintaining 1 position for too long, lifting things  Easing Factors: massage relieves for like 1 hour then the pain comes back  Disturbed Sleep: has trouble falling asleep, and then staying asleep. Sleeps maybe 4 hours at a time.        Pattern of pain questions:  1.  Where is your pain the  worst? Neck; L>R  2.  Is your pain constant or intermittent? Constant   3.  Does bending forward make your typical pain worse? Yes for neck and back  4.  Since the start of your neck pain, has there been a change in your bowel or bladder? none  5.  What can't you do now that you use to be able to do? Attend concerts as she cannot stand that long      Pts goals: be able to not be in pain, travel, strengthen/gain mobility      Red Flag Screening:   Cough  Sneeze  Strain: (+) neck  Bladder/ bowel: (--)  Falls: (--)  Night pain: (+)- sleeping  Unexplained weight loss: (--)  General health: good    OBJECTIVE   Postural examination/scapula alignment: Rounded shoulder  Joint integrity: Hard end feel  Skin integrity: normal  Edema: normal  Correction of posture: better with lumbar roll    Range of Motion - MOVEMENT LOSS    ROM Loss   Flexion minimal loss   Extension minimal loss   Side bending Right minimal loss   Side bending Left moderate loss pain L neck   Rotation Right minimal loss   Rotation Left moderate loss pain L neck   Protraction minimal loss pain suboccipitals   Retraction  moderate loss pain in suboccipitals       Upper Extremity Strength  (R) UE  (L) UE    Shoulder flexion: 4/5 Shoulder flexion: 4/5   Shoulder Abduction: 4+/5 Shoulder abduction: 4+/5   Shoulder ER: (GH abd) 4/5 Shoulder ER: (GH abd) 4/5   Shoulder IR: 4+/5 Shoulder IR: 4+/5       NEUROLOGICAL SCREEN    Sensory deficit: light touch grossly intact    Special Tests:   Test Name  Testing Result   Compression (--)   Distraction No change   Neural Tension Test (--)     Reflexes:    Left Right   Biceps  2+ 2+   Brachioradialis  2+ 2+   Clonus (--) (--)       REPEATED TEST MOVEMENTS:   Repeated Protraction in Sitting no effect   Repeated Flexion in Sitting no effect   Repeated Retraction in Sitting  no effect   Repeated Retraction Extension in Sitting no effect       Baseline Isometric Testing on Med X equipment:  Testing administered by PT  Date of  testin2022  ROM 42-96 deg   Max Peak Torque 104    Min Peak Torque 79    Flex/Ext Ratio 1.3   % below normative data 52%       GAIT:  Assistive Device used: none  Level of Assistance: independent  Patient displays the following gait deviations:  no gait deviations observed.         Limitation/Restriction for FOTO Neck Survey    Therapist reviewed FOTO scores for Chaya Dee on 2022.   FOTO documents entered into [employer] - see Media section.    Limitation Score: 56%             Treatment   Treatment Time In: 2:10  Treatment Time Out: 2:40  Total Treatment time separate from Evaluation: 30 minutes    Chaya received therapeutic exercises to develop/improve posture, lumbar/cervical ROM, strength and muscular endurance for 30 minutes including the following exercises:   Med x dynamic exercise and baseline IM testing    Cervical towel rotation stretch 5x B  Scap retractions 5x  Chin tucks 10x    [facility] 2022   Visit Number 1   VAS Pain Rating 5   Cervical Stretches - Retraction In Lying 10   Cervical Extension Seat Pad 2   Seat Adjustment 370   Top Dead Center 66   Counterweight 1.5   Cervical Flexion 96   Cervical Extension 42   Cervical Peak Torque 104   Cervical Weight 60   Repetitions 5   Ice - Z Lie (in min.) 10         Written Home Exercises Provided: yes.  Exercises were reviewed and Chaya was able to demonstrate them prior to the end of the session.  Chaya demonstrated good  understanding of the education provided.     See EMR under Patient Instructions for exercises provided 2022.      Education provided:   - Patient received education regarding proper posture and body mechanics.  Patient was given top [facility] Visit 1 handouts which discuss what to expect in therapy, the purpose and opportunity for health coaching, the program,  wellness when discharged from therapy, back education and care specifically for posture seated, standing, lifting  correctly, components of exercise, importance of nutrition and hydration, and importance of sleep.   Information on lumbar rolls provided.  Patient received education regarding proper posture and body mechanics.  - Jd roll tried, recommended, and purchase information was provided.    - Patient received a handout regarding anticipated muscular soreness following the isometric test and strategies for management were reviewed with patient including stretching, using ice and scheduled rest.   - Patient received education on the [facility] program, purpose of the isometric test, progression of neck strengthening as well as wellness approach and systemic strengthening.  Details of the program were discussed.  Reviewed that patient should feel support/pressure from med ex restraints but no pain or discomfort and patient expressed understanding.  Med x dynamic exercise and baseline IM test performed with instructions to guide the patient safely through the isometric testing.  Patient informed to perform isometric test correctly, and safely, building best force they safely can and not pushing through pain.  Patient demonstrated understanding of information        Chaya received cold pack for 10 minutes to neck.    Assessment   Chaya is a 52 y.o. female referred to [facility] with a medical diagnosis of Cervicalgia. Pt presents with a chief complaint of L sided neck pain that radiates down her arm. She was noted to have weakness in UE musculature, decreased cervical joint mobility, poor posture, and decreased cervical range of motion. With Medx cervical testing, she was noted to be 52% below age norms for strength. These impairments are impacting her ability to perform her usual ADLs, or participate in recreational activities of going to concerts or traveling. She would benefit from skilled physical therapy to address these impairments, reduce pain, and improve functional mobility to return her to  her prior level of function.    Pain Pattern: Pattern 1 general       Pt prognosis is Good.   Pt will benefit from skilled outpatient Physical Therapy to address the deficits stated above and in the chart below, provide pt/family education, and to maximize pt's level of independence.     Plan of care discussed with patient: Yes  Pt's spiritual, cultural and educational needs considered and patient is agreeable to the plan of care and goals as stated below:     Anticipated Barriers for therapy: none    PT Evaluation Completed? Yes    Medical necessity is demonstrated by the following problem list.    Pt presents with the following impairments:     History  Co-morbidities and personal factors that may impact the plan of care Co-morbidities:   Endometriosis    Personal Factors:   no deficits     low   Examination  Body Structures and Functions, activity limitations and participation restrictions that may impact the plan of care Body Regions:   neck  upper extremities    Body Systems:    ROM  strength    Participation Restrictions:   Unable to travel, attend concerts, increased difficulty with ADLs    Activity limitations:   Learning and applying knowledge  no deficits    General Tasks and Commands  no deficits    Communication  no deficits    Mobility  lifting and carrying objects  walking  driving (bike, car, motorcycle)    Self care  no deficits    Domestic Life  shopping  cooking  doing house work (cleaning house, washing dishes, laundry)  assisting others    Interactions/Relationships  no deficits    Life Areas  no deficits    Community and Social Life  community life  recreation and leisure         low   Clinical Presentation stable and uncomplicated low   Decision Making/ Complexity Score: low       GOALS: Pt is in agreement with the following goals.    Short term goals: 6 weeks or 10 visits   1.  Pt will demonstratte increased cervical ROM as measured by med ex by 8 degrees from initial test which results in  improved  ROM of neck for ease with ADLs and driving  (approp and ongoing)  2. Pt will demonstrate independence with reducing or controlling symptoms with ther ex, movement, or position independently, able to reduce pain 1-2 points on pain scale using strategies taught in therapy  (approp and ongoing)  3. Pt will demonstrate increased MedX average isometric strength value by 15% with  when compared to the initial testing resulting in improved ability to perform bending, lifting, and carrying activities safely, confidently.  (approp and ongoing)        Long term goals: 10 weeks or 20 visits  1. Pt will demonstratte increased cervical ROM as measured by med ex by 12 degrees from initial test which results in functional ROM of neck for ease with ADLs and driving  (approp and ongoing)  2. Pt will demonstrate increased MedX average isometric strength value  by 30% from initial test to improve ability to lift and carry, and sustain good posture while performing ADL's  (approp and ongoing)  3.Pt will demonstrate reduced pain and improved functional outcomes as reported on the FOTO by reaching a limitation score of < or = 30% or less in order to demonstrate subjective improvement in pt's condition.    (approp and ongoing)  4. Pt will demonstrate independence with reducing or controlling symptoms with ther ex, movement, or position independently, able to reduce pain 2-4 points on pain scale using strategies taught in therapy  (approp and ongoing)  5. Pt will demonstrate independence with the HEP at discharge.   (approp and ongoing)  6.  Pt will be able to stand for at least 1 hour without pain to improve her ability to attend concerts. (approp and ongoing)    Plan   Outpatient physical therapy 2x week for 10 weeks or 20 visits to include the following:   - Patient education  - Therapeutic exercise  - Manual therapy  - Performance testing   - Neuromuscular Re-education  - Therapeutic activity   - Modalities    Pt may be seen  "by PTA as part of the rehabilitation team.     Therapist: Dolores Bunch, PT  11/28/2022      "I certify the need for these services furnished under this plan of treatment and while under my care."    ____________________________________  Physician/Referring Practitioner    _______________  Date of Signature    "

## 2022-12-06 ENCOUNTER — CLINICAL SUPPORT (OUTPATIENT)
Dept: REHABILITATION | Facility: OTHER | Age: 52
End: 2022-12-06
Attending: NURSE PRACTITIONER
Payer: COMMERCIAL

## 2022-12-06 DIAGNOSIS — R29.898 IMPAIRED STRENGTH OF NECK MUSCLES: ICD-10-CM

## 2022-12-06 DIAGNOSIS — R29.3 POOR POSTURE: ICD-10-CM

## 2022-12-06 DIAGNOSIS — R29.898 DECREASED RANGE OF MOTION OF NECK: Primary | ICD-10-CM

## 2022-12-06 PROCEDURE — 97110 THERAPEUTIC EXERCISES: CPT | Mod: CQ

## 2022-12-06 NOTE — PROGRESS NOTES
[facility] Physical Therapy Treatment      Name: Chaya Dee  Clinic Number: [id number]    Therapy Diagnosis:   Encounter Diagnoses   Name Primary?    Decreased range of motion of neck Yes    Impaired strength of neck muscles     Poor posture      Physician: Tess Haas FNP    Visit Date: 2022    Physician Orders: PT Eval and Treat   Medical Diagnosis from Referral: M54.2 (ICD-10-CM) - Cervicalgia  Evaluation Date: 2022  Authorization Period Expiration: 2023  Plan of Care Expiration: 2023  Reassessment Due: 2022  Visit # / Visits authorized:     Time In: 4:00 pm  Time Out: 4:45 pm  Total Billable Time: 45 minutes  Insurance type:  Fee for service Insurance Patient    Precautions: Standard    Pattern of pain determined: Pattern 1     Subjective   Chaya reports constant pain that varies in intensity daily.  She says she has very poor range making driving difficult at times. Today, she has no radiating pain into UE.    Patient reports tolerating previous visit fairly well c/ some soreness.  Patient reports their pain to be 7/10 on a 0-10 scale with 0 being no pain and 10 being the worst pain imaginable.  Pain Location: B cervical, L>R     Occupation:  and   Leisure: go to concerts, go out to dinner   Pts goals: be able to not be in pain, travel, strengthen/gain mobility    Objective     Baseline Isometric Testing on Med X equipment:  Testing administered by PT  Date of testin2022  ROM 42-96 deg   Max Peak Torque 104    Min Peak Torque 79    Flex/Ext Ratio 1.3   % below normative data 52%       Outcomes:  Initial score: 56%  Visit 5 score:  Goal:      Treatment    Pt was instructed in and performed the following:     Chaya received therapeutic exercises to develop/improved posture, cardiovascular endurance, muscular endurance, lumbar/cervical ROM, strength and muscular endurance for 60 minutes including the  following exercises:     Cervical towel rotation stretch 5x 5 sec B  Scap retractions 5x  Chin tucks 10x (protraction and retraction)  UT stretch 20 sec x2 (painful L)  Levator stretch 20 sec x2    [facility] 12/6/2022   Visit Number 2   VAS Pain Rating 7   Treadmill Time (in min.) 5   Cervical Stretches - Retraction In Lying -   Retraction in Sitting 10   Rotation 5   Cervical Extension Seat Pad -   Seat Adjustment -   Top Dead Center -   Counterweight -   Cervical Flexion -   Cervical Extension -   Cervical Peak Torque -   Cervical Weight 84   Repetitions 18   Rating of Perceived Exertion 6   Ice - Z Lie (in min.) 5             Peripheral muscle strengthening which included 1 set of 15-20 repetitions at a slow, controlled 10-13 second per rep pace focused on strengthening supporting musculature for improved body mechanics and functional mobility.  Pt and therapist focused on proper form during treatment to ensure optimal strengthening of each targeted muscle group.  Machines were utilized including torso rotation, leg extension, leg curl, chest press, upright row. Tricep extension, bicep curl, leg press, and hip abduction added visit 3    Chaya received the following manual therapy techniques:  were applied to the:  for  minutes.         Home Exercises Provided and Patient Education Provided   Home exercises include: yes, Cervical towel rotation, Scap retractions, Chin tucks  Cardio program: visit 5  Lifting education date: visit 11  Posture/Lumbar roll: discussed    Education provided:   - MedX pacing   -PRE scale    Written Home Exercises Provided: Patient instructed to cont prior HEP.  Exercises were reviewed and Chaya was able to demonstrate them prior to the end of the session.  Chaya demonstrated good  understanding of the education provided.     See EMR under Patient Instructions for exercises provided prior visit.      Assessment   Chaya returns for 2nd HB with increased cervical  "pain and no radiating pain. Treatment initiated with review of HEP and education of Andrew PRE scale and exercise pacing.     Cervical MedX weight initiated at approx 80% max IM testing value.  Pt complete 18 reps at 6/10 RPE today indicating muscle activation without increasing pain.  Pt complete first half of MedX peripheral exercises today  /s discomfort.  Progress as tolerated.     EDUCATION:  -Patient received education in benefits of progressing mobility and strengthening gradually  -Discussed exertion scale, slow progressive resistance protocol to promote safe and healthy strengthening of supportive musculature  by performing 15-20 reps, 7 sec per rep, and increasing weight by 5 % at 20 reps only if there ex done safely, slowly, using correct musculature, exertion and no pain.  Patient expressed understanding.  -Pt educated on strategies to safely perform examination and exercise using "Stop Light" analogy to describe how to avoid or stop irritating motions, proceed with caution with some movements, and progress positive exercises.    Patient is making good progress towards established goals.  Pt will continue to benefit from skilled outpatient physical therapy to address the deficits stated in the impairment chart, provide pt/family education and to maximize pt's level of independence in the home and community environment.     Anticipated Barriers for therapy: none  Pt's spiritual, cultural and educational needs considered and pt agreeable to plan of care and goals as stated below:         GOALS: Pt is in agreement with the following goals.     Short term goals: 6 weeks or 10 visits   1.  Pt will demonstratte increased cervical ROM as measured by med ex by 8 degrees from initial test which results in improved  ROM of neck for ease with ADLs and driving  (approp and ongoing)  2. Pt will demonstrate independence with reducing or controlling symptoms with ther ex, movement, or position independently, able to " reduce pain 1-2 points on pain scale using strategies taught in therapy  (approp and ongoing)  3. Pt will demonstrate increased MedX average isometric strength value by 15% with  when compared to the initial testing resulting in improved ability to perform bending, lifting, and carrying activities safely, confidently.  (approp and ongoing)         Long term goals: 10 weeks or 20 visits  1. Pt will demonstratte increased cervical ROM as measured by med ex by 12 degrees from initial test which results in functional ROM of neck for ease with ADLs and driving  (approp and ongoing)  2. Pt will demonstrate increased MedX average isometric strength value  by 30% from initial test to improve ability to lift and carry, and sustain good posture while performing ADL's  (approp and ongoing)  3.Pt will demonstrate reduced pain and improved functional outcomes as reported on the FOTO by reaching a limitation score of < or = 30% or less in order to demonstrate subjective improvement in pt's condition.    (approp and ongoing)  4. Pt will demonstrate independence with reducing or controlling symptoms with ther ex, movement, or position independently, able to reduce pain 2-4 points on pain scale using strategies taught in therapy  (approp and ongoing)  5. Pt will demonstrate independence with the HEP at discharge.   (approp and ongoing)  6.  Pt will be able to stand for at least 1 hour without pain to improve her ability to attend concerts. (approp and ongoing)           Plan   Continue with established Plan of Care towards established PT goals.       Therapist: Tarah Luo, PTA  12/6/2022

## 2022-12-08 ENCOUNTER — CLINICAL SUPPORT (OUTPATIENT)
Dept: REHABILITATION | Facility: OTHER | Age: 52
End: 2022-12-08
Attending: NURSE PRACTITIONER
Payer: COMMERCIAL

## 2022-12-08 DIAGNOSIS — R29.898 IMPAIRED STRENGTH OF NECK MUSCLES: ICD-10-CM

## 2022-12-08 DIAGNOSIS — R29.3 POOR POSTURE: ICD-10-CM

## 2022-12-08 DIAGNOSIS — R29.898 DECREASED RANGE OF MOTION OF NECK: Primary | ICD-10-CM

## 2022-12-08 PROCEDURE — 97110 THERAPEUTIC EXERCISES: CPT | Mod: CQ

## 2022-12-08 PROCEDURE — 97140 MANUAL THERAPY 1/> REGIONS: CPT | Mod: CQ

## 2022-12-08 NOTE — PROGRESS NOTES
[facility] Physical Therapy Treatment      Name: Chaya Escudero Luiz  Clinic Number: [id number]    Therapy Diagnosis:   Encounter Diagnoses   Name Primary?    Decreased range of motion of neck Yes    Impaired strength of neck muscles     Poor posture      Physician: Tess Haas FNP    Visit Date: 2022    Physician Orders: PT Eval and Treat   Medical Diagnosis from Referral: M54.2 (ICD-10-CM) - Cervicalgia  Evaluation Date: 2022  Authorization Period Expiration: 2023  Plan of Care Expiration: 2023  Reassessment Due: 2022  Visit # / Visits authorized: 3/ 20    Time In: 8:26 am  Time Out: 9:31 am  Total Billable Time: 60 minutes  Insurance type:  Fee for service Insurance Patient    Precautions: Standard    Pattern of pain determined: Pattern 1     Subjective   Chaya reports continued elevated L sided cervical pain.  She experiened mod increase in soreness following first follow up visit. She recalls experiencing increased pain when turning her head to the L to look in the mirror this morning.    Patient reports tolerating previous visit fairly well c/ some soreness.  Patient reports their pain to be 7/10 on a 0-10 scale with 0 being no pain and 10 being the worst pain imaginable.  Pain Location: B cervical, L>R     Occupation:  and   Leisure: go to concerts, go out to dinner   Pts goals: be able to not be in pain, travel, strengthen/gain mobility    Objective     Baseline Isometric Testing on Med X equipment:  Testing administered by PT  Date of testin2022  ROM 42-96 deg   Max Peak Torque 104    Min Peak Torque 79    Flex/Ext Ratio 1.3   % below normative data 52%       Outcomes:  Initial score: 56%  Visit 5 score:  Goal:      Treatment    Pt was instructed in and performed the following:     Chaya received therapeutic exercises to develop/improved posture, cardiovascular endurance, muscular endurance, lumbar/cervical ROM,  strength and muscular endurance for 45 minutes including the following exercises:     Cervical towel rotation stretch 5x 5 sec B  Scap retractions 15x  Chin tucks 10x   UT stretch 20 sec x3   Levator stretch 20 sec x3  No money RTB x15    [facility] - Short 12/8/2022   Visit Number 3   VAS Pain Rating 7   Treadmill Time (in min.) 5   Retraction in Lying -   Retraction in Sitting 10   Sidebending 3   Rotation 5   Scapular Retraction 15   Manual Therapy 15   Cervical Extension - Seat Pad -   Seat Adjustment -   Top Dead Center -   Counterweight -   Cervical Flexion -   Cervical Extension -   Cervical Peak Torque -   Cervical Weight 84   Repetitions 20   Rating of Perceived Exertion 4         Peripheral muscle strengthening which included 1 set of 15-20 repetitions at a slow, controlled 10-13 second per rep pace focused on strengthening supporting musculature for improved body mechanics and functional mobility.  Pt and therapist focused on proper form during treatment to ensure optimal strengthening of each targeted muscle group.  Machines were utilized including torso rotation, leg extension, leg curl, chest press, upright row. Tricep extension, bicep curl, leg press, and hip abduction added visit 3    Chaya received the following manual therapy techniques: STM to L sided UT/cervical paraspinals, Suboccipital release, Manual UT/cervical rotation stretch were applied for 15 minutes.         Home Exercises Provided and Patient Education Provided   Home exercises include: yes, Cervical towel rotation, Scap retractions, Chin tucks  Cardio program: visit 5  Lifting education date: visit 11  Posture/Lumbar roll: discussed    Education provided:   - MedX pacing   -PRE scale    Written Home Exercises Provided: Patient instructed to cont prior HEP.  Exercises were reviewed and Chaya was able to demonstrate them prior to the end of the session.  Chaya demonstrated good  understanding of the education  provided.     See EMR under Patient Instructions for exercises provided prior visit.      Assessment   Chaya returned continuing to report elevated L sided cervical pain levels.  She tolerated first follow up visit fair experiencing mod increase in residual muscular soreness.  Treatment began with manual techniques in effort to decrease muscular stiffness and decrease pain levels.  Cervical and periscapular mobility and strengthening ex's continued and progressed by initiating no money to continue to challenge periscapular musculature.  Treatment tolerated well with no increase of symptoms.  Medx resistance remained at 84#. She completed 20 reps at a RPE of 4/10. Mod verbal cues to maintain proper head placement and not turn head while on medx.  Will monitor and attempt to progress per pt's tolerance and HB protocol.      Anticipated Barriers for therapy: none  Pt's spiritual, cultural and educational needs considered and pt agreeable to plan of care and goals as stated below:         GOALS: Pt is in agreement with the following goals.     Short term goals: 6 weeks or 10 visits   1.  Pt will demonstratte increased cervical ROM as measured by med ex by 8 degrees from initial test which results in improved  ROM of neck for ease with ADLs and driving  (approp and ongoing)  2. Pt will demonstrate independence with reducing or controlling symptoms with ther ex, movement, or position independently, able to reduce pain 1-2 points on pain scale using strategies taught in therapy  (approp and ongoing)  3. Pt will demonstrate increased MedX average isometric strength value by 15% with  when compared to the initial testing resulting in improved ability to perform bending, lifting, and carrying activities safely, confidently.  (approp and ongoing)         Long term goals: 10 weeks or 20 visits  1. Pt will demonstratte increased cervical ROM as measured by med ex by 12 degrees from initial test which results in functional  ROM of neck for ease with ADLs and driving  (approp and ongoing)  2. Pt will demonstrate increased MedX average isometric strength value  by 30% from initial test to improve ability to lift and carry, and sustain good posture while performing ADL's  (approp and ongoing)  3.Pt will demonstrate reduced pain and improved functional outcomes as reported on the FOTO by reaching a limitation score of < or = 30% or less in order to demonstrate subjective improvement in pt's condition.    (approp and ongoing)  4. Pt will demonstrate independence with reducing or controlling symptoms with ther ex, movement, or position independently, able to reduce pain 2-4 points on pain scale using strategies taught in therapy  (approp and ongoing)  5. Pt will demonstrate independence with the HEP at discharge.   (approp and ongoing)  6.  Pt will be able to stand for at least 1 hour without pain to improve her ability to attend concerts. (approp and ongoing)           Plan   Continue with established Plan of Care towards established PT goals.       Therapist: Tacos Cardona, PTA  12/8/2022

## 2022-12-13 NOTE — PROGRESS NOTES
[facility] Physical Therapy Treatment      Name: Chaya Escudero Luiz  Clinic Number: [id number]    Therapy Diagnosis:   Encounter Diagnoses   Name Primary?    Decreased range of motion of neck Yes    Impaired strength of neck muscles     Poor posture        Physician: Tess Haas FNP    Visit Date: 2022    Physician Orders: PT Eval and Treat   Medical Diagnosis from Referral: M54.2 (ICD-10-CM) - Cervicalgia  Evaluation Date: 2022  Authorization Period Expiration: 2023  Plan of Care Expiration: 2023  Reassessment Due: 2022  Visit # / Visits authorized:     Time In: 9:30 am  Time Out: 10:30 am  Total Billable Time: 60 minutes  Insurance type:  Fee for service Insurance Patient    Precautions: Standard    Pattern of pain determined: Pattern 1     Subjective   Chaya  reports the L side of her neck continues to hurt. She went to yoga the other day and tried a few new poses and she was unable to do some of them, especially one where she laid on the ground and turned her head.    Patient reports tolerating previous visit fairly well c/ some soreness.  Patient reports their pain to be 7/10 on a 0-10 scale with 0 being no pain and 10 being the worst pain imaginable.  Pain Location: B cervical, L>R     Occupation:  and   Leisure: go to concerts, go out to dinner   Pts goals: be able to not be in pain, travel, strengthen/gain mobility    Objective     Baseline Isometric Testing on Med X equipment:  Testing administered by PT  Date of testin2022  ROM 42-96 deg   Max Peak Torque 104    Min Peak Torque 79    Flex/Ext Ratio 1.3   % below normative data 52%       Outcomes:  Initial score: 56%  Visit 5 score:  Goal:      Treatment    Pt was instructed in and performed the following:     Chaya received therapeutic exercises to develop/improved posture, cardiovascular endurance, muscular endurance, lumbar/cervical ROM, strength and  muscular endurance for 45 minutes including the following exercises:     Cervical towel rotation stretch 5x 5 sec B  Scap retractions 15x  Chin tucks 10x   UT stretch 20 sec x 2  Levator stretch 20 sec x 2  No money RTB x15    [facility] 12/14/2022   Visit Number 4   VAS Pain Rating 7   Treadmill Time (in min.) 5   Cervical Stretches - Retraction In Lying 10   Retraction in Sitting -   Sidebending -   Rotation 5   Scapular Retraction 15   Manual Therapy 15   Cervical Extension Seat Pad -   Seat Adjustment -   Top Dead Center -   Counterweight -   Cervical Flexion -   Cervical Extension -   Cervical Peak Torque -   Cervical Weight 87   Repetitions 15   Rating of Perceived Exertion 3   Ice - Z Lie (in min.) 5          Peripheral muscle strengthening which included 1 set of 15-20 repetitions at a slow, controlled 10-13 second per rep pace focused on strengthening supporting musculature for improved body mechanics and functional mobility.  Pt and therapist focused on proper form during treatment to ensure optimal strengthening of each targeted muscle group.  Machines were utilized including torso rotation, leg extension, leg curl, chest press, upright row. Tricep extension, bicep curl, leg press, and hip abduction added visit 3    Chaya received the following manual therapy techniques: Suboccipital release, Manual UT/cervical rotation stretch were applied for 15 minutes.         Home Exercises Provided and Patient Education Provided   Home exercises include: yes, Cervical towel rotation, Scap retractions, Chin tucks  Cardio program: visit 5  Lifting education date: visit 11  Posture/Lumbar roll: discussed    Education provided:   - MedX pacing   -PRE scale    Written Home Exercises Provided: Patient instructed to cont prior HEP.  Exercises were reviewed and Chaya was able to demonstrate them prior to the end of the session.  Chaya demonstrated good  understanding of the education provided.     See  EMR under Patient Instructions for exercises provided prior visit.      Assessment   Chaya arrived to therapy reporting her usual pain levels in her neck. She continues to have trigger points that are most notable in the L suboccipital area, so continued with suboccipital release manual stretching of this area. Medx resistance increased to 87#. She completed 15 reps at a RPE of 3.5/10. She continued to require occasional cues for head placement on the machine. Will continue to progress per patient tolerance and HB protocol.       Anticipated Barriers for therapy: none  Pt's spiritual, cultural and educational needs considered and pt agreeable to plan of care and goals as stated below:         GOALS: Pt is in agreement with the following goals.     Short term goals: 6 weeks or 10 visits   1.  Pt will demonstratte increased cervical ROM as measured by med ex by 8 degrees from initial test which results in improved  ROM of neck for ease with ADLs and driving  (approp and ongoing)  2. Pt will demonstrate independence with reducing or controlling symptoms with ther ex, movement, or position independently, able to reduce pain 1-2 points on pain scale using strategies taught in therapy  (approp and ongoing)  3. Pt will demonstrate increased MedX average isometric strength value by 15% with  when compared to the initial testing resulting in improved ability to perform bending, lifting, and carrying activities safely, confidently.  (approp and ongoing)         Long term goals: 10 weeks or 20 visits  1. Pt will demonstratte increased cervical ROM as measured by med ex by 12 degrees from initial test which results in functional ROM of neck for ease with ADLs and driving  (approp and ongoing)  2. Pt will demonstrate increased MedX average isometric strength value  by 30% from initial test to improve ability to lift and carry, and sustain good posture while performing ADL's  (approp and ongoing)  3.Pt will demonstrate  reduced pain and improved functional outcomes as reported on the FOTO by reaching a limitation score of < or = 30% or less in order to demonstrate subjective improvement in pt's condition.    (approp and ongoing)  4. Pt will demonstrate independence with reducing or controlling symptoms with ther ex, movement, or position independently, able to reduce pain 2-4 points on pain scale using strategies taught in therapy  (approp and ongoing)  5. Pt will demonstrate independence with the HEP at discharge.   (approp and ongoing)  6.  Pt will be able to stand for at least 1 hour without pain to improve her ability to attend concerts. (approp and ongoing)           Plan   Continue with established Plan of Care towards established PT goals.       Therapist: Gume Bunch, PT  12/14/2022

## 2022-12-14 ENCOUNTER — CLINICAL SUPPORT (OUTPATIENT)
Dept: REHABILITATION | Facility: OTHER | Age: 52
End: 2022-12-14
Attending: NURSE PRACTITIONER
Payer: COMMERCIAL

## 2022-12-14 DIAGNOSIS — R29.898 IMPAIRED STRENGTH OF NECK MUSCLES: ICD-10-CM

## 2022-12-14 DIAGNOSIS — R29.3 POOR POSTURE: ICD-10-CM

## 2022-12-14 DIAGNOSIS — R29.898 DECREASED RANGE OF MOTION OF NECK: Primary | ICD-10-CM

## 2022-12-14 PROCEDURE — 97110 THERAPEUTIC EXERCISES: CPT

## 2022-12-16 ENCOUNTER — HOSPITAL ENCOUNTER (OUTPATIENT)
Dept: RADIOLOGY | Facility: HOSPITAL | Age: 52
Discharge: HOME | End: 2022-12-16
Attending: ORTHOPAEDIC SURGERY
Payer: COMMERCIAL

## 2022-12-16 ENCOUNTER — OFFICE VISIT (OUTPATIENT)
Dept: ORTHOPEDICS | Facility: CLINIC | Age: 52
End: 2022-12-16
Payer: COMMERCIAL

## 2022-12-16 ENCOUNTER — CLINICAL SUPPORT (OUTPATIENT)
Dept: REHABILITATION | Facility: OTHER | Age: 52
End: 2022-12-16
Attending: NURSE PRACTITIONER
Payer: COMMERCIAL

## 2022-12-16 VITALS — HEIGHT: 64 IN | WEIGHT: 145.31 LBS | BODY MASS INDEX: 24.81 KG/M2

## 2022-12-16 DIAGNOSIS — M51.36 DDD (DEGENERATIVE DISC DISEASE), LUMBAR: ICD-10-CM

## 2022-12-16 DIAGNOSIS — M54.2 CERVICALGIA: Primary | ICD-10-CM

## 2022-12-16 DIAGNOSIS — R53.1 GENERALIZED WEAKNESS: Primary | ICD-10-CM

## 2022-12-16 DIAGNOSIS — M50.30 DDD (DEGENERATIVE DISC DISEASE), CERVICAL: ICD-10-CM

## 2022-12-16 DIAGNOSIS — M54.9 DORSALGIA, UNSPECIFIED: ICD-10-CM

## 2022-12-16 PROCEDURE — 72050 X-RAY EXAM NECK SPINE 4/5VWS: CPT | Mod: 26,,, | Performed by: RADIOLOGY

## 2022-12-16 PROCEDURE — 99204 OFFICE O/P NEW MOD 45 MIN: CPT | Mod: S$GLB,,, | Performed by: REGISTERED NURSE

## 2022-12-16 PROCEDURE — 72110 X-RAY EXAM L-2 SPINE 4/>VWS: CPT | Mod: TC

## 2022-12-16 PROCEDURE — 97110 THERAPEUTIC EXERCISES: CPT | Mod: CQ

## 2022-12-16 PROCEDURE — 99999 PR PBB SHADOW E&M-EST. PATIENT-LVL III: ICD-10-PCS | Mod: PBBFAC,,, | Performed by: REGISTERED NURSE

## 2022-12-16 PROCEDURE — 99999 PR PBB SHADOW E&M-EST. PATIENT-LVL III: CPT | Mod: PBBFAC,,, | Performed by: REGISTERED NURSE

## 2022-12-16 PROCEDURE — 72110 X-RAY EXAM L-2 SPINE 4/>VWS: CPT | Mod: 26,,, | Performed by: RADIOLOGY

## 2022-12-16 PROCEDURE — 99204 PR OFFICE/OUTPT VISIT, NEW, LEVL IV, 45-59 MIN: ICD-10-PCS | Mod: S$GLB,,, | Performed by: REGISTERED NURSE

## 2022-12-16 PROCEDURE — 72110 XR LUMBAR SPINE AP AND LAT WITH FLEX/EXT: ICD-10-PCS | Mod: 26,,, | Performed by: RADIOLOGY

## 2022-12-16 PROCEDURE — 72050 XR CERVICAL SPINE AP LAT WITH FLEX EXTEN: ICD-10-PCS | Mod: 26,,, | Performed by: RADIOLOGY

## 2022-12-16 PROCEDURE — 72050 X-RAY EXAM NECK SPINE 4/5VWS: CPT | Mod: TC

## 2022-12-16 NOTE — PROGRESS NOTES
[facility]      Name: Chaya Dee  Clinic Number: [id number]    Therapy Diagnosis:   No diagnosis found.      Physician: Tess Haas FNP    Visit Date: 2022    Physician Orders: PT Eval and Treat   Medical Diagnosis from Referral: M54.2 (ICD-10-CM) - Cervicalgia  Evaluation Date: 2022  Authorization Period Expiration: 2023  Plan of Care Expiration: 2023  Reassessment Due: 2022  Visit # / Visits authorized:     Time In: 3:00 PM  Time Out: 4:00 PM  Total Billable Time: 60 minutes  Insurance type:  Fee for service Insurance Patient    Precautions: Standard    Pattern of pain determined: Pattern 1     Subjective   Chaya  reports no new pain or symptoms. Patient reports tolerating previous visit fairly well c/ some soreness.  Patient reports their pain to be 7/10 on a 0-10 scale with 0 being no pain and 10 being the worst pain imaginable.  Pain Location: B cervical, L>R     Occupation:  and   Leisure: go to concerts, go out to dinner   Pts goals: be able to not be in pain, travel, strengthen/gain mobility    Objective     Baseline Isometric Testing on Med X equipment:  Testing administered by PT  Date of testin2022  ROM 42-96 deg   Max Peak Torque 104    Min Peak Torque 79    Flex/Ext Ratio 1.3   % below normative data 52%       Outcomes:  Initial score: 56%  Visit 5 score:  Goal:      Treatment    Pt was instructed in and performed the following:     Chaya received therapeutic exercises to develop/improved posture, cardiovascular endurance, muscular endurance, lumbar/cervical ROM, strength and muscular endurance for 45 minutes including the following exercises:     Cervical towel rotation stretch 5x 5 sec B NP  Scap retractions 15x NP  Chin tucks 10x   UT stretch 20 sec x 2 NP  Levator stretch 20 sec x 2 NP  No money GTB x15  Foam roll supine     [facility] - Short 2022    Visit Number 5   VAS Pain Rating -   Treadmill Time (in min.) 5   Retraction in Lying -   Retraction in Sitting -   Sidebending -   Rotation -   Scapular Retraction -   Manual Therapy -   Cervical Extension - Seat Pad -   Seat Adjustment -   Top Dead Center -   Counterweight -   Cervical Flexion -   Cervical Extension -   Cervical Peak Torque -   Cervical Weight 96   Repetitions 15   Rating of Perceived Exertion 3           Peripheral muscle strengthening which included 1 set of 15-20 repetitions at a slow, controlled 10-13 second per rep pace focused on strengthening supporting musculature for improved body mechanics and functional mobility.  Pt and therapist focused on proper form during treatment to ensure optimal strengthening of each targeted muscle group.  Machines were utilized including torso rotation, leg extension, leg curl, chest press, upright row. Tricep extension, bicep curl, leg press, and hip abduction added visit 3    Chaya received the following manual therapy techniques: Suboccipital release, Manual UT/cervical rotation stretch were applied for 15 minutes.         Home Exercises Provided and Patient Education Provided   Home exercises include: yes, Cervical towel rotation, Scap retractions, Chin tucks  Cardio program: visit 5  Lifting education date: visit 11  Posture/Lumbar roll: discussed    Education provided:   - MedX pacing   -PRE scale    Written Home Exercises Provided: Patient instructed to cont prior HEP.  Exercises were reviewed and Chaya was able to demonstrate them prior to the end of the session.  Chaya demonstrated good  understanding of the education provided.     See EMR under Patient Instructions for exercises provided prior visit.      Assessment   Patient tolerates 5 min of STM to occipital muscles and UT, LS and scalenes with a decrease in tension following. Chaay is progressed to GTB while doing no money which she is able to tolerate well. Patient tolerates  10% increase on medx completing 15 reps with RPE of 3. Patient requires cuing to activate core while performing torso peripheral machine. Progressions will increase per HB protocol.     Anticipated Barriers for therapy: none  Pt's spiritual, cultural and educational needs considered and pt agreeable to plan of care and goals as stated below:         GOALS: Pt is in agreement with the following goals.     Short term goals: 6 weeks or 10 visits   1.  Pt will demonstratte increased cervical ROM as measured by med ex by 8 degrees from initial test which results in improved  ROM of neck for ease with ADLs and driving  (approp and ongoing)  2. Pt will demonstrate independence with reducing or controlling symptoms with ther ex, movement, or position independently, able to reduce pain 1-2 points on pain scale using strategies taught in therapy  (approp and ongoing)  3. Pt will demonstrate increased MedX average isometric strength value by 15% with  when compared to the initial testing resulting in improved ability to perform bending, lifting, and carrying activities safely, confidently.  (approp and ongoing)         Long term goals: 10 weeks or 20 visits  1. Pt will demonstratte increased cervical ROM as measured by med ex by 12 degrees from initial test which results in functional ROM of neck for ease with ADLs and driving  (approp and ongoing)  2. Pt will demonstrate increased MedX average isometric strength value  by 30% from initial test to improve ability to lift and carry, and sustain good posture while performing ADL's  (approp and ongoing)  3.Pt will demonstrate reduced pain and improved functional outcomes as reported on the FOTO by reaching a limitation score of < or = 30% or less in order to demonstrate subjective improvement in pt's condition.    (approp and ongoing)  4. Pt will demonstrate independence with reducing or controlling symptoms with ther ex, movement, or position independently, able to reduce pain  2-4 points on pain scale using strategies taught in therapy  (approp and ongoing)  5. Pt will demonstrate independence with the HEP at discharge.   (approp and ongoing)  6.  Pt will be able to stand for at least 1 hour without pain to improve her ability to attend concerts. (approp and ongoing)           Plan   Continue with established Plan of Care towards established PT goals.       Therapist: Geronimo Herrera, PTA  12/16/2022

## 2022-12-16 NOTE — PROGRESS NOTES
DATE: 12/16/2022  PATIENT: Chaya Dee    Supervising Physician: Mehul Mcgarry M.D.    CHIEF COMPLAINT: left neck and low back pain    HISTORY:  Chaya Dee is a 52 y.o. female here for initial evaluation of low back pain (Back - 6). The pain has been present for years. The patient describes the pain as dull.  The pain is worse with heavy lifting and improved by rest. There is no associated numbness and tingling. There is no subjective weakness. Prior treatments have included tylenol, advil, PT, the healthy back program and the chiropractor, but no PARAG or surgery.    The patient also has complaints of neck pain (Neck - 7).  The pain has been present for years. The patient describes the pain as constant aching in her left neck. The pain is worse with neck rotation to the right and improved by nothing in particular. She states some days are better than others. There is no associated numbness and tingling. There is no subjective weakness. Prior treatments have included tylenol, advil, flexeril, zanaflex, PT, chiropractor and 2 ESIs at [facility], but no surgery.   The patient denies myelopathic symptoms such as handwriting changes or difficulty with buttons/coins/keys. Denies perineal paresthesias, bowel/bladder dysfunction.    PAST MEDICAL/SURGICAL HISTORY:  Past Medical History:   Diagnosis Date    Allergy     Chronic allergic rhinitis 3/19/2016    Endometriosis     Laryngopharyngeal reflux disease 03/2018    PONV (postoperative nausea and vomiting)     Recurrent upper respiratory infection (URI)      Past Surgical History:   Procedure Laterality Date    ABDOMINAL SURGERY      endometrosis    ADENOIDECTOMY      NASAL SEPTUM SURGERY      SHOULDER SURGERY      SINUS SURGERY      TONSILLECTOMY         Medications:   Current Outpatient Medications on File Prior to Visit   Medication Sig Dispense Refill    cholecalciferol, vitamin D3, 125 mcg (5,000 unit) Tab Take by mouth.      cyanocobalamin 1,000 mcg/mL  injection every 14 (fourteen) days.   0    gabapentin (NEURONTIN) 100 MG capsule Take 200 mg by mouth every evening.      rizatriptan (MAXALT-MLT) 10 MG disintegrating tablet Take 1 tablet (10 mg total) by mouth every 2 (two) hours as needed for Migraine. Max 30 mg/day. 12 tablet 5    tiZANidine (ZANAFLEX) 2 MG tablet Take 1-2 tabs around bedtime. No alcohol, no driving with medication. 60 tablet 5    topiramate (TOPAMAX) 50 MG tablet Take 1 tablet (50 mg total) by mouth 2 (two) times daily. 60 tablet 5    celecoxib (CELEBREX) 200 MG capsule Take 200 mg by mouth as needed for Pain.      cyclobenzaprine (FLEXERIL) 5 MG tablet Take 5 mg by mouth 2 (two) times daily as needed.      famotidine (PEPCID) 40 MG tablet       hydroxychloroquine (PLAQUENIL) 200 mg tablet Take 200 mg by mouth once daily.      pantoprazole sodium (PANTOPRAZOLE ORAL) Take 40 mg by mouth before evening meal.       No current facility-administered medications on file prior to visit.       Social History:   Social History     Socioeconomic History    Marital status:    Tobacco Use    Smoking status: Never    Smokeless tobacco: Never   Substance and Sexual Activity    Alcohol use: Yes     Alcohol/week: 2.0 standard drinks     Types: 2 Glasses of wine per week     Comment: socially    Drug use: No    Sexual activity: Not Currently     Partners: Male     Birth control/protection: Other-see comments, None     Comment: No longer taking.  In menopause.       REVIEW OF SYSTEMS:  Constitution: Negative. Negative for chills, fever and night sweats.   Cardiovascular: Negative for chest pain and syncope.   Respiratory: Negative for cough and shortness of breath.   Gastrointestinal: See HPI. Negative for nausea/vomiting. Negative for abdominal pain.  Genitourinary: See HPI. Negative for discoloration or dysuria.  Skin: Negative for dry skin, itching and rash.   Hematologic/Lymphatic: Negative for bleeding problem. Does not bruise/bleed easily.  "  Musculoskeletal: Negative for falls and muscle weakness.   Neurological: See HPI. No seizures.   Endocrine: Negative for polydipsia, polyphagia and polyuria.   Allergic/Immunologic: Negative for hives and persistent infections.     EXAM:  Ht 5' 4" (1.626 m)   Wt 65.9 kg (145 lb 4.5 oz)   BMI 24.94 kg/m²     PHYSICAL EXAMINATION:    General: The patient is a very pleasant 52 y.o. female in no apparent distress, the patient is oriented to person, place and time.  Psych: Normal mood and affect  HEENT: Vision grossly intact, hearing intact to the spoken word.  Lungs: Respirations unlabored.  Gait: Normal station and gait, no difficulty with toe or heel walk.   Skin: Cervical skin and dorsal lumbar skin negative for rashes, lesions, hairy patches and surgical scars.    Range of motion: Cervical and lumbar range of motion is acceptable. There is mild tenderness to palpation of the paracervical muscles.  There is mild lumbar tenderness to palpation.  Spinal Balance: Global saggital and coronal spinal balance acceptable, no significant for scoliosis and kyphosis.  Musculoskeletal: No pain with the range of motion of the bilateral shoulders and elbows. Normal bulk and contour of the bilateral hands.  No pain with the range of motion of the bilateral hips. Mild bilateral trochanteric tenderness to palpation.  Vascular: Bilateral upper and lower extremities warm and well perfused, radial pulses 2+ bilaterally, dorsalis pedis pulses 2+ bilaterally.  Neurological: decreased strength and tone in all major motor groups in the bilateral upper and lower extremities. Normal sensation to light touch in the C5-T1 and L2-S1 dermatomes bilaterally.  Deep tendon reflexes symmetric 2+ in the bilateral upper and lower extremities.  Negative Inverted Radial Reflex and Saul's bilaterally. Negative Babinski bilaterally. Positive straight leg raise bilaterally.     IMAGING:   Today I personally reviewed AP, Lat and Flex/Ex  upright " C-spine films that demonstrate straightening of the lordotic curve. Mild DDD at C5/6.     AP, Lat and Flex/Ex upright lumbar spine films demonstrate no significant degenerative changes.     Body mass index is 24.94 kg/m².    No results found for: HGBA1C      ASSESSMENT/PLAN:    Chaya was seen today for low-back pain, knee pain and neck pain.    Diagnoses and all orders for this visit:    Cervicalgia  -     MRI Cervical Spine Without Contrast; Future    Dorsalgia, unspecified  -     MRI Lumbar Spine Without Contrast; Future      Today we discussed at length all of the different treatment options including anti-inflammatories, acetaminophen, rest, ice, heat, physical therapy including strengthening and stretching exercises, home exercises, ROM, aerobic conditioning, aqua therapy, other modalities including ultrasound, massage, and dry needling, epidural steroid injections and finally surgical intervention.    Cervical and lumbar MRIs ordered, she will follow up in the clinic for results.   I provided the patient with a home exercise program. It is the [facility] spine conditioning program. Exercises include head rolls, kneeling back extension, sitting rotation stretch, modified seated side straddle, knee to chest, bird dog, plank, modified seated plank, hip bridges, abdominal bracing, and abdominal crunch. Pt will complete each exercise 5 times daily for 6-8 weeks.

## 2022-12-20 NOTE — PROGRESS NOTES
[facility] Back Physical Therapy Treatment      Name: Chaya Dee  Clinic Number: [id number]    Therapy Diagnosis:   Encounter Diagnoses   Name Primary?    Decreased range of motion of neck Yes    Impaired strength of neck muscles     Poor posture          Physician: Tess Haas FNP    Visit Date: 2022    Physician Orders: PT Eval and Treat   Medical Diagnosis from Referral: M54.2 (ICD-10-CM) - Cervicalgia  Evaluation Date: 2022  Authorization Period Expiration: 2023  Plan of Care Expiration: 2023  Reassessment Due: 2022  Visit # / Visits authorized:     Time In: 9:30 PM  Time Out: 10:30 PM  Total Billable Time: 60 minutes  Insurance type:  Fee for service Insurance Patient    Precautions: Standard    Pattern of pain determined: Pattern 1     Subjective   Chaya  reports she is still having the pain in her neck and trouble turning her head to the L.  Patient reports their pain to be 6/10 on a 0-10 scale with 0 being no pain and 10 being the worst pain imaginable.  Pain Location: B cervical, L>R     Occupation:  and   Leisure: go to concerts, go out to dinner   Pts goals: be able to not be in pain, travel, strengthen/gain mobility    Objective     Baseline Isometric Testing on Med X equipment:  Testing administered by PT  Date of testin2022  ROM 42-96 deg   Max Peak Torque 104    Min Peak Torque 79    Flex/Ext Ratio 1.3   % below normative data 52%       Outcomes:  Initial score: 56%  Visit 5 score:  Goal:      Treatment    Pt was instructed in and performed the following:     Chaya received therapeutic exercises to develop/improved posture, cardiovascular endurance, muscular endurance, lumbar/cervical ROM, strength and muscular endurance for 45 minutes including the following exercises:     Cervical towel rotation stretch 10x 5 sec B   Scap retractions 15x NP  Chin tucks supine 10x   UT stretch 20 sec x 2  NP  Levator stretch 20 sec x 2 NP  No money GTB x20  Foam roll supine NP  Rows 10x GTB    [facility] 12/21/2022   Visit Number 6   VAS Pain Rating 6   Treadmill Time (in min.) 5   Cervical Stretches - Retraction In Lying 10   Retraction in Sitting -   Sidebending -   Rotation 10   Scapular Retraction 20   Manual Therapy 12   Cervical Extension Seat Pad -   Seat Adjustment -   Top Dead Center -   Counterweight -   Cervical Flexion -   Cervical Extension -   Cervical Peak Torque -   Cervical Weight 96   Repetitions 20   Rating of Perceived Exertion 4   Ice - Z Lie (in min.) 5              Peripheral muscle strengthening which included 1 set of 15-20 repetitions at a slow, controlled 10-13 second per rep pace focused on strengthening supporting musculature for improved body mechanics and functional mobility.  Pt and therapist focused on proper form during treatment to ensure optimal strengthening of each targeted muscle group.  Machines were utilized including torso rotation, leg extension, leg curl, chest press, upright row. Tricep extension, bicep curl, leg press, and hip abduction added visit 3    Chaya received the following manual therapy techniques: Suboccipital release, Manual UT/cervical rotation stretch were applied for 12 minutes.   +P/A cervical glides  +multifidus isometric for cervical facet entrapment        Home Exercises Provided and Patient Education Provided   Home exercises include: yes, Cervical towel rotation, Scap retractions, Chin tucks  Cardio program: visit 5  Lifting education date: visit 11  Posture/Lumbar roll: discussed    Education provided:   - MedX pacing   -PRE scale    Written Home Exercises Provided: Patient instructed to cont prior HEP.  Exercises were reviewed and Chaya was able to demonstrate them prior to the end of the session.  Chaya demonstrated good  understanding of the education provided.     See EMR under Patient Instructions for exercises provided  prior visit.      Assessment   Patient arrived to therapy reporting her usual levels of pain in her neck. She continues to have limitations in cervical rotation and sidebending to the L. Attempted cervical multifidus isometric to ease facet entrapment in her neck and pt immediately had a larger ROM and reported less pressure in neck. Educated patient on how to perform this at home if the pressure with neck turning returns. Resistance on lumbar Medx completing 20 reps with RPE of 4. Progressions will increase per HB protocol.     Anticipated Barriers for therapy: none  Pt's spiritual, cultural and educational needs considered and pt agreeable to plan of care and goals as stated below:         GOALS: Pt is in agreement with the following goals.     Short term goals: 6 weeks or 10 visits   1.  Pt will demonstratte increased cervical ROM as measured by med ex by 8 degrees from initial test which results in improved  ROM of neck for ease with ADLs and driving  (approp and ongoing)  2. Pt will demonstrate independence with reducing or controlling symptoms with ther ex, movement, or position independently, able to reduce pain 1-2 points on pain scale using strategies taught in therapy  (approp and ongoing)  3. Pt will demonstrate increased MedX average isometric strength value by 15% with  when compared to the initial testing resulting in improved ability to perform bending, lifting, and carrying activities safely, confidently.  (approp and ongoing)         Long term goals: 10 weeks or 20 visits  1. Pt will demonstratte increased cervical ROM as measured by med ex by 12 degrees from initial test which results in functional ROM of neck for ease with ADLs and driving  (approp and ongoing)  2. Pt will demonstrate increased MedX average isometric strength value  by 30% from initial test to improve ability to lift and carry, and sustain good posture while performing ADL's  (approp and ongoing)  3.Pt will demonstrate reduced  pain and improved functional outcomes as reported on the FOTO by reaching a limitation score of < or = 30% or less in order to demonstrate subjective improvement in pt's condition.    (approp and ongoing)  4. Pt will demonstrate independence with reducing or controlling symptoms with ther ex, movement, or position independently, able to reduce pain 2-4 points on pain scale using strategies taught in therapy  (approp and ongoing)  5. Pt will demonstrate independence with the HEP at discharge.   (approp and ongoing)  6.  Pt will be able to stand for at least 1 hour without pain to improve her ability to attend concerts. (approp and ongoing)           Plan   Continue with established Plan of Care towards established PT goals.       Therapist: Gume Bunch, PT  12/21/2022

## 2022-12-21 ENCOUNTER — CLINICAL SUPPORT (OUTPATIENT)
Dept: REHABILITATION | Facility: OTHER | Age: 52
End: 2022-12-21
Attending: NURSE PRACTITIONER
Payer: COMMERCIAL

## 2022-12-21 DIAGNOSIS — R29.3 POOR POSTURE: ICD-10-CM

## 2022-12-21 DIAGNOSIS — R29.898 IMPAIRED STRENGTH OF NECK MUSCLES: ICD-10-CM

## 2022-12-21 DIAGNOSIS — R29.898 DECREASED RANGE OF MOTION OF NECK: Primary | ICD-10-CM

## 2022-12-21 PROCEDURE — 97110 THERAPEUTIC EXERCISES: CPT

## 2022-12-21 PROCEDURE — 97140 MANUAL THERAPY 1/> REGIONS: CPT

## 2022-12-22 ENCOUNTER — PATIENT MESSAGE (OUTPATIENT)
Dept: ORTHOPEDICS | Facility: CLINIC | Age: 52
End: 2022-12-22
Payer: COMMERCIAL

## 2022-12-27 ENCOUNTER — HOSPITAL ENCOUNTER (OUTPATIENT)
Dept: RADIOLOGY | Facility: OTHER | Age: 52
Discharge: HOME | End: 2022-12-27
Attending: REGISTERED NURSE
Payer: COMMERCIAL

## 2022-12-27 ENCOUNTER — CLINICAL SUPPORT (OUTPATIENT)
Dept: REHABILITATION | Facility: OTHER | Age: 52
End: 2022-12-27
Attending: NURSE PRACTITIONER
Payer: COMMERCIAL

## 2022-12-27 DIAGNOSIS — R29.898 IMPAIRED STRENGTH OF NECK MUSCLES: ICD-10-CM

## 2022-12-27 DIAGNOSIS — R29.3 POOR POSTURE: ICD-10-CM

## 2022-12-27 DIAGNOSIS — M54.2 CERVICALGIA: ICD-10-CM

## 2022-12-27 DIAGNOSIS — M54.9 DORSALGIA, UNSPECIFIED: ICD-10-CM

## 2022-12-27 DIAGNOSIS — R29.898 DECREASED RANGE OF MOTION OF NECK: Primary | ICD-10-CM

## 2022-12-27 PROCEDURE — 97110 THERAPEUTIC EXERCISES: CPT | Mod: CQ

## 2022-12-27 PROCEDURE — 72141 MRI CERVICAL SPINE WITHOUT CONTRAST: ICD-10-PCS | Mod: 26,,, | Performed by: RADIOLOGY

## 2022-12-27 PROCEDURE — 72148 MRI LUMBAR SPINE WITHOUT CONTRAST: ICD-10-PCS | Mod: 26,,, | Performed by: RADIOLOGY

## 2022-12-27 PROCEDURE — 72141 MRI NECK SPINE W/O DYE: CPT | Mod: TC

## 2022-12-27 PROCEDURE — 72141 MRI NECK SPINE W/O DYE: CPT | Mod: 26,,, | Performed by: RADIOLOGY

## 2022-12-27 PROCEDURE — 72148 MRI LUMBAR SPINE W/O DYE: CPT | Mod: TC

## 2022-12-27 PROCEDURE — 72148 MRI LUMBAR SPINE W/O DYE: CPT | Mod: 26,,, | Performed by: RADIOLOGY

## 2022-12-27 NOTE — PROGRESS NOTES
[facility] Back Physical Therapy Treatment      Name: Chaya Dee  Clinic Number: [id number]    Therapy Diagnosis:   Encounter Diagnoses   Name Primary?    Decreased range of motion of neck Yes    Impaired strength of neck muscles     Poor posture        Physician: Tess Haas FNP    Visit Date: 2022    Physician Orders: PT Eval and Treat   Medical Diagnosis from Referral: M54.2 (ICD-10-CM) - Cervicalgia  Evaluation Date: 2022  Authorization Period Expiration: 2023  Plan of Care Expiration: 2023  Reassessment Due: 2022  Visit # / Visits authorized:     Time In: 3:00PM  Time Out: 4:00PM  Total Billable Time: 60 minutes  Insurance type:  Fee for service Insurance Patient    Precautions: Standard    Pattern of pain determined: Pattern 1     Subjective   Chaya  reports having difficulty lifting her left shoulder yesterday due to L shoulder/neck pain. Pain decreased for about 2 hours after session.  MRI tonight.   Patient reports their pain to be 6/10 on a 0-10 scale with 0 being no pain and 10 being the worst pain imaginable.  Pain Location: B cervical, L>R     Occupation:  and   Leisure: go to concerts, go out to dinner   Pts goals: be able to not be in pain, travel, strengthen/gain mobility    Objective     Baseline Isometric Testing on Med X equipment:  Testing administered by PT  Date of testin2022  ROM 42-96 deg   Max Peak Torque 104    Min Peak Torque 79    Flex/Ext Ratio 1.3   % below normative data 52%       Outcomes:  Initial score: 56%  Visit 5 score:  Goal:      Treatment    Pt was instructed in and performed the following:     Chaya received therapeutic exercises to develop/improved posture, cardiovascular endurance, muscular endurance, lumbar/cervical ROM, strength and muscular endurance for 45 minutes including the following exercises:     Cervical towel rotation stretch 10x 5 sec B  NP  Scap retractions  "15x NP  Chin tucks seated 10x 5"  UT stretch 20 sec x 2 NP  Levator stretch 20 sec x 2 NP  No money GTB x20 NP  Foam roll supine NP  Rows 10x GTB NP  +Prone W x15,3" T x15,3" modified daniel Y x10  +Seated thoracic ext over red FR (lb substitution)  +Standing open books (felt only in LB)  +Chin tucks w/TB (unable this visit due to increased pain with shoulder flexion)    [facility] - Short 12/27/2022   Visit Number 7   VAS Pain Rating 6   Treadmill Time (in min.) 5   Retraction in Lying 10   Retraction in Sitting -   Sidebending -   Rotation -   Scapular Retraction -   Manual Therapy -   Cervical Extension - Seat Pad -   Seat Adjustment -   Top Dead Center -   Counterweight -   Cervical Flexion -   Cervical Extension -   Cervical Peak Torque -   Cervical Weight 102   Repetitions 15   Rating of Perceived Exertion 4                   Peripheral muscle strengthening which included 1 set of 15-20 repetitions at a slow, controlled 10-13 second per rep pace focused on strengthening supporting musculature for improved body mechanics and functional mobility.  Pt and therapist focused on proper form during treatment to ensure optimal strengthening of each targeted muscle group.  Machines were utilized including torso rotation, leg extension, leg curl, chest press, upright row. Tricep extension, bicep curl, leg press, and hip abduction added visit 3    Chaya received the following manual therapy techniques: Suboccipital release, UT/LS/scalenes/SCM were applied for 10 minutes.     P/A cervical glides  multifidus isometric for cervical facet entrapment        Home Exercises Provided and Patient Education Provided   Home exercises include: yes, Cervical towel rotation, Scap retractions, Chin tucks  Cardio program: visit 5  Lifting education date: visit 11  Posture/Lumbar roll: discussed    Education provided:   - MedX pacing   -PRE scale    Written Home Exercises Provided: Patient instructed to cont prior " HEP.  Exercises were reviewed and Chaya was able to demonstrate them prior to the end of the session.  Chaya demonstrated good  understanding of the education provided.     See EMR under Patient Instructions for exercises provided prior visit.      Assessment   Patient returned to PT discouraged because pain only decreases temporarily after stretching or treatment. Discussed posture, muscular imbalances, sleeping positions,stress holding patterns and progressed periscapular strengthening to address significant weakness. Encouraged patient that it takes time to gain strength. Unable to target thoracic spine this visit due to significant stiffness and l/s substitutions. Patient able to perform 20 reps on c/s medx machine with an RPE of 3/10.Progressions will increase per HB protocol.     Anticipated Barriers for therapy: none  Pt's spiritual, cultural and educational needs considered and pt agreeable to plan of care and goals as stated below:         GOALS: Pt is in agreement with the following goals.     Short term goals: 6 weeks or 10 visits   1.  Pt will demonstratte increased cervical ROM as measured by med ex by 8 degrees from initial test which results in improved  ROM of neck for ease with ADLs and driving  (approp and ongoing)  2. Pt will demonstrate independence with reducing or controlling symptoms with ther ex, movement, or position independently, able to reduce pain 1-2 points on pain scale using strategies taught in therapy  (approp and ongoing)  3. Pt will demonstrate increased MedX average isometric strength value by 15% with  when compared to the initial testing resulting in improved ability to perform bending, lifting, and carrying activities safely, confidently.  (approp and ongoing)         Long term goals: 10 weeks or 20 visits  1. Pt will demonstratte increased cervical ROM as measured by med ex by 12 degrees from initial test which results in functional ROM of neck for ease with ADLs and  driving  (approp and ongoing)  2. Pt will demonstrate increased MedX average isometric strength value  by 30% from initial test to improve ability to lift and carry, and sustain good posture while performing ADL's  (approp and ongoing)  3.Pt will demonstrate reduced pain and improved functional outcomes as reported on the FOTO by reaching a limitation score of < or = 30% or less in order to demonstrate subjective improvement in pt's condition.    (approp and ongoing)  4. Pt will demonstrate independence with reducing or controlling symptoms with ther ex, movement, or position independently, able to reduce pain 2-4 points on pain scale using strategies taught in therapy  (approp and ongoing)  5. Pt will demonstrate independence with the HEP at discharge.   (approp and ongoing)  6.  Pt will be able to stand for at least 1 hour without pain to improve her ability to attend concerts. (approp and ongoing)           Plan   Continue with established Plan of Care towards established PT goals.       Therapist: Stephanie Warren, PTA  12/27/2022

## 2022-12-28 ENCOUNTER — OFFICE VISIT (OUTPATIENT)
Dept: NEUROLOGY | Facility: CLINIC | Age: 52
End: 2022-12-28
Payer: COMMERCIAL

## 2022-12-28 VITALS — DIASTOLIC BLOOD PRESSURE: 72 MMHG | HEART RATE: 80 BPM | SYSTOLIC BLOOD PRESSURE: 111 MMHG

## 2022-12-28 DIAGNOSIS — G43.709 CHRONIC MIGRAINE WITHOUT AURA WITHOUT STATUS MIGRAINOSUS, NOT INTRACTABLE: Primary | ICD-10-CM

## 2022-12-28 DIAGNOSIS — M54.2 CERVICALGIA: ICD-10-CM

## 2022-12-28 DIAGNOSIS — G44.86 CERVICOGENIC HEADACHE: ICD-10-CM

## 2022-12-28 DIAGNOSIS — G43.019 MIGRAINE WITHOUT AURA, INTRACTABLE, WITHOUT STATUS MIGRAINOSUS: ICD-10-CM

## 2022-12-28 DIAGNOSIS — G43.009 MIGRAINE WITHOUT AURA AND WITHOUT STATUS MIGRAINOSUS, NOT INTRACTABLE: ICD-10-CM

## 2022-12-28 PROCEDURE — 99214 PR OFFICE/OUTPT VISIT, EST, LEVL IV, 30-39 MIN: ICD-10-PCS | Mod: S$GLB,,, | Performed by: NURSE PRACTITIONER

## 2022-12-28 PROCEDURE — 99214 OFFICE O/P EST MOD 30 MIN: CPT | Mod: S$GLB,,, | Performed by: NURSE PRACTITIONER

## 2022-12-28 PROCEDURE — 99999 PR PBB SHADOW E&M-EST. PATIENT-LVL III: ICD-10-PCS | Mod: PBBFAC,,, | Performed by: NURSE PRACTITIONER

## 2022-12-28 PROCEDURE — 99999 PR PBB SHADOW E&M-EST. PATIENT-LVL III: CPT | Mod: PBBFAC,,, | Performed by: NURSE PRACTITIONER

## 2022-12-28 RX ORDER — BACLOFEN 10 MG/1
10 TABLET ORAL EVERY 8 HOURS PRN
Qty: 90 TABLET | Refills: 2 | Status: SHIPPED | OUTPATIENT
Start: 2022-12-28 | End: 2023-03-23

## 2022-12-28 RX ORDER — TOPIRAMATE 50 MG/1
50 TABLET, FILM COATED ORAL 2 TIMES DAILY
Qty: 180 TABLET | Refills: 1 | Status: SHIPPED | OUTPATIENT
Start: 2022-12-28 | End: 2023-03-10 | Stop reason: SDUPTHER

## 2022-12-28 NOTE — PROGRESS NOTES
Established Patient   SUBJECTIVE:  Patient ID: Chaya Dee   Chief Complaint: Follow-up    History of Present Illness:  Chaya Dee is a 52 y.o. female who presents to clinic alone for follow-up of headaches.     Recommendations made at last Office Visit on 9/21/22:  - Discussed symptoms appear to be consistent with migraine without aura complicated by neck pain, back pain, insomnia, increased stress, discussed treatment options and patient agreed with the following plan:  - For migraine prevention - will increase topiramate to 50 mg bid   - Start tizanidine 2-4 ritika nightly - likely to benefit sleep, neck pain and headache prevention   - Continue gabapentin 200 mg nightly   - Could consider increasing gabapentin in the future   - Referral to physical therapy for neck pain    - Continue tracking headaches   - CMP, TSH ordered   - referral placed to healthy back program   - RTC in 2 months or sooner if needed     12/28/2022 - Interval History:  She has continued suffering with near daily headaches which are present all day, everyday.  Having two different types of headaches, one of which is throbbing, one sided.  Feels headaches are manageable at this point and not interfering with her ability to continue day to day activities.  She is currently taking topiramate 50 mg bid for headache prevention, she does feel this has helped to decrease the intensity of her headaches.  Taking tizanidine 2-4 mg only on occasion.  Has tried flexeril in the past which she did not feel provided her with any relief.  She is enrolled in healthy back program, has been going to physical therapy routinely without any noticeable improvement.   Continues to feel her neck is playing a large role in her headaches, will do stretches recommended by PT which helps for a short while before she feels her neck/upper back muscles begin to start tightening back up.     Otherwise, information below is still accurate and current.      09/21/2022 - Interval History:  She again attempted to taper topiramate beginning the day after her last visit, noticed she began to experience more frequent headaches and migraines became more intense so went back to taking 50 mg nightly (has been back on 50 mg nightly dose for over 2 months now).    Currently suffering with a headache on average 3 days per week, the majority of which come on in the afternoon, before dinnertime, maybe around 2 pm.  If she treats with maxalt 10 mg mlt migraine will last a couple of hours, otherwise will last the remainder of the day, occasionally will carry into the following day.    Headaches began to occur more frequently around July.    Stops drinking caffeine around 10:30 am, drinks at least 2 cups of coffee before this.       Sleep is poor - has trouble falling asleep and staying asleep. On average sleeps 4 hours per night. Does not feel rested upon waking.  Once she gets into the bed feels her mind will start to race, has a hard time turning her mind off.      Otherwise, information below is still accurate and current.      06/20/2022 - Interval History:  Tapered topiramate to 25 mg nightly which she had been taking nightly from end of March until a few weeks ago.  Noticed she was starting to experience more frequent migraines and subsequently decided to increase topiramate back to 50 mg nightly.  Since then, migraines have been back under control.  She continues to treat acute migraines with rizatriptan.  She would like to eventually get off topiramate.       Otherwise, information below is still accurate and current.      03/30/2022 - Interval History:  Migraines have been much improved since starting topiramate.  Currently suffering with only one migraine per month each of which lasts about 1 day.  She is taking topiramate 50 mg nightly for migraine prevention.  Only rarely takes maxalt for acute migraines.  She is very pleased with the improvement in her migraines.   "Would like to attempt to taper off topiramate.       Otherwise, information below is still accurate and current.      History of Present Illness:   51 y.o. female with chronic allergic rhinitis, headaches and rheumatoid arthritis, who presents to clinic alone for evaluation of headaches.  Long history of sinus issues, with chronic daily headaches/facial pain.  Reports once per week she experiences unbearable pain which persists all day in duration.  Headaches are described as a moderate to severe, pressure and pulsating pain "like I'm wearing pain goggles" located bilateral cheeks, forehead, and temporalis bilaterally.  Headaches can last anywhere from an hour up to all day in duration.   Pain can be rated anywhere from 3 to 8 out of 10, intensifying to peak over more than an hour.  Made worse by physical movement.  Associated symptoms include congestion, photophobia, nausea (only "once in a blue moon").  She does have a history of migraines in her past, which were hormonally driven, migraines resolved after she completed hormone therapy.  She has not been able to identify any trigger.  On days when headaches are worse, she is able to continue working however finds it more difficult to concentrate.       Treatments Tried and Response  Sumatriptan   Amitriptyline -   Gabapentin 200 mg qhs - for RA  celebrex - for RA  topiramate - helping   maxalt 10 mg mlt - helps   Baclofen - given today   Emgality - given today     Current Medications:    cholecalciferol, vitamin D3, 125 mcg (5,000 unit) Tab, Take by mouth., Disp: , Rfl:     famotidine (PEPCID) 40 MG tablet, , Disp: , Rfl:     gabapentin (NEURONTIN) 100 MG capsule, Take 200 mg by mouth every evening., Disp: , Rfl:     rizatriptan (MAXALT-MLT) 10 MG disintegrating tablet, Take 1 tablet (10 mg total) by mouth every 2 (two) hours as needed for Migraine. Max 30 mg/day., Disp: 12 tablet, Rfl: 5    tiZANidine (ZANAFLEX) 2 MG tablet, Take 1-2 tabs around bedtime. No " alcohol, no driving with medication., Disp: 60 tablet, Rfl: 5    baclofen (LIORESAL) 10 MG tablet, Take 1 tablet (10 mg total) by mouth every 8 (eight) hours as needed (muscle spasm)., Disp: 90 tablet, Rfl: 2    cyanocobalamin 1,000 mcg/mL injection, every 14 (fourteen) days. , Disp: , Rfl: 0    galcanezumab-gnlm 120 mg/mL PnIj, Inject 120 mg into the skin every 28 days. Begin 28 days after loading dose., Disp: 1 each, Rfl: 10    galcanezumab-gnlm 120 mg/mL PnIj, Inject 240 mg into the skin once. for 1 dose, Disp: 2 mL, Rfl: 0    pantoprazole sodium (PANTOPRAZOLE ORAL), Take 40 mg by mouth before evening meal., Disp: , Rfl:     topiramate (TOPAMAX) 50 MG tablet, Take 1 tablet (50 mg total) by mouth 2 (two) times daily., Disp: 180 tablet, Rfl: 1    Review of Systems - A review of 10+ systems was conducted with pertinent positive and negative findings documented in HPI with all other systems reviewed and negative.    PFSH: Past medical, family, and social history reviewed as documented in chart with pertinent positive medical, family, and social history detailed in HPI.    OBJECTIVE:  Vitals:  /72   Pulse 80      Physical Exam:  Constitutional: she appears well-developed and well-nourished. she is well groomed. NAD    Review of Data:   Notes from orthopedics, physical therapy reviewed   Labs:  Lab Visit on 09/30/2022   Component Date Value Ref Range Status    Transferrin 09/30/2022 300  200 - 375 mg/dL Final    Iron 09/30/2022 87  30 - 160 ug/dL Final    Transferrin 09/30/2022 300  200 - 375 mg/dL Final    TIBC 09/30/2022 444  250 - 450 ug/dL Final    Saturated Iron 09/30/2022 20  20 - 50 % Final    Hepatitis C Ab 09/30/2022 Non-reactive  Non-reactive Final    Hepatitis B Surface Ag 09/30/2022 Non-reactive  Non-reactive Final    Hep B Core Total Ab 09/30/2022 Non-reactive  Non-reactive Final    Hep. B Surf Ab, Qual 09/30/2022 Negative   Final    Hep. B Surf Ab, Quant. 09/30/2022 <3  mIU/mL Final   Lab Visit on  09/21/2022   Component Date Value Ref Range Status    TSH 09/21/2022 0.695  0.400 - 4.000 uIU/mL Final    Sodium 09/21/2022 139  136 - 145 mmol/L Final    Potassium 09/21/2022 4.3  3.5 - 5.1 mmol/L Final    Chloride 09/21/2022 106  95 - 110 mmol/L Final    CO2 09/21/2022 26  23 - 29 mmol/L Final    Glucose 09/21/2022 93  70 - 110 mg/dL Final    BUN 09/21/2022 17  6 - 20 mg/dL Final    Creatinine 09/21/2022 0.9  0.5 - 1.4 mg/dL Final    Calcium 09/21/2022 9.7  8.7 - 10.5 mg/dL Final    Total Protein 09/21/2022 6.9  6.0 - 8.4 g/dL Final    Albumin 09/21/2022 4.4  3.5 - 5.2 g/dL Final    Total Bilirubin 09/21/2022 1.3 (H)  0.1 - 1.0 mg/dL Final    Alkaline Phosphatase 09/21/2022 93  55 - 135 U/L Final    AST 09/21/2022 54 (H)  10 - 40 U/L Final    ALT 09/21/2022 75 (H)  10 - 44 U/L Final    Anion Gap 09/21/2022 7 (L)  8 - 16 mmol/L Final    eGFR 09/21/2022 >60.0  >60 mL/min/1.73 m^2 Final     Imaging:  No results found for this or any previous visit.  Note: I have independently reviewed any/all imaging/labs/tests and agree with the report (s) as documented.  Any discrepancies will be as noted/demarcated by free text.  MARTINEZ SMITH 12/28/2022    ASSESSMENT:  1. Chronic migraine without aura without status migrainosus, not intractable    2. Migraine without aura, intractable, without status migrainosus    3. Cervicogenic headache    4. Migraine without aura and without status migrainosus, not intractable    5. Cervicalgia      PLAN:  - Start Emgality 240 mg SQ loading dose followed by 120 mg SQ monthly injections.   - Continue topiramate 50 mg bid   - Trial baclofen 5-10 mg morning and afternoon, could continue tizanidine nightly   - Future considerations - botox, compounded topical cream   - For acute migraines - maxalt 10 mg mlt    - Risks, benefits, and potential side effects of emgality discussed  - Alternative treatment options offered   - Continue regular f/up with PT and orthopedics for management of neck/back pain    - RTC in 2 months or sooner if needed     Orders Placed This Encounter    galcanezumab-gnlm 120 mg/mL PnIj    galcanezumab-gnlm 120 mg/mL PnIj    baclofen (LIORESAL) 10 MG tablet    topiramate (TOPAMAX) 50 MG tablet     Questions and concerns were sought and answered to the patient's stated verbal satisfaction.  The patient verbalizes understanding and agreement with the above stated treatment plan.     CC: ESPERANZA Albright, FNP-C  [facility]   [phone]    Dr. Rashid was available during today's encounter.

## 2022-12-29 ENCOUNTER — CLINICAL SUPPORT (OUTPATIENT)
Dept: REHABILITATION | Facility: OTHER | Age: 52
End: 2022-12-29
Attending: NURSE PRACTITIONER
Payer: COMMERCIAL

## 2022-12-29 DIAGNOSIS — R29.898 DECREASED RANGE OF MOTION OF NECK: Primary | ICD-10-CM

## 2022-12-29 DIAGNOSIS — R29.898 IMPAIRED STRENGTH OF NECK MUSCLES: ICD-10-CM

## 2022-12-29 DIAGNOSIS — R29.3 POOR POSTURE: ICD-10-CM

## 2022-12-29 PROCEDURE — 97110 THERAPEUTIC EXERCISES: CPT | Mod: CQ

## 2022-12-29 PROCEDURE — 97140 MANUAL THERAPY 1/> REGIONS: CPT | Mod: CQ

## 2022-12-29 NOTE — PROGRESS NOTES
[facility] Back Physical Therapy Treatment      Name: Chaya Dee  Clinic Number: [id number]    Therapy Diagnosis:   Encounter Diagnoses   Name Primary?    Decreased range of motion of neck Yes    Impaired strength of neck muscles     Poor posture        Physician: Tess Haas FNP    Visit Date: 2022    Physician Orders: PT Eval and Treat   Medical Diagnosis from Referral: M54.2 (ICD-10-CM) - Cervicalgia  Evaluation Date: 2022  Authorization Period Expiration: 2023  Plan of Care Expiration: 2023  Reassessment Due: 2022  Visit # / Visits authorized:     Time In: 10:30 AM  Time Out: 11:30 AM  Total Billable Time: 55 minutes  Insurance type:  Fee for service Insurance Patient    Precautions: Standard    Pattern of pain determined: Pattern 1     Subjective   Chaya  reports slight decrease in cervical pain levels since last visit.  She reports she continues to experience mod increase in residual muscle soreness following each visit that lasts for a day or two.    Patient reports their pain to be 4-5/10 on a 0-10 scale with 0 being no pain and 10 being the worst pain imaginable.  Pain Location: B cervical, L>R     Occupation:  and   Leisure: go to concerts, go out to dinner   Pts goals: be able to not be in pain, travel, strengthen/gain mobility    Objective     Baseline Isometric Testing on Med X equipment:  Testing administered by PT  Date of testin2022  ROM 42-96 deg   Max Peak Torque 104    Min Peak Torque 79    Flex/Ext Ratio 1.3   % below normative data 52%       Outcomes:  Initial score: 56%  Visit 5 score:  Goal:      Treatment    Pt was instructed in and performed the following:     Chaya received therapeutic exercises to develop/improved posture, cardiovascular endurance, muscular endurance, lumbar/cervical ROM, strength and muscular endurance for 45 minutes including the following exercises:     UT stretch 20  "sec x 3  Levator stretch 20 sec x 3  Chin tucks seated 15 x 5"  Scap retractions 15x   No money GTB x20   Prone W x15,3" T x15,3"  Y x15  +Seated thoracic ext over red FR (lb substitution)  +Standing open books (felt only in LB)       Not performed 12/29/2022 :  Cervical towel rotation stretch 10x 5 sec B  NP  Foam roll supine NP  Rows 10x GTB NP   Chin tucks w/TB    [facility] - Short 12/29/2022   Visit Number 8   VAS Pain Rating 5   Treadmill Time (in min.) 5   Retraction in Lying -   Retraction in Sitting 15   Sidebending -   Rotation -   Scapular Retraction 20   Manual Therapy 10   Cervical Extension - Seat Pad -   Seat Adjustment -   Top Dead Center -   Counterweight -   Cervical Flexion -   Cervical Extension -   Cervical Peak Torque -   Cervical Weight 102   Repetitions 20   Rating of Perceived Exertion 3             Peripheral muscle strengthening which included 1 set of 15-20 repetitions at a slow, controlled 10-13 second per rep pace focused on strengthening supporting musculature for improved body mechanics and functional mobility.  Pt and therapist focused on proper form during treatment to ensure optimal strengthening of each targeted muscle group.  Machines were utilized including torso rotation, leg extension, leg curl, chest press, upright row. Tricep extension, bicep curl, leg press, and hip abduction added visit 3    Chaya received the following manual therapy techniques: Suboccipital release, UT/LS/scalenes/SCM were applied for 10 minutes.     P/A cervical glides-NP  multifidus isometric for cervical facet entrapment-NP        Home Exercises Provided and Patient Education Provided   Home exercises include: yes, Cervical towel rotation, Scap retractions, Chin tucks  Cardio program: visit 5  Lifting education date: visit 11  Posture/Lumbar roll: discussed    Education provided:   - MedX pacing   -PRE scale    Written Home Exercises Provided: Patient instructed to cont prior " HEP.  Exercises were reviewed and Chaya was able to demonstrate them prior to the end of the session.  Chaya demonstrated good  understanding of the education provided.     See EMR under Patient Instructions for exercises provided prior visit.      Assessment   Chaya returned reporting slight decrease in L sided cervical discomfort.  Treatment continued with cervical and periscapular mobility and strengthening ex's, progressing by reincorporating a number of ex's that were not performed last visit due to pt's report of L shoulder pain last visit.  She tolerated today's treatment well with no increase in pain. Medx resistance maintained at 102#.  She completed 20 reps at a RPE of 3/10. Consider a 5% increase in resistance NV per HB protocol.      Anticipated Barriers for therapy: none  Pt's spiritual, cultural and educational needs considered and pt agreeable to plan of care and goals as stated below:         GOALS: Pt is in agreement with the following goals.     Short term goals: 6 weeks or 10 visits   1.  Pt will demonstratte increased cervical ROM as measured by med ex by 8 degrees from initial test which results in improved  ROM of neck for ease with ADLs and driving  (approp and ongoing)  2. Pt will demonstrate independence with reducing or controlling symptoms with ther ex, movement, or position independently, able to reduce pain 1-2 points on pain scale using strategies taught in therapy  (approp and ongoing)  3. Pt will demonstrate increased MedX average isometric strength value by 15% with  when compared to the initial testing resulting in improved ability to perform bending, lifting, and carrying activities safely, confidently.  (approp and ongoing)         Long term goals: 10 weeks or 20 visits  1. Pt will demonstratte increased cervical ROM as measured by med ex by 12 degrees from initial test which results in functional ROM of neck for ease with ADLs and driving  (approp and ongoing)  2. Pt  will demonstrate increased MedX average isometric strength value  by 30% from initial test to improve ability to lift and carry, and sustain good posture while performing ADL's  (approp and ongoing)  3.Pt will demonstrate reduced pain and improved functional outcomes as reported on the FOTO by reaching a limitation score of < or = 30% or less in order to demonstrate subjective improvement in pt's condition.    (approp and ongoing)  4. Pt will demonstrate independence with reducing or controlling symptoms with ther ex, movement, or position independently, able to reduce pain 2-4 points on pain scale using strategies taught in therapy  (approp and ongoing)  5. Pt will demonstrate independence with the HEP at discharge.   (approp and ongoing)  6.  Pt will be able to stand for at least 1 hour without pain to improve her ability to attend concerts. (approp and ongoing)           Plan   Continue with established Plan of Care towards established PT goals.       Therapist: Tacos Cardona, PTA  12/29/2022

## 2023-01-04 ENCOUNTER — PATIENT MESSAGE (OUTPATIENT)
Dept: REHABILITATION | Facility: OTHER | Age: 53
End: 2023-01-04
Payer: COMMERCIAL

## 2023-01-06 NOTE — PROGRESS NOTES
"DATE: 1/12/2023  PATIENT: Chaya Dee    Attending Physician: Mehul Mcgarry M.D.    HISTORY:  Chaya Dee is a 52 y.o. female who returns to me today for MRI results.  She was last seen by me 12/16/2022.  Today she is doing well but notes continued low back and neck pain.    The Patient denies myelopathic symptoms such as handwriting changes or difficulty with buttons/coins/keys. Denies perineal paresthesias, bowel/bladder dysfunction.    PMH/PSH/FamHx/SocHx:  Unchanged from prior visit    ROS:  REVIEW OF SYSTEMS:  Constitution: Negative. Negative for chills, fever and night sweats.   HENT: Negative for congestion and headaches.    Eyes: Negative for blurred vision, left vision loss and right vision loss.   Cardiovascular: Negative for chest pain and syncope.   Respiratory: Negative for cough and shortness of breath.    Endocrine: Negative for polydipsia, polyphagia and polyuria.   Hematologic/Lymphatic: Negative for bleeding problem. Does not bruise/bleed easily.   Skin: Negative for dry skin, itching and rash.   Musculoskeletal: Negative for falls and muscle weakness.   Gastrointestinal: Negative for abdominal pain and bowel incontinence.   Allergic/Immunologic: Negative for hives and persistent infections.  Genitourinary: Negative for urinary retention/incontinence and nocturia.   Neurological: negaitve for disturbances in coordination, no myelopathic symptoms such as handwriting changes or difficulty with buttons, coins, keys or small objects. No loss of balance and seizures.   Psychiatric/Behavioral: Negative for depression. The patient does not have insomnia.   Denies perineal paresthesias, bowel or bladder incontinence    EXAM:  Ht 5' 4" (1.626 m)   Wt 64.1 kg (141 lb 5 oz)   BMI 24.26 kg/m²   stable    IMAGING:    Today I personally reviewed AP, Lat and Flex/Ex  upright C-spine films that demonstrate straightening of the lordotic curve. Mild DDD at C5/6.      AP, Lat and Flex/Ex upright " lumbar spine films demonstrate no significant degenerative changes.     Lumbar MRI shows foraminal narrowing at L4/5.     Cervical MRI shows mild stenosis at C5/6 with foraminal narrowing.      Body mass index is 24.26 kg/m².    No results found for: HGBA1C      ASSESSMENT/PLAN:    Chaya was seen today for follow-up, pain and pain.    Diagnoses and all orders for this visit:    Lumbar radiculopathy  -     gabapentin (NEURONTIN) 300 MG capsule; Take 2 capsules (600 mg total) by mouth every evening.  -     methylPREDNISolone (MEDROL DOSEPACK) 4 mg tablet; use as directed    Cervical radiculopathy  -     gabapentin (NEURONTIN) 300 MG capsule; Take 2 capsules (600 mg total) by mouth every evening.  -     methylPREDNISolone (MEDROL DOSEPACK) 4 mg tablet; use as directed      Today we discussed at length all of the different treatment options including anti-inflammatories, acetaminophen, rest, ice, heat, physical therapy including strengthening and stretching exercises, home exercises, ROM, aerobic conditioning, aqua therapy, other modalities including ultrasound, massage, and dry needling, epidural steroid injections and finally surgical intervention.    The patient will be scheduled in the clinic with Dr. Reich for neck and low back pain.

## 2023-01-12 ENCOUNTER — OFFICE VISIT (OUTPATIENT)
Dept: ORTHOPEDICS | Facility: CLINIC | Age: 53
End: 2023-01-12
Payer: COMMERCIAL

## 2023-01-12 VITALS — WEIGHT: 141.31 LBS | HEIGHT: 64 IN | BODY MASS INDEX: 24.13 KG/M2

## 2023-01-12 DIAGNOSIS — M54.12 CERVICAL RADICULOPATHY: ICD-10-CM

## 2023-01-12 DIAGNOSIS — M54.16 LUMBAR RADICULOPATHY: Primary | ICD-10-CM

## 2023-01-12 PROCEDURE — 99213 PR OFFICE/OUTPT VISIT, EST, LEVL III, 20-29 MIN: ICD-10-PCS | Mod: S$GLB,,, | Performed by: REGISTERED NURSE

## 2023-01-12 PROCEDURE — 99999 PR PBB SHADOW E&M-EST. PATIENT-LVL III: CPT | Mod: PBBFAC,,, | Performed by: REGISTERED NURSE

## 2023-01-12 PROCEDURE — 99999 PR PBB SHADOW E&M-EST. PATIENT-LVL III: ICD-10-PCS | Mod: PBBFAC,,, | Performed by: REGISTERED NURSE

## 2023-01-12 PROCEDURE — 99213 OFFICE O/P EST LOW 20 MIN: CPT | Mod: S$GLB,,, | Performed by: REGISTERED NURSE

## 2023-01-12 RX ORDER — METHYLPREDNISOLONE 4 MG/1
TABLET ORAL
Qty: 1 EACH | Refills: 0 | Status: SHIPPED | OUTPATIENT
Start: 2023-01-12 | End: 2023-02-02

## 2023-01-12 RX ORDER — GABAPENTIN 300 MG/1
600 CAPSULE ORAL NIGHTLY
Qty: 60 CAPSULE | Refills: 11 | Status: ON HOLD | OUTPATIENT
Start: 2023-01-12 | End: 2023-10-05 | Stop reason: HOSPADM

## 2023-01-17 ENCOUNTER — PATIENT MESSAGE (OUTPATIENT)
Dept: ORTHOPEDICS | Facility: CLINIC | Age: 53
End: 2023-01-17
Payer: COMMERCIAL

## 2023-01-24 ENCOUNTER — CLINICAL SUPPORT (OUTPATIENT)
Dept: REHABILITATION | Facility: OTHER | Age: 53
End: 2023-01-24
Attending: NURSE PRACTITIONER
Payer: COMMERCIAL

## 2023-01-24 DIAGNOSIS — R29.898 IMPAIRED STRENGTH OF NECK MUSCLES: ICD-10-CM

## 2023-01-24 DIAGNOSIS — R29.3 POOR POSTURE: ICD-10-CM

## 2023-01-24 DIAGNOSIS — R29.898 DECREASED RANGE OF MOTION OF NECK: Primary | ICD-10-CM

## 2023-01-24 PROCEDURE — 97110 THERAPEUTIC EXERCISES: CPT | Mod: CQ

## 2023-01-24 PROCEDURE — 97140 MANUAL THERAPY 1/> REGIONS: CPT | Mod: CQ

## 2023-01-24 NOTE — PROGRESS NOTES
[facility] Back Physical Therapy Treatment      Name: Chaya Escudero Luiz  Clinic Number: [id number]    Therapy Diagnosis:   Encounter Diagnoses   Name Primary?    Decreased range of motion of neck Yes    Impaired strength of neck muscles     Poor posture        Physician: Tess Haas FNP    Visit Date: 2023    Physician Orders: PT Eval and Treat   Medical Diagnosis from Referral: M54.2 (ICD-10-CM) - Cervicalgia  Evaluation Date: 2022  Authorization Period Expiration: 2023  Plan of Care Expiration: 2023  Reassessment Due: 2022  Visit # / Visits authorized:     Time In: 2:20 AM  Time Out: 3:30 AM  Total Billable Time: 65 minutes  Insurance type:  Fee for service Insurance Patient    Precautions: Standard    Pattern of pain determined: Pattern 1     Subjective   Chaya returns following a multiple week absence for therapy reporting a pain level of 5/10.  She states that during her absence from therapy she began to     Patient reports their pain to be 5/10 on a 0-10 scale with 0 being no pain and 10 being the worst pain imaginable.  Pain Location: B cervical, L>R     Occupation:  and   Leisure: go to concerts, go out to dinner   Pts goals: be able to not be in pain, travel, strengthen/gain mobility    Objective     Baseline Isometric Testing on Med X equipment:  Testing administered by PT  Date of testin2022  ROM 42-96 deg   Max Peak Torque 104    Min Peak Torque 79    Flex/Ext Ratio 1.3   % below normative data 52%       Outcomes:  Initial score: 56%  Visit 5 score:  Goal:      Treatment    Pt was instructed in and performed the following:     Chaya received therapeutic exercises to develop/improved posture, cardiovascular endurance, muscular endurance, lumbar/cervical ROM, strength and muscular endurance for 50 minutes including the following exercises:     UT stretch 20 sec x 3  Levator stretch 20 sec x 3  Chin tucks seated  "15 x 5"  No money GTB x20   Prone over theraball W x15,3" T x15,3"  Y x15  Seated thoracic ext over red FR (lb substitution)  Standing open books (felt only in LB)       Not performed 1/24/2023 :  Cervical towel rotation stretch 10x 5 sec B  NP  Foam roll supine NP  Rows 10x GTB NP   Chin tucks w/TB  Scap retractions 15x   [facility] 1/24/2023   Visit Number 9   VAS Pain Rating 5   Treadmill Time (in min.) 5   Retraction in Lying -   Retraction in Sitting 15   Sidebending -   Rotation -   Scapular Retraction 20   Manual Therapy 15   Cervical Extension - Seat Pad -   Seat Adjustment -   Top Dead Center -   Counterweight -   Cervical Flexion -   Cervical Extension -   Cervical Peak Torque -   Cervical Weight 102   Repetitions 20   Rating of Perceived Exertion 4          Peripheral muscle strengthening which included 1 set of 15-20 repetitions at a slow, controlled 10-13 second per rep pace focused on strengthening supporting musculature for improved body mechanics and functional mobility.  Pt and therapist focused on proper form during treatment to ensure optimal strengthening of each targeted muscle group.  Machines were utilized including torso rotation, leg extension, leg curl, chest press, upright row. Tricep extension, bicep curl, leg press, and hip abduction added visit 3    Chaya received the following manual therapy techniques: Suboccipital release, UT/LS/scalenes/SCM were applied for 10 minutes.   P/A cervical glides with head over edge of bed    multifidus isometric for cervical facet entrapment-NP        Home Exercises Provided and Patient Education Provided   Home exercises include: yes, Cervical towel rotation, Scap retractions, Chin tucks  Cardio program: visit 5  Lifting education date: visit 11  Posture/Lumbar roll: discussed    Education provided:   - MedX pacing   -PRE scale    Written Home Exercises Provided: Patient instructed to cont prior HEP.  Exercises were reviewed and " Chaya was able to demonstrate them prior to the end of the session.  Chaya demonstrated good  understanding of the education provided.     See EMR under Patient Instructions for exercises provided prior visit.      Assessment   Chaya returned to therapy following a multiple week absence. She reported that during her absence form therapy she started to feel decreased pain levels but pain increased last week.  Treatment continued with cervical and periscapular mobility and strengthening ex's but was not progressed due to pt's extended time away from therapy. Manual techniques included suboccipital release, STM to UT and cervical paraspinals and PA cervical mobs with head over mat, following manual techniques pt reported slight decrease in pain levels.  Medx resistance remained at 102#. She completed 20 reps at a RPE of 4/10. Consider a 5% increase in resistance NV per HB protocol.    Anticipated Barriers for therapy: none  Pt's spiritual, cultural and educational needs considered and pt agreeable to plan of care and goals as stated below:         GOALS: Pt is in agreement with the following goals.     Short term goals: 6 weeks or 10 visits   1.  Pt will demonstratte increased cervical ROM as measured by med ex by 8 degrees from initial test which results in improved  ROM of neck for ease with ADLs and driving  (approp and ongoing)  2. Pt will demonstrate independence with reducing or controlling symptoms with ther ex, movement, or position independently, able to reduce pain 1-2 points on pain scale using strategies taught in therapy  (approp and ongoing)  3. Pt will demonstrate increased MedX average isometric strength value by 15% with  when compared to the initial testing resulting in improved ability to perform bending, lifting, and carrying activities safely, confidently.  (approp and ongoing)         Long term goals: 10 weeks or 20 visits  1. Pt will demonstratte increased cervical ROM as measured by  med ex by 12 degrees from initial test which results in functional ROM of neck for ease with ADLs and driving  (approp and ongoing)  2. Pt will demonstrate increased MedX average isometric strength value  by 30% from initial test to improve ability to lift and carry, and sustain good posture while performing ADL's  (approp and ongoing)  3.Pt will demonstrate reduced pain and improved functional outcomes as reported on the FOTO by reaching a limitation score of < or = 30% or less in order to demonstrate subjective improvement in pt's condition.    (approp and ongoing)  4. Pt will demonstrate independence with reducing or controlling symptoms with ther ex, movement, or position independently, able to reduce pain 2-4 points on pain scale using strategies taught in therapy  (approp and ongoing)  5. Pt will demonstrate independence with the HEP at discharge.   (approp and ongoing)  6.  Pt will be able to stand for at least 1 hour without pain to improve her ability to attend concerts. (approp and ongoing)           Plan   Continue with established Plan of Care towards established PT goals.       Therapist: Tacos Cardona, PTA  1/24/2023

## 2023-01-25 ENCOUNTER — OFFICE VISIT (OUTPATIENT)
Dept: PAIN MEDICINE | Facility: CLINIC | Age: 53
End: 2023-01-25
Attending: ANESTHESIOLOGY
Payer: COMMERCIAL

## 2023-01-25 VITALS
SYSTOLIC BLOOD PRESSURE: 108 MMHG | HEART RATE: 90 BPM | BODY MASS INDEX: 24.26 KG/M2 | TEMPERATURE: 99 F | RESPIRATION RATE: 19 BRPM | DIASTOLIC BLOOD PRESSURE: 73 MMHG | HEIGHT: 64 IN

## 2023-01-25 DIAGNOSIS — M47.816 SPONDYLOSIS OF LUMBAR REGION WITHOUT MYELOPATHY OR RADICULOPATHY: ICD-10-CM

## 2023-01-25 DIAGNOSIS — M53.3 COCCYGODYNIA: ICD-10-CM

## 2023-01-25 DIAGNOSIS — M47.812 SPONDYLOSIS OF CERVICAL REGION WITHOUT MYELOPATHY OR RADICULOPATHY: Primary | ICD-10-CM

## 2023-01-25 DIAGNOSIS — M53.3 SACROILIAC DYSFUNCTION: ICD-10-CM

## 2023-01-25 DIAGNOSIS — M51.36 DDD (DEGENERATIVE DISC DISEASE), LUMBAR: ICD-10-CM

## 2023-01-25 PROCEDURE — 99999 PR PBB SHADOW E&M-EST. PATIENT-LVL IV: ICD-10-PCS | Mod: PBBFAC,,, | Performed by: ANESTHESIOLOGY

## 2023-01-25 PROCEDURE — 99999 PR PBB SHADOW E&M-EST. PATIENT-LVL IV: CPT | Mod: PBBFAC,,, | Performed by: ANESTHESIOLOGY

## 2023-01-25 PROCEDURE — 99205 OFFICE O/P NEW HI 60 MIN: CPT | Mod: 25,S$GLB,, | Performed by: ANESTHESIOLOGY

## 2023-01-25 PROCEDURE — 99205 PR OFFICE/OUTPT VISIT, NEW, LEVL V, 60-74 MIN: ICD-10-PCS | Mod: 25,S$GLB,, | Performed by: ANESTHESIOLOGY

## 2023-01-25 NOTE — PROGRESS NOTES
"Subjective:      Patient ID: Chaya Dee is a 53 y.o. female.    Chief Complaint: neck and low back pain    Referred by: ELIZABETH Barba NP     HPI  Patient has been having left-sided neck pain for approximately 7 years. Radiates to the occiput bilaterally, as well as "down the spine to between the shoulder blades." Is worse during the day with computer work, and prolonged standing. Previously radiated throughout the entire left arm, with numbness and tingling, but this ceased 1 year ago. Additionally reports migraines daily: described as dull bilateral frontal headache that radiates to the base of the skull. Is currently topiramate and Emgality, as well as rizatriptan as abortive. Topiramate has not been very helpful, and just started CGRP. No associated weakness in the upper extremities. Back pain is described as being in the low back bilaterally, along with pain in right-sided buttocks. Pain does not radiate to either leg, no paresthesias. Made worse with prolonged standing, walking. No associated weakness. No fevers, chills, night sweats, history of cancer. Has been taking gabapentin and baclofen, which helps her sleep at night but not otherwise.    Interventional Pain History  States had two cervical PARAG injections (right facet injection, C4-5, C5-6) at [facility] in 2017. Cervical epidural 2018. Patient describes neither as efficacious.  Past Medical History:   Diagnosis Date    Allergy     Chronic allergic rhinitis 3/19/2016    Endometriosis     Laryngopharyngeal reflux disease 03/2018    PONV (postoperative nausea and vomiting)     Recurrent upper respiratory infection (URI)        Past Surgical History:   Procedure Laterality Date    ABDOMINAL SURGERY      endometrosis    ADENOIDECTOMY      NASAL SEPTUM SURGERY      SHOULDER SURGERY      SINUS SURGERY      TONSILLECTOMY         Imaging  Narrative & Impression  EXAMINATION: MRI LUMBAR SPINE WITHOUT CONTRAST: 12/27/22     CLINICAL HISTORY:  Low back " pain, symptoms persist with > 6wks conservative treatment;Low back pain, progressive neurologic deficit; Dorsalgia, unspecified     TECHNIQUE:  Multiplanar, multisequence MR images were acquired from the thoracolumbar junction to the sacrum without contrast.     COMPARISON:  Radiographs 12/16/2022     FINDINGS:  Alignment: Normal.     Vertebrae: Normal marrow signal. No fracture.     Discs: Disc desiccation annular fissure at L5-S1.  No evidence for discitis.     Cord: Conus terminates at L1 and appears unremarkable.  Cauda equina appears unremarkable.     Degenerative findings:     T12-L1: No spinal canal stenosis or neural foraminal narrowing.     L1-L2: No spinal canal stenosis or neural foraminal narrowing.     L2-L3: No spinal canal stenosis or neural foraminal narrowing.     L3-L4: Mild facet arthropathy.  No spinal canal stenosis or neural foraminal narrowing.     L4-L5: Circumferential disc bulge and mild facet arthropathy result in mild bilateral neural foraminal narrowing.     L5-S1: Circumferential disc bulge with annular fissure and mild facet arthropathy.  No spinal canal stenosis or neural foraminal narrowing.     Paraspinal muscles & soft tissues: Unremarkable.     Impression:     1. Overall mild degenerative changes of the lower lumbar spine.        Electronically signed by: Maico Marin MD  Date:                                            12/28/2022  Time:                                           08:35    EXAMINATION: MRI CERVICAL SPINE WITHOUT CONTRAST: 12/27/22     CLINICAL HISTORY:  Neck pain, chronic; Cervicalgia     TECHNIQUE:  Multiplanar, multisequence MR images of the cervical spine were performed without the administration of contrast.     COMPARISON:  Radiographs 12/16/2022.     FINDINGS:  C1-C2: Dens is intact.  Pre dens space is maintained.     Alignment: Grade 1 anterolisthesis of C2-C3. Straightening of lordosis.     Vertebrae: No fracture or marrow infiltrative process.  Reactive  bone marrow edema seen about the left C2-C3 facet joint.     Discs: Date disc heights are maintained.  No evidence for discitis.     Cord: Normal.     Skull base and craniocervical junction: Normal.     Degenerative findings:     C2-C3: Uncovertebral spurring and severe facet arthropathy result in moderate left neural foraminal narrowing.     C3-C4: Posterior disc osteophyte complex noted.  No spinal canal stenosis or neural foraminal narrowing.     C4-C5: Uncovertebral spurring and moderate facet arthropathy result in mild left neural foraminal narrowing.     C5-C6: Posterior disc osteophyte complex with uncovertebral spurring and moderate facet arthropathy result in moderate spinal canal stenosis and moderate bilateral neural foraminal narrowing.     C6-C7: Posterior disc osteophyte complex and moderate facet arthropathy noted.  No spinal canal stenosis or neural foraminal narrowing.  Note made of bilateral perineural sleeve cysts.     C7-T1: No spinal canal stenosis or neural foraminal narrowing.  Note made of bilateral perineural sleeve cysts.     Paraspinal muscles & soft tissues: Unremarkable.     Impression:     1. Multilevel degenerative changes of the cervical spine detailed above.  Note made of moderate spinal canal stenosis and neural foraminal narrowing at C5-C6.  Severe left facet arthropathy with bone marrow edema noted at C2-C3.        Electronically signed by: Maico Marin MD  Date:                                            12/28/2022  Time:                                           08:26    Review of patient's allergies indicates:  No Known Allergies      Current Outpatient Medications   Medication Sig Dispense Refill    baclofen (LIORESAL) 10 MG tablet Take 1 tablet (10 mg total) by mouth every 8 (eight) hours as needed (muscle spasm). 90 tablet 2    cholecalciferol, vitamin D3, 125 mcg (5,000 unit) Tab Take by mouth.      cyanocobalamin 1,000 mcg/mL injection every 14 (fourteen) days.   0     famotidine (PEPCID) 40 MG tablet       gabapentin (NEURONTIN) 300 MG capsule Take 2 capsules (600 mg total) by mouth every evening. 60 capsule 11    galcanezumab-gnlm 120 mg/mL PnIj Inject 120 mg into the skin every 28 days. Begin 28 days after loading dose. 1 mL 10    methylPREDNISolone (MEDROL DOSEPACK) 4 mg tablet use as directed 1 each 0    pantoprazole sodium (PANTOPRAZOLE ORAL) Take 40 mg by mouth before evening meal.      rizatriptan (MAXALT-MLT) 10 MG disintegrating tablet Take 1 tablet (10 mg total) by mouth every 2 (two) hours as needed for Migraine. Max 30 mg/day. 12 tablet 5    topiramate (TOPAMAX) 50 MG tablet Take 1 tablet (50 mg total) by mouth 2 (two) times daily. 180 tablet 1     No current facility-administered medications for this visit.       Family History   Problem Relation Age of Onset    Thyroid disease Sister     Allergic rhinitis Mother     Thyroid disease Maternal Aunt     Thyroid disease Maternal Uncle     Diabetes Maternal Grandmother         late in life    Heart disease Maternal Grandmother         Congestive heart failure    Stroke Paternal Grandfather          of stroke    Cancer Paternal Aunt         uterine       Social History     Socioeconomic History    Marital status:    Tobacco Use    Smoking status: Never    Smokeless tobacco: Never   Substance and Sexual Activity    Alcohol use: Yes     Alcohol/week: 2.0 standard drinks     Types: 2 Glasses of wine per week     Comment: socially    Drug use: No    Sexual activity: Not Currently     Partners: Male     Birth control/protection: Other-see comments, None     Comment: No longer taking.  In menopause.           Review of Systems   Constitutional: Negative for chills, fever, malaise/fatigue, night sweats and weight loss.   HENT:  Negative for congestion and sore throat.    Cardiovascular:  Negative for chest pain, leg swelling and palpitations.   Respiratory:  Negative for cough and shortness of breath.   "  Hematologic/Lymphatic: Negative for adenopathy. Does not bruise/bleed easily.   Musculoskeletal:  Positive for back pain and neck pain. Negative for arthritis, falls, joint pain, joint swelling, muscle weakness and stiffness.   Gastrointestinal:  Negative for abdominal pain, bowel incontinence, constipation, diarrhea, melena, nausea and vomiting.   Genitourinary:  Negative for bladder incontinence, dysuria, flank pain, frequency, hematuria and urgency.   Neurological:  Positive for headaches, numbness and paresthesias. Negative for difficulty with concentration, disturbances in coordination, dizziness, focal weakness, light-headedness, loss of balance and weakness.         Objective:   /73 (BP Location: Right arm, Patient Position: Sitting)   Pulse 90   Temp 98.9 °F (37.2 °C) (Oral)   Resp 19   Ht 5' 4" (1.626 m)   BMI 24.26 kg/m²   Pain Disability Index Review:  Last 3 PDI Scores 1/25/2023   Pain Disability Index (PDI) 36     Normocephalic.  Atraumatic.  Affect appropriate.  Breathing unlabored.  Extra ocular muscles intact.           General    Nursing note and vitals reviewed.  Constitutional: She is oriented to person, place, and time. She appears well-developed and well-nourished. No distress.   HENT:   Head: Normocephalic and atraumatic.   Eyes: Conjunctivae and EOM are normal. Pupils are equal, round, and reactive to light. Right eye exhibits no discharge. Left eye exhibits no discharge.   Cardiovascular:  Normal rate and regular rhythm.            Pulmonary/Chest: Effort normal.   Abdominal: Soft. She exhibits no distension. There is no abdominal tenderness. There is no rebound and no guarding.   Neurological: She is alert and oriented to person, place, and time.   Psychiatric: She has a normal mood and affect. Her behavior is normal.     General Musculoskeletal Exam   Gait: normal     Back (L-Spine & T-Spine) / Neck (C-Spine) Exam     Tenderness Right paramedian tenderness of the Lower C-Spine, " Upper C-Spine and Occ. Left paramedian tenderness of the Occ, Upper C-Spine and Lower C-Spine.     Back (L-Spine & T-Spine) Range of Motion   Extension:  normal   Flexion:  normal   Lateral bend right:  normal   Lateral bend left:  normal   Rotation right:  normal   Rotation left:  normal     Neck (C-Spine) Range of Motion   Flexion:      Moderate  Extension:  Moderate  Right Lateral Bend: abnormal  Left Lateral Bend: abnormal  Right Rotation: abnormal  Left Rotation: abnormal    Spinal Sensation   Right Side Sensation  C-Spine Level: normal   L-Spine Level: normal  S-Spine Level: normal  T-Spine Level: normal  Left Side Sensation  C-Spine Level: normal  L-Spine Level: normal  S-Spine Level: normal  T-Spine Level: normal    Comments:  Milgram's test positive on the right. SLR positive on right. SI thrust, SI compression, CHAGO, FADIR negative bilaterally. Cervical spine pain upon facet loading bilaterally, Spurling positive bilaterally.   Right Shoulder Exam     Range of Motion   Active abduction:  normal   Passive abduction:  normal   Extension:  normal   Forward Flexion:  normal   Forward Elevation: normal  Adduction: normal  External Rotation 0 degrees:  normal   External Rotation 90 degrees: normal  Internal rotation 0 degrees:  normal   Internal rotation 90 degrees:  normal     Tests & Signs   Drop arm: negative  Yin test: negative  Impingement: negative  Speed's Test: negative    Left Shoulder Exam     Range of Motion   Active abduction:  normal   Passive abduction:  normal   Extension:  normal   Forward Flexion:  normal   Forward Elevation: normal  Adduction: normal  External Rotation 0 degrees:  normal   External Rotation 90 degrees: normal  Internal rotation 0 degrees:  normal   Internal rotation 90 degrees:  normal     Tests & Signs   Drop arm: negative  Yin test: negative  Impingement: negative  Speed's Test: negative       Muscle Strength   Right Upper Extremity   Shoulder Abduction: 5/5    Shoulder Internal Rotation:    Shoulder External Rotation:    Supraspinatus:    Subscapularis:    Biceps:    Deltoid:    Triceps:    Wrist extension:    Wrist flexion:    Finger Flexors:    Finger Extensors:    Left Upper Extremity  Shoulder Abduction:    Shoulder Internal Rotation:    Shoulder External Rotation:    Supraspinatus:    Subscapularis:    Biceps:    Deltoid:    Triceps:    Wrist extension:    Wrist flexion:    Finger Flexors:    Finger Extensors:    Right Lower Extremity   Hip Abduction:    Hip Flexion:    Hip Extensors:   Quadriceps:     Hamstrin/5   Gastrocsoleus:     Left Lower Extremity   Hip Abduction:    Hip Flexion:    Hip Extensors:   Quadriceps:     Hamstrin/5   Gastrocsoleus:       Reflexes     Left Side  Biceps:  2+  Triceps:  2+  Brachioradialis:  2+  Achilles:  2+  Left Saul's Sign:  Absent  Babinski Sign:  absent  Ankle Clonus:  absent  Quadriceps:  2+    Right Side   Biceps:  2+  Triceps:  2+  Brachioradialis:  2+  Achilles:  2+  Right Saul's Sign:  absent  Babinski Sign:  absent  Ankle Clonus:  absent  Quadriceps:  2+    Vascular Exam     Right Pulses  Dorsalis Pedis:      3+          Left Pulses  Dorsalis Pedis:      3+            Assessment:       Encounter Diagnoses   Name Primary?    Spondylosis of cervical region without myelopathy or radiculopathy Yes    Spondylosis of lumbar region without myelopathy or radiculopathy     DDD (degenerative disc disease), lumbar     Sacroiliac dysfunction     Coccygodynia          Plan:         Chaya was seen today for neck pain and low-back pain.    Diagnoses and all orders for this visit:    Spondylosis of cervical region without myelopathy or radiculopathy    Spondylosis of lumbar region without myelopathy or radiculopathy    DDD (degenerative disc disease), lumbar    Sacroiliac dysfunction    Coccygodynia       We discussed  with the patient the assessment and recommendations. The following is the plan we agreed on:    Headache is likely multifactorial: migraine and cervicogenic (history with improvement  with current treatment of the photo/phonophobia, throbbing component) (exam with positive tenderness, Spurling positive, pain with facet loading on exam): patient has attempted PT previously, but with little success: may attempt facet joint injections at C2/3 which is higher than in the past; if patient prefers, or if steroid injection is inadequate for pain, may do C2, C3, TON nerve block and if >80% pain relief with two consecutive blocks, may proceed to RFA  Low back pain is likely due to facet disease: patient has also tried PT for this in the past, but without success in reduction of pain: may try facet injection at R L5/S1 (MRI demonstrates facet arthropathy at this level); if unsuccessful in alleviating pain, may additionally try L4, L5 nerve block and if >80% pain relief with two consecutive blocks, may proceed to RFA  Continue PT as tolerated for neck pain, back pain, as well as coccygodynia. Consider Impar block  Continue migraine treatment as is currently taking  Patient would like to take time to decide which of the above procedures for the cervical and lumbar spine she would like to have done  Scheduled follow up in 1 week after parade (virtual with JAY) to discuss with procedures she would prefer to pursue    Stewart Adkins, DO  Neurology PGY-1      I have personally taken the history and examined this patient and agree with the resident's note as stated above.

## 2023-01-27 ENCOUNTER — CLINICAL SUPPORT (OUTPATIENT)
Dept: REHABILITATION | Facility: OTHER | Age: 53
End: 2023-01-27
Attending: NURSE PRACTITIONER
Payer: COMMERCIAL

## 2023-01-27 DIAGNOSIS — R29.3 POOR POSTURE: ICD-10-CM

## 2023-01-27 DIAGNOSIS — R29.898 IMPAIRED STRENGTH OF NECK MUSCLES: ICD-10-CM

## 2023-01-27 DIAGNOSIS — R29.898 DECREASED RANGE OF MOTION OF NECK: Primary | ICD-10-CM

## 2023-01-27 PROCEDURE — 97110 THERAPEUTIC EXERCISES: CPT | Mod: CQ

## 2023-01-27 NOTE — PROGRESS NOTES
"[facility] Treatment      Name: Chaya Escudero Luiz  Clinic Number: [id number]    Therapy Diagnosis:   Encounter Diagnoses   Name Primary?    Decreased range of motion of neck Yes    Impaired strength of neck muscles     Poor posture        Physician: Tess Haas FNP    Visit Date: 2023    Physician Orders: PT Eval and Treat   Medical Diagnosis from Referral: M54.2 (ICD-10-CM) - Cervicalgia  Evaluation Date: 2022  Authorization Period Expiration: 2023  Plan of Care Expiration: 2023  Reassessment Due: 2022  Visit # / Visits authorized: 10/ 20 - Reassess visit 11, Lifting done visit 10    Time In: 2:30 PM  Time Out: 3:30 PM  Total Billable Time: 60 minutes  Insurance type:  Fee for service Insurance Patient    Precautions: Standard    Pattern of pain determined: Pattern 1     Subjective   Chaya returns with moderate neck complaints of pain.    Patient reports their pain to be 5/10 on a 0-10 scale with 0 being no pain and 10 being the worst pain imaginable.  Pain Location: B cervical, L>R     Occupation:  and   Leisure: go to concerts, go out to dinner   Pts goals: be able to not be in pain, travel, strengthen/gain mobility    Objective     Baseline Isometric Testing on Med X equipment:  Testing administered by PT  Date of testin2022  ROM 42-96 deg   Max Peak Torque 104    Min Peak Torque 79    Flex/Ext Ratio 1.3   % below normative data 52%       Outcomes:  Initial score: 56%  Visit 5 score:  Goal:      Treatment    Pt was instructed in and performed the following:     Chaya received therapeutic exercises to develop/improved posture, cardiovascular endurance, muscular endurance, lumbar/cervical ROM, strength and muscular endurance for 55 minutes including the following exercises:     UT stretch 20 sec x 3  Levator stretch 20 sec x 3  Chin tucks seated 15 x 5"  +Chin tuck and ext c/ towel x10  No money GTB " "x20   Prone over theraball W x15,3" T x15,3"  Y x15  Seated thoracic ext over red FR   +Thread the needle x10  +Lifting Do' and Dont's: hip hinge, golfers lift     [facility] 1/27/2023   Visit Number 10   VAS Pain Rating 5   Treadmill Time (in min.) 5   Cervical Stretches - Retraction In Lying -   Retraction in Sitting 15   Retraction with Extension 10   Sidebending -   Rotation -   Scapular Retraction 15   Manual Therapy -   Cervical Extension Seat Pad -   Seat Adjustment -   Top Dead Center -   Counterweight -   Cervical Flexion -   Cervical Extension -   Cervical Peak Torque -   Cervical Weight 108   Repetitions 15   Rating of Perceived Exertion 4   Ice - Z Lie (in min.) 5        Not performed 1/27/2023 :  Cervical towel rotation stretch 10x 5 sec B  NP  Foam roll supine NP  Rows 10x GTB NP   Chin tucks w/TB  Scap retractions 15x  Standing open books (felt only in LB)    [facility] 1/27/2023   Visit Number 10   VAS Pain Rating 5   Treadmill Time (in min.) 5   Cervical Stretches - Retraction In Lying -   Retraction in Sitting 15   Retraction with Extension 10   Sidebending -   Rotation -   Scapular Retraction 20   Manual Therapy -   Cervical Extension Seat Pad -   Seat Adjustment -   Top Dead Center -   Counterweight -   Cervical Flexion -   Cervical Extension -   Cervical Peak Torque -   Cervical Weight 108   Repetitions 15   Rating of Perceived Exertion 4   Ice - Z Lie (in min.) 5         Peripheral muscle strengthening which included 1 set of 15-20 repetitions at a slow, controlled 10-13 second per rep pace focused on strengthening supporting musculature for improved body mechanics and functional mobility.  Pt and therapist focused on proper form during treatment to ensure optimal strengthening of each targeted muscle group.  Machines were utilized including torso rotation, leg extension, leg curl, chest press, upright row. Tricep extension, bicep curl, leg press, and hip abduction added visit " 3    Chaya received the following manual therapy techniques:       Home Exercises Provided and Patient Education Provided   Home exercises include: yes, Cervical towel rotation, Scap retractions, Chin tucks  Cardio program: visit 5  Lifting education date: visit 11  Posture/Lumbar roll: discussed    Education provided:   - lifting techniques    Written Home Exercises Provided: Patient instructed to cont prior HEP.  Exercises were reviewed and Chaya was able to demonstrate them prior to the end of the session.  Chaya demonstrated good  understanding of the education provided.     See EMR under Patient Instructions for exercises provided prior visit.      Assessment   Chaya returned to therapy following a multiple week absence. She reported that during her absence form therapy she started to feel decreased pain levels but pain increased last week.  Treatment continued with cervical and periscapular mobility and strengthening ex's. Educated pt on proper lifting techniques with discussion, handout and performing lifting tasks.  Medx resistance remained at 108#. She completed 15 reps at a RPE of 4/10. Consider a 5% increase in resistance NV per HB protocol.    Anticipated Barriers for therapy: none  Pt's spiritual, cultural and educational needs considered and pt agreeable to plan of care and goals as stated below:         GOALS: Pt is in agreement with the following goals.     Short term goals: 6 weeks or 10 visits   1.  Pt will demonstratte increased cervical ROM as measured by med ex by 8 degrees from initial test which results in improved  ROM of neck for ease with ADLs and driving  (approp and ongoing)  2. Pt will demonstrate independence with reducing or controlling symptoms with ther ex, movement, or position independently, able to reduce pain 1-2 points on pain scale using strategies taught in therapy  (approp and ongoing)  3. Pt will demonstrate increased MedX average isometric strength value by  15% with  when compared to the initial testing resulting in improved ability to perform bending, lifting, and carrying activities safely, confidently.  (approp and ongoing)         Long term goals: 10 weeks or 20 visits  1. Pt will demonstratte increased cervical ROM as measured by med ex by 12 degrees from initial test which results in functional ROM of neck for ease with ADLs and driving  (approp and ongoing)  2. Pt will demonstrate increased MedX average isometric strength value  by 30% from initial test to improve ability to lift and carry, and sustain good posture while performing ADL's  (approp and ongoing)  3.Pt will demonstrate reduced pain and improved functional outcomes as reported on the FOTO by reaching a limitation score of < or = 30% or less in order to demonstrate subjective improvement in pt's condition.    (approp and ongoing)  4. Pt will demonstrate independence with reducing or controlling symptoms with ther ex, movement, or position independently, able to reduce pain 2-4 points on pain scale using strategies taught in therapy  (approp and ongoing)  5. Pt will demonstrate independence with the HEP at discharge.   (approp and ongoing)  6.  Pt will be able to stand for at least 1 hour without pain to improve her ability to attend concerts. (approp and ongoing)           Plan   Continue with established Plan of Care towards established PT goals.       Therapist: Tarah Luo, PTA  1/27/2023

## 2023-01-30 ENCOUNTER — CLINICAL SUPPORT (OUTPATIENT)
Dept: REHABILITATION | Facility: OTHER | Age: 53
End: 2023-01-30
Attending: NURSE PRACTITIONER
Payer: COMMERCIAL

## 2023-01-30 DIAGNOSIS — R29.898 IMPAIRED STRENGTH OF NECK MUSCLES: ICD-10-CM

## 2023-01-30 DIAGNOSIS — R29.898 DECREASED RANGE OF MOTION OF NECK: Primary | ICD-10-CM

## 2023-01-30 DIAGNOSIS — R29.3 POOR POSTURE: ICD-10-CM

## 2023-01-30 PROCEDURE — 97750 PHYSICAL PERFORMANCE TEST: CPT

## 2023-01-30 PROCEDURE — 97110 THERAPEUTIC EXERCISES: CPT

## 2023-01-30 NOTE — PROGRESS NOTES
[facility]      Name: Chaya Escudero Luiz  Clinic Number: [id number]    Therapy Diagnosis:   Encounter Diagnoses   Name Primary?    Decreased range of motion of neck Yes    Impaired strength of neck muscles     Poor posture          Physician: Tess Haas FNP    Visit Date: 2023    Physician Orders: PT Eval and Treat   Medical Diagnosis from Referral: M54.2 (ICD-10-CM) - Cervicalgia  Evaluation Date: 2022  Authorization Period Expiration: 2023  Plan of Care Expiration: 2023  Reassessment Due: 3  Visit # / Visits authorized:     Time In: 12:30 PM  Time Out: 13:30 PM  Total Billable Time: 60 minutes  Insurance type:  Fee for service Insurance Patient    Precautions: Standard    Pattern of pain determined: Pattern 1     Subjective   Chaya returns with about 6/10 pain in the neck and 5/10 in the low back    Patient reports their pain to be 5/10 on a 0-10 scale with 0 being no pain and 10 being the worst pain imaginable.  Pain Location: B cervical, L>R     Occupation:  and   Leisure: go to concerts, go out to dinner   Pts goals: be able to not be in pain, travel, strengthen/gain mobility    Objective     Baseline Isometric Testing on Med X equipment:  Testing administered by PT  Date of testin2022  ROM 42-96 deg   Max Peak Torque 104    Min Peak Torque 79    Flex/Ext Ratio 1.3   % below normative data 52%     [place] Isometric Testing on Med X equipment:  Testing administered by PT  Date of testin2023  ROM  deg   Max Peak Torque 116   Min Peak Torque 89   Flex/Ext Ratio 1.3   % below normative data 40   % change from initial visit 11       Outcomes:  Initial score: 56%  Visit 5 score:  Goal:      Treatment    Pt was instructed in and performed the following:     Chaya received therapeutic exercises to develop/improved posture, cardiovascular endurance, muscular endurance,  "lumbar/cervical ROM, strength and muscular endurance for 55 minutes including the following exercises:     UT stretch 20 sec x 3  Levator stretch 20 sec x 3  Chin tucks seated 10 x 5"  Chin tuck and ext c/ towel x10  No money GTB x20   Prone over theraball W x15,3" T x15,3"  Y x15 (NP)  Seated thoracic ext over red FR   Thread the needle x10  +Open book stretch x10     [facility] 1/30/2023   Visit Number 11   VAS Pain Rating 6   Treadmill Time (in min.) 5   Cervical Stretches - Retraction In Lying -   Retraction in Sitting 10   Retraction with Extension 10   Sidebending -   Rotation -   Scapular Retraction -   Manual Therapy -   Cervical Extension Seat Pad -   Seat Adjustment -   Top Dead Center -   Counterweight -   Cervical Flexion 102   Cervical Extension 36   Cervical Peak Torque 116   Cervical Weight -   Repetitions -   Rating of Perceived Exertion -   Ice - Z Lie (in min.) 5          Not performed 1/30/2023 :  Cervical towel rotation stretch 10x 5 sec B  NP  Foam roll supine NP  Rows 10x GTB NP   Chin tucks w/TB  Scap retractions 15x  Standing open books (felt only in LB)          Peripheral muscle strengthening which included 1 set of 15-20 repetitions at a slow, controlled 10-13 second per rep pace focused on strengthening supporting musculature for improved body mechanics and functional mobility.  Pt and therapist focused on proper form during treatment to ensure optimal strengthening of each targeted muscle group.  Machines were utilized including torso rotation, leg extension, leg curl, chest press, upright row. Tricep extension, bicep curl, leg press, and hip abduction added visit 3    Chaya received the following manual therapy techniques:       Home Exercises Provided and Patient Education Provided   Home exercises include: yes, Cervical towel rotation, Scap retractions, Chin tucks  Cardio program: visit 5  Lifting education date: visit 11  Posture/Lumbar roll: discussed    Education " provided:   - lifting techniques    Written Home Exercises Provided: Patient instructed to cont prior HEP.  Exercises were reviewed and Chaya was able to demonstrate them prior to the end of the session.  Chaya demonstrated good  understanding of the education provided.     See EMR under Patient Instructions for exercises provided prior visit.      Assessment   Patient has attended 11 visits at [facility] which included MD evaluation, PT evaluation with isometric testing, and physical therapy treatment including HEP instruction, education, aerobic work, dynamic strengthening on med ex equipment for the spine, and whole body strengthening on med ex equipment with increasing weight loads.  Patient  is demonstrating increased ability to reduce symptoms, improved posture, increased  ROM, and increased strength on med ex test by 11% average.    Patient is making good progress towards established goals.  Pt will continue to benefit from skilled outpatient physical therapy to address the deficits stated in the impairment chart, provide pt/family education and to maximize pt's level of independence in the home and community environment.     Anticipated Barriers for therapy: none  Pt's spiritual, cultural and educational needs considered and pt agreeable to plan of care and goals as stated below:         GOALS: Pt is in agreement with the following goals.     Short term goals: 6 weeks or 10 visits   1.  Pt will demonstratte increased cervical ROM as measured by med ex by 8 degrees from initial test which results in improved  ROM of neck for ease with ADLs and driving  (MET 1/30/2023)  2. Pt will demonstrate independence with reducing or controlling symptoms with ther ex, movement, or position independently, able to reduce pain 1-2 points on pain scale using strategies taught in therapy  (Partially met)  3. Pt will demonstrate increased MedX average isometric strength value by 15% with  when compared to the  initial testing resulting in improved ability to perform bending, lifting, and carrying activities safely, confidently.  (partially met)         Long term goals: 10 weeks or 20 visits  1. Pt will demonstratte increased cervical ROM as measured by med ex by 12 degrees from initial test which results in functional ROM of neck for ease with ADLs and driving   (MET 1/30/2023)  2. Pt will demonstrate increased MedX average isometric strength value  by 30% from initial test to improve ability to lift and carry, and sustain good posture while performing ADL's  (approp and ongoing)  3.Pt will demonstrate reduced pain and improved functional outcomes as reported on the FOTO by reaching a limitation score of < or = 30% or less in order to demonstrate subjective improvement in pt's condition.    (approp and ongoing)  4. Pt will demonstrate independence with reducing or controlling symptoms with ther ex, movement, or position independently, able to reduce pain 2-4 points on pain scale using strategies taught in therapy  (approp and ongoing)  5. Pt will demonstrate independence with the HEP at discharge.   (approp and ongoing)  6.  Pt will be able to stand for at least 1 hour without pain to improve her ability to attend concerts. (approp and ongoing)           Plan   Continue with established Plan of Care towards established PT goals.       Therapist: Ashley Toussaint, PT  1/30/2023

## 2023-02-03 ENCOUNTER — CLINICAL SUPPORT (OUTPATIENT)
Dept: REHABILITATION | Facility: OTHER | Age: 53
End: 2023-02-03
Attending: NURSE PRACTITIONER
Payer: COMMERCIAL

## 2023-02-03 DIAGNOSIS — R29.3 POOR POSTURE: ICD-10-CM

## 2023-02-03 DIAGNOSIS — R29.898 IMPAIRED STRENGTH OF NECK MUSCLES: ICD-10-CM

## 2023-02-03 DIAGNOSIS — R29.898 DECREASED RANGE OF MOTION OF NECK: Primary | ICD-10-CM

## 2023-02-03 PROCEDURE — 97110 THERAPEUTIC EXERCISES: CPT

## 2023-02-03 NOTE — PROGRESS NOTES
[facility]      Name: Chaya Dee  Clinic Number: [id number]    Therapy Diagnosis:   Encounter Diagnoses   Name Primary?    Decreased range of motion of neck Yes    Impaired strength of neck muscles     Poor posture        Physician: Tess Haas FNP    Visit Date: 2/3/2023    Physician Orders: PT Eval and Treat   Medical Diagnosis from Referral: M54.2 (ICD-10-CM) - Cervicalgia  Evaluation Date: 2022  Authorization Period Expiration: 2023  Plan of Care Expiration: 2023  Reassessment Due: 2023  Visit # / Visits authorized:     Time In: 9:03 AM  Time Out: 10:03 AM  Total Billable Time: 60 minutes  Insurance type:  Fee for service Insurance Patient    Precautions: Standard    Pattern of pain determined: Pattern 1     Subjective   Chaya reports she was sore for about 2 days after last PT session. She reports her neck is feeling a little better today, but the Left side still bothers her more. She states she continues to have difficulty with prolonged standing and walking.     Patient reports their pain to be 4/10 on a 0-10 scale with 0 being no pain and 10 being the worst pain imaginable.  Pain Location: B cervical, L>R     Occupation:  and   Leisure: go to concerts, go out to dinner   Pts goals: be able to not be in pain, travel, strengthen/gain mobility    Objective     Baseline Isometric Testing on Med X equipment:  Testing administered by PT  Date of testin2022  ROM 42-96 deg   Max Peak Torque 104    Min Peak Torque 79    Flex/Ext Ratio 1.3   % below normative data 52%     [place] Isometric Testing on Med X equipment:  Testing administered by PT  Date of testin2023  ROM  deg   Max Peak Torque 116   Min Peak Torque 89   Flex/Ext Ratio 1.3   % below normative data 40   % change from initial visit 11       Outcomes:  Initial score: 56%  Visit 5 score:  Goal:      Treatment    Pt was  "instructed in and performed the following:     Chaya received therapeutic exercises to develop/improved posture, cardiovascular endurance, muscular endurance, lumbar/cervical ROM, strength and muscular endurance for 60 minutes including the following exercises:     UT stretch 20 sec x 3  Levator stretch 20 sec x 3  Chin tucks seated 10 x 5"  Chin tuck and ext c/ towel x10   Seated thoracic ext over red FR   Thread the needle x10 ea sie  Open book stretch x10 ea side  Prone over theraball W x15,3" T x15,3"  Y x15   Rows BTB 2x10       Not performed 2/3/2023 :  No money GTB x20  Cervical towel rotation stretch 10x 5 sec B  NP  Foam roll supine NP   Chin tucks w/TB  Scap retractions 15x  Standing open books (felt only in LB)      [facility] 2/3/2023   Visit Number 12   VAS Pain Rating 4   Treadmill Time (in min.) 5   Cervical Stretches - Retraction In Lying -   Retraction in Sitting 10   Retraction with Extension 10   Sidebending -   Rotation -   Scapular Retraction -   Manual Therapy -   Cervical Extension Seat Pad -   Seat Adjustment -   Top Dead Center -   Counterweight -   Cervical Flexion -   Cervical Extension -   Cervical Peak Torque -   Cervical Weight 108   Repetitions 20   Rating of Perceived Exertion 3   Ice - Z Lie (in min.) -         Peripheral muscle strengthening which included 1 set of 15-20 repetitions at a slow, controlled 10-13 second per rep pace focused on strengthening supporting musculature for improved body mechanics and functional mobility.  Pt and therapist focused on proper form during treatment to ensure optimal strengthening of each targeted muscle group.  Machines were utilized including torso rotation, leg extension, leg curl, chest press, upright row. Tricep extension, bicep curl, leg press, and hip abduction added visit 3    Chaya received the following manual therapy techniques: null      Home Exercises Provided and Patient Education Provided   Home exercises include: " yes, Cervical towel rotation, Scap retractions, Chin tucks  Cardio program: visit 5  Lifting education date: visit 11  Posture/Lumbar roll: discussed    Education provided:   - lifting techniques    Written Home Exercises Provided: Patient instructed to cont prior HEP.  Exercises were reviewed and Chaya was able to demonstrate them prior to the end of the session.  Chaya demonstrated good  understanding of the education provided.     See EMR under Patient Instructions for exercises provided prior visit.      Assessment   Chaya presents to session today with neck pain rated 4/10. Treatment continued to focus on cervical/thoracic mobility, cervical stability, and postural strengthening exercises.  Pt was able to perform all ther ex with appropriate muscular fatigue without complaints of increased pain. Cervical MedX reps was increased to 20 reps at 108 in/lbs with a RPE = 3/10. Pt was also able to tolerate progression to 20 reps on peripheral machines. Pt deferred cold pack at end of session due to needing to leave for work. Instructed pt to ice at home to decrease soreness and she verbalized understanding.  Will continue to progress per HB protocol and patient tolerance.    Patient is making good progress towards established goals.  Pt will continue to benefit from skilled outpatient physical therapy to address the deficits stated in the impairment chart, provide pt/family education and to maximize pt's level of independence in the home and community environment.     Anticipated Barriers for therapy: none  Pt's spiritual, cultural and educational needs considered and pt agreeable to plan of care and goals as stated below:       GOALS: Pt is in agreement with the following goals.     Short term goals: 6 weeks or 10 visits   1.  Pt will demonstratte increased cervical ROM as measured by med ex by 8 degrees from initial test which results in improved  ROM of neck for ease with ADLs and driving  (MET  1/30/2023)  2. Pt will demonstrate independence with reducing or controlling symptoms with ther ex, movement, or position independently, able to reduce pain 1-2 points on pain scale using strategies taught in therapy  (Partially met)  3. Pt will demonstrate increased MedX average isometric strength value by 15% with  when compared to the initial testing resulting in improved ability to perform bending, lifting, and carrying activities safely, confidently.  (partially met)         Long term goals: 10 weeks or 20 visits  1. Pt will demonstratte increased cervical ROM as measured by med ex by 12 degrees from initial test which results in functional ROM of neck for ease with ADLs and driving   (MET 1/30/2023)  2. Pt will demonstrate increased MedX average isometric strength value  by 30% from initial test to improve ability to lift and carry, and sustain good posture while performing ADL's  (approp and ongoing)  3.Pt will demonstrate reduced pain and improved functional outcomes as reported on the FOTO by reaching a limitation score of < or = 30% or less in order to demonstrate subjective improvement in pt's condition.    (approp and ongoing)  4. Pt will demonstrate independence with reducing or controlling symptoms with ther ex, movement, or position independently, able to reduce pain 2-4 points on pain scale using strategies taught in therapy  (approp and ongoing)  5. Pt will demonstrate independence with the HEP at discharge.   (approp and ongoing)  6.  Pt will be able to stand for at least 1 hour without pain to improve her ability to attend concerts. (approp and ongoing)       Plan   Continue with established Plan of Care towards established PT goals.       Therapist: Antoinette Garcia, PT  2/3/2023

## 2023-02-09 ENCOUNTER — CLINICAL SUPPORT (OUTPATIENT)
Dept: REHABILITATION | Facility: OTHER | Age: 53
End: 2023-02-09
Attending: NURSE PRACTITIONER
Payer: COMMERCIAL

## 2023-02-09 DIAGNOSIS — R29.3 POOR POSTURE: ICD-10-CM

## 2023-02-09 DIAGNOSIS — R29.898 IMPAIRED STRENGTH OF NECK MUSCLES: ICD-10-CM

## 2023-02-09 DIAGNOSIS — R29.898 DECREASED RANGE OF MOTION OF NECK: Primary | ICD-10-CM

## 2023-02-09 PROCEDURE — 97110 THERAPEUTIC EXERCISES: CPT

## 2023-02-09 NOTE — PROGRESS NOTES
[facility] Back Physical Therapy Treatment      Name: Chaya Dee  Clinic Number: [id number]    Therapy Diagnosis:   Encounter Diagnoses   Name Primary?    Decreased range of motion of neck Yes    Impaired strength of neck muscles     Poor posture        Physician: Tess Haas FNP    Visit Date: 2023    Physician Orders: PT Eval and Treat   Medical Diagnosis from Referral: M54.2 (ICD-10-CM) - Cervicalgia  Evaluation Date: 2022  Authorization Period Expiration: 2023  Plan of Care Expiration: 2023  Reassessment Due: 2023  Visit # / Visits authorized:     Time In: 3:30 PM  Time Out: 4:30 PM  Total Billable Time: 60 minutes  Insurance type:  Fee for service Insurance Patient    Precautions: Standard    Pattern of pain determined: Pattern 1     Subjective   Chaya reports she has increased pain today and rates it as a 6/10 and describes it as achy and sore. Pt reports that she was not able to attend yoga on Monday and that she had a hard time sleeping last night. Pt notes this is her first therapy session this week due to scheduling issues.     Patient reports their pain to be 6/10 on a 0-10 scale with 0 being no pain and 10 being the worst pain imaginable.  Pain Location: B cervical, L>R     Occupation:  and   Leisure: go to concerts, go out to dinner   Pts goals: be able to not be in pain, travel, strengthen/gain mobility    Objective     Baseline Isometric Testing on Med X equipment:  Testing administered by PT  Date of testin2022  ROM 42-96 deg   Max Peak Torque 104    Min Peak Torque 79    Flex/Ext Ratio 1.3   % below normative data 52%     [place] Isometric Testing on Med X equipment:  Testing administered by PT  Date of testin2023  ROM  deg   Max Peak Torque 116   Min Peak Torque 89   Flex/Ext Ratio 1.3   % below normative data 40   % change from initial visit 11       Outcomes:  Initial score: 56%  Visit  "5 score:  Goal:      Treatment    Pt was instructed in and performed the following:       Chaya received the following manual therapy techniques for 10 minutes:   Cervical distraction   Sub occipital release   STM posterior/lateral c-spine musculature   Manual upper trap stretch bilaterally 2x30"      Chaya received therapeutic exercises to develop/improved posture, cardiovascular endurance, muscular endurance, lumbar/cervical ROM, strength and muscular endurance for 50 minutes including the following exercises:     + Chin tucks in prone 10 x 5"  Chin tuck and ext c/ towel x10   Seated thoracic ext over red FR   Quadruped over theraball W x15,3" T x15,3"  Y x15 #1 ea  Rows BTB 2x10       Not performed 2/9/2023 :  No money GTB x20  Cervical towel rotation stretch 10x 5 sec B  NP  Foam roll supine NP   Chin tucks w/TB  Scap retractions 15x  Standing open books (felt only in LB)  UT stretch 20 sec x 3  Levator stretch 20 sec x 3  Thread the needle x10 ea sie  Open book stretch x10 ea side      [facility] 2/9/2023   Visit Number 13   VAS Pain Rating 6   Treadmill Time (in min.) 5   Cervical Stretches - Retraction In Lying -   Retraction in Sitting -   Retraction with Extension 10   Sidebending -   Rotation -   Scapular Retraction -   Manual Therapy 10   Cervical Extension Seat Pad -   Seat Adjustment -   Top Dead Center -   Counterweight -   Cervical Flexion -   Cervical Extension -   Cervical Peak Torque -   Cervical Weight 108   Repetitions 20   Rating of Perceived Exertion 4   Ice - Z Lie (in min.) -           Peripheral muscle strengthening which included 1 set of 15-20 repetitions at a slow, controlled 10-13 second per rep pace focused on strengthening supporting musculature for improved body mechanics and functional mobility.  Pt and therapist focused on proper form during treatment to ensure optimal strengthening of each targeted muscle group.  Machines were utilized including torso rotation, leg " extension, leg curl, chest press, upright row. Tricep extension, bicep curl, leg press, and hip abduction added visit 3        Home Exercises Provided and Patient Education Provided   Home exercises include: yes, Cervical towel rotation, Scap retractions, Chin tucks  Cardio program: visit 5  Lifting education date: visit 11  Posture/Lumbar roll: discussed    Education provided:   - lifting techniques    Written Home Exercises Provided: Patient instructed to cont prior HEP.  Exercises were reviewed and Chaya was able to demonstrate them prior to the end of the session.  Chaya demonstrated good  understanding of the education provided.     See EMR under Patient Instructions for exercises provided prior visit.      Assessment   Chaya presents to session today with neck pain rated 6/10. Initiated manual therapy today with focus on soft tissue mobilization and gentle distraction in order to alleviate pain. Treatment continued to focus on cervical/thoracic mobility, cervical stability, and postural strengthening exercises.  Pt was able to perform all ther ex with appropriate muscular fatigue without complaints of increased pain. Able to trial and perform prone chin tucks against gravity with good performance. Cervical MedX reps maintained at 20 reps at 108 in/lbs with a RPE = 4/10. Pt was also able to tolerate progression of increase load on peripheral machines. Pt deferred cold pack at end of session due to needing to leave for work. Instructed pt to ice at home to decrease soreness and she verbalized understanding.  Will continue to progress per HB protocol and patient tolerance.    Patient is making good progress towards established goals.  Pt will continue to benefit from skilled outpatient physical therapy to address the deficits stated in the impairment chart, provide pt/family education and to maximize pt's level of independence in the home and community environment.     Anticipated Barriers for  therapy: none  Pt's spiritual, cultural and educational needs considered and pt agreeable to plan of care and goals as stated below:       GOALS: Pt is in agreement with the following goals.     Short term goals: 6 weeks or 10 visits   1.  Pt will demonstratte increased cervical ROM as measured by med ex by 8 degrees from initial test which results in improved  ROM of neck for ease with ADLs and driving  (MET 1/30/2023)  2. Pt will demonstrate independence with reducing or controlling symptoms with ther ex, movement, or position independently, able to reduce pain 1-2 points on pain scale using strategies taught in therapy  (Partially met)  3. Pt will demonstrate increased MedX average isometric strength value by 15% with  when compared to the initial testing resulting in improved ability to perform bending, lifting, and carrying activities safely, confidently.  (partially met)         Long term goals: 10 weeks or 20 visits  1. Pt will demonstratte increased cervical ROM as measured by med ex by 12 degrees from initial test which results in functional ROM of neck for ease with ADLs and driving   (MET 1/30/2023)  2. Pt will demonstrate increased MedX average isometric strength value  by 30% from initial test to improve ability to lift and carry, and sustain good posture while performing ADL's  (approp and ongoing)  3.Pt will demonstrate reduced pain and improved functional outcomes as reported on the FOTO by reaching a limitation score of < or = 30% or less in order to demonstrate subjective improvement in pt's condition.    (approp and ongoing)  4. Pt will demonstrate independence with reducing or controlling symptoms with ther ex, movement, or position independently, able to reduce pain 2-4 points on pain scale using strategies taught in therapy  (approp and ongoing)  5. Pt will demonstrate independence with the HEP at discharge.   (approp and ongoing)  6.  Pt will be able to stand for at least 1 hour without pain  to improve her ability to attend concerts. (approp and ongoing)       Plan   Continue with established Plan of Care towards established PT goals.       Therapist: Ramandeep Matthews, PT  2/9/2023

## 2023-02-13 ENCOUNTER — CLINICAL SUPPORT (OUTPATIENT)
Dept: REHABILITATION | Facility: OTHER | Age: 53
End: 2023-02-13
Attending: NURSE PRACTITIONER
Payer: COMMERCIAL

## 2023-02-13 DIAGNOSIS — R29.3 POOR POSTURE: ICD-10-CM

## 2023-02-13 DIAGNOSIS — R29.898 IMPAIRED STRENGTH OF NECK MUSCLES: ICD-10-CM

## 2023-02-13 DIAGNOSIS — R29.898 DECREASED RANGE OF MOTION OF NECK: Primary | ICD-10-CM

## 2023-02-13 PROCEDURE — 97110 THERAPEUTIC EXERCISES: CPT

## 2023-02-13 NOTE — PROGRESS NOTES
[facility] Back Physical Therapy Treatment      Name: Chaya Escudero Luiz  Clinic Number: [id number]    Therapy Diagnosis:   Encounter Diagnoses   Name Primary?    Decreased range of motion of neck Yes    Impaired strength of neck muscles     Poor posture          Physician: Tess Haas FNP    Visit Date: 2023    Physician Orders: PT Eval and Treat   Medical Diagnosis from Referral: M54.2 (ICD-10-CM) - Cervicalgia  Evaluation Date: 2022  Authorization Period Expiration: 2023  Plan of Care Expiration: 2023  Reassessment Due: 2023  Visit # / Visits authorized:     Time In: 2:00 PM  Time Out: 3:00 PM  Total Billable Time: 60 minutes  Insurance type:  Fee for service Insurance Patient    Precautions: Standard    Pattern of pain determined: Pattern 1     Subjective   Chaya reports she rested over the weekend so her neck is not as sore as last time, only a little bit of pain on either side of the neck today     Patient reports their pain to be 3/10 on a 0-10 scale with 0 being no pain and 10 being the worst pain imaginable.  Pain Location: B cervical, L>R     Occupation:  and   Leisure: go to concerts, go out to dinner   Pts goals: be able to not be in pain, travel, strengthen/gain mobility    Objective     Baseline Isometric Testing on Med X equipment:  Testing administered by PT  Date of testin2022  ROM 42-96 deg   Max Peak Torque 104    Min Peak Torque 79    Flex/Ext Ratio 1.3   % below normative data 52%     [place] Isometric Testing on Med X equipment:  Testing administered by PT  Date of testin2023  ROM  deg   Max Peak Torque 116   Min Peak Torque 89   Flex/Ext Ratio 1.3   % below normative data 40   % change from initial visit 11       Outcomes:  Initial score: 56%  Visit 5 score:  Goal:      Treatment    Pt was instructed in and performed the following:       Chaya received the following manual therapy  "techniques for 5 minutes:   Cervical distraction   Sub occipital release   STM posterior/lateral c-spine musculature   Manual upper trap stretch bilaterally 2x30"      Chaya received therapeutic exercises to develop/improved posture, cardiovascular endurance, muscular endurance, lumbar/cervical ROM, strength and muscular endurance for 55 minutes including the following exercises:     [facility] - [facility] 2/13/2023   Visit Number 14   VAS Pain Rating 3   Treadmill Time (in min.) 5   Retraction in Lying -   Retraction in Sitting 10   Retraction with Extension 10   Sidebending -   Rotation -   Scapular Retraction 30   Manual Therapy 5   Cervical Extension - Seat Pad -   Seat Adjustment -   Top Dead Center -   Counterweight -   Cervical Flexion -   Cervical Extension -   Cervical Peak Torque -   Cervical Weight 117   Repetitions 20   Rating of Perceived Exertion 3       Open book stretch x10 ea side  Chin tucks seated 15 x 5"  Chin tuck and ext c/ towel x15  Seated thoracic ext over red FR 15x3"  Quadruped over theraball W x10,3" T x10,3"  Y x10 #1 ea - cues to engage chin tuck with arm movement         Not performed 2/13/2023 :  No money GTB x20  Rows BTB 2x10  Cervical towel rotation stretch 10x 5 sec B  NP  Foam roll supine NP   Chin tucks w/TB  Scap retractions 15x  Standing open books (felt only in LB)  UT stretch 20 sec x 3  Levator stretch 20 sec x 3  Thread the needle x10 ea sie          Peripheral muscle strengthening which included 1 set of 15-20 repetitions at a slow, controlled 10-13 second per rep pace focused on strengthening supporting musculature for improved body mechanics and functional mobility.  Pt and therapist focused on proper form during treatment to ensure optimal strengthening of each targeted muscle group.  Machines were utilized including torso rotation, leg extension, leg curl, chest press, upright row. Tricep extension, bicep curl, leg press, and hip abduction added visit " 3        Home Exercises Provided and Patient Education Provided   Home exercises include: yes, Cervical towel rotation, Scap retractions, Chin tucks  Cardio program: visit 5  Lifting education date: visit 11  Posture/Lumbar roll: discussed    Education provided:   - lifting techniques    Written Home Exercises Provided: Patient instructed to cont prior HEP.  Exercises were reviewed and Chaya was able to demonstrate them prior to the end of the session.  Chaya demonstrated good  understanding of the education provided.     See EMR under Patient Instructions for exercises provided prior visit.      Assessment   Chaya presents today with decreased pain levels compared to last visit. Still with some soreness in bilateral cervical paraspinals, but reported decreased tightness and soreness post manual therapy techniques. Medx ROM increased to  deg and resistance progressed to 117 in/lbs. Pt able to complete 20 reps with 3/10 RPE. Progress 10% next visit.     Patient is making good progress towards established goals.  Pt will continue to benefit from skilled outpatient physical therapy to address the deficits stated in the impairment chart, provide pt/family education and to maximize pt's level of independence in the home and community environment.     Anticipated Barriers for therapy: none  Pt's spiritual, cultural and educational needs considered and pt agreeable to plan of care and goals as stated below:       GOALS: Pt is in agreement with the following goals.     Short term goals: 6 weeks or 10 visits   1.  Pt will demonstratte increased cervical ROM as measured by med ex by 8 degrees from initial test which results in improved  ROM of neck for ease with ADLs and driving  (MET 1/30/2023)  2. Pt will demonstrate independence with reducing or controlling symptoms with ther ex, movement, or position independently, able to reduce pain 1-2 points on pain scale using strategies taught in therapy   (Partially met)  3. Pt will demonstrate increased MedX average isometric strength value by 15% with  when compared to the initial testing resulting in improved ability to perform bending, lifting, and carrying activities safely, confidently.  (partially met)         Long term goals: 10 weeks or 20 visits  1. Pt will demonstratte increased cervical ROM as measured by med ex by 12 degrees from initial test which results in functional ROM of neck for ease with ADLs and driving   (MET 1/30/2023)  2. Pt will demonstrate increased MedX average isometric strength value  by 30% from initial test to improve ability to lift and carry, and sustain good posture while performing ADL's  (approp and ongoing)  3.Pt will demonstrate reduced pain and improved functional outcomes as reported on the FOTO by reaching a limitation score of < or = 30% or less in order to demonstrate subjective improvement in pt's condition.    (approp and ongoing)  4. Pt will demonstrate independence with reducing or controlling symptoms with ther ex, movement, or position independently, able to reduce pain 2-4 points on pain scale using strategies taught in therapy  (approp and ongoing)  5. Pt will demonstrate independence with the HEP at discharge.   (approp and ongoing)  6.  Pt will be able to stand for at least 1 hour without pain to improve her ability to attend concerts. (approp and ongoing)       Plan   Continue with established Plan of Care towards established PT goals.       Therapist: Kristofer Montgomery, PT  2/13/2023

## 2023-02-15 ENCOUNTER — CLINICAL SUPPORT (OUTPATIENT)
Dept: REHABILITATION | Facility: OTHER | Age: 53
End: 2023-02-15
Attending: NURSE PRACTITIONER
Payer: COMMERCIAL

## 2023-02-15 DIAGNOSIS — R29.898 IMPAIRED STRENGTH OF NECK MUSCLES: ICD-10-CM

## 2023-02-15 DIAGNOSIS — R29.3 POOR POSTURE: ICD-10-CM

## 2023-02-15 DIAGNOSIS — R29.898 DECREASED RANGE OF MOTION OF NECK: Primary | ICD-10-CM

## 2023-02-15 PROCEDURE — 97110 THERAPEUTIC EXERCISES: CPT

## 2023-02-15 NOTE — PROGRESS NOTES
[facility] Back Physical Therapy Treatment      Name: Chaya Dee  Clinic Number: [id number]    Therapy Diagnosis:   Encounter Diagnoses   Name Primary?    Decreased range of motion of neck Yes    Impaired strength of neck muscles     Poor posture        Physician: Tess Haas FNP    Visit Date: 2/15/2023    Physician Orders: PT Eval and Treat   Medical Diagnosis from Referral: M54.2 (ICD-10-CM) - Cervicalgia  Evaluation Date: 2022  Authorization Period Expiration: 2023  Plan of Care Expiration: 2023  Reassessment Due: 2023  Visit # / Visits authorized: 15/20    Time In: 11:30 PM  Time Out: 12:30 PM  Total Billable Time: 55 minutes  Insurance type:  Fee for service Insurance Patient    Precautions: Standard    Pattern of pain determined: Pattern 1     Subjective   Chaya reports she is having the burning down her neck to between her shoulder blades today. She did notice today while driving it is easier to turn her head.     Patient reports their pain to be 3/10 on a 0-10 scale with 0 being no pain and 10 being the worst pain imaginable.  Pain Location: B cervical, L>R     Occupation:  and   Leisure: go to concerts, go out to dinner   Pts goals: be able to not be in pain, travel, strengthen/gain mobility    Objective     Baseline Isometric Testing on Med X equipment:  Testing administered by PT  Date of testin2022  ROM 42-96 deg   Max Peak Torque 104    Min Peak Torque 79    Flex/Ext Ratio 1.3   % below normative data 52%     [place] Isometric Testing on Med X equipment:  Testing administered by PT  Date of testin2023  ROM  deg   Max Peak Torque 116   Min Peak Torque 89   Flex/Ext Ratio 1.3   % below normative data 40   % change from initial visit 11       Outcomes:  Initial score: 56%  Visit 5 score:  Goal:      Treatment    Pt was instructed in and performed the following:       Chaya received the following  "manual therapy techniques for 5 minutes:   Cervical distraction   Sub occipital release   STM posterior/lateral c-spine musculature- np  Manual upper trap stretch bilaterally 2x30"-np      Chaya received therapeutic exercises to develop/improved posture, cardiovascular endurance, muscular endurance, lumbar/cervical ROM, strength and muscular endurance for 55 minutes including the following exercises:       Open book stretch x10 ea side  Chin tucks seated 15 x 5"  Chin tuck and ext c/ towel x15  Seated thoracic ext over red FR 15x3"  Quadruped over theraball W x10,3" T x10,3"  Y x10 #1 ea - cues to engage chin tuck with arm movement       [facility] 2/20/2023   Visit Number 15   VAS Pain Rating 3   Treadmill Time (in min.) 5   Cervical Stretches - Retraction In Lying -   Retraction in Sitting 10   Retraction with Extension 10   Sidebending -   Rotation -   Scapular Retraction 30   Manual Therapy 10   Cervical Extension Seat Pad -   Seat Adjustment -   Top Dead Center -   Counterweight -   Cervical Flexion -   Cervical Extension -   Cervical Peak Torque -   Cervical Weight 123   Repetitions 15   Rating of Perceived Exertion 3.5   Ice - Z Lie (in min.) 5         Not performed 2/15/2023 :  No money GTB x20  Rows BTB 2x10  Cervical towel rotation stretch 10x 5 sec B  NP  Foam roll supine NP   Chin tucks w/TB  Scap retractions 15x  Standing open books (felt only in LB)  UT stretch 20 sec x 3  Levator stretch 20 sec x 3  Thread the needle x10 ea sie          Peripheral muscle strengthening which included 1 set of 15-20 repetitions at a slow, controlled 10-13 second per rep pace focused on strengthening supporting musculature for improved body mechanics and functional mobility.  Pt and therapist focused on proper form during treatment to ensure optimal strengthening of each targeted muscle group.  Machines were utilized including torso rotation, leg extension, leg curl, chest press, upright row. Tricep " extension, bicep curl, leg press, and hip abduction added visit 3        Home Exercises Provided and Patient Education Provided   Home exercises include: yes, Cervical towel rotation, Scap retractions, Chin tucks  Cardio program: visit 5  Lifting education date: visit 11  Posture/Lumbar roll: discussed    Education provided:   - lifting techniques    Written Home Exercises Provided: Patient instructed to cont prior HEP.  Exercises were reviewed and Chaya was able to demonstrate them prior to the end of the session.  Chaya demonstrated good  understanding of the education provided.     See EMR under Patient Instructions for exercises provided prior visit.      Assessment   Chaay arrived to therapy reporting her usual pain levels in her neck. She continues to have some pain with cervical rotation. Continued with manual therapy to improve tissue extensibility and reduce feelings of tightness. Medx resistance increased to 123 in/lbs. Pt able to complete 15 reps with 3.5/10 RPE.    Patient is making good progress towards established goals.  Pt will continue to benefit from skilled outpatient physical therapy to address the deficits stated in the impairment chart, provide pt/family education and to maximize pt's level of independence in the home and community environment.     Anticipated Barriers for therapy: none  Pt's spiritual, cultural and educational needs considered and pt agreeable to plan of care and goals as stated below:       GOALS: Pt is in agreement with the following goals.     Short term goals: 6 weeks or 10 visits   1.  Pt will demonstratte increased cervical ROM as measured by med ex by 8 degrees from initial test which results in improved  ROM of neck for ease with ADLs and driving  (MET 1/30/2023)  2. Pt will demonstrate independence with reducing or controlling symptoms with ther ex, movement, or position independently, able to reduce pain 1-2 points on pain scale using strategies taught in  therapy  (Partially met)  3. Pt will demonstrate increased MedX average isometric strength value by 15% with  when compared to the initial testing resulting in improved ability to perform bending, lifting, and carrying activities safely, confidently.  (partially met)         Long term goals: 10 weeks or 20 visits  1. Pt will demonstratte increased cervical ROM as measured by med ex by 12 degrees from initial test which results in functional ROM of neck for ease with ADLs and driving   (MET 1/30/2023)  2. Pt will demonstrate increased MedX average isometric strength value  by 30% from initial test to improve ability to lift and carry, and sustain good posture while performing ADL's  (approp and ongoing)  3.Pt will demonstrate reduced pain and improved functional outcomes as reported on the FOTO by reaching a limitation score of < or = 30% or less in order to demonstrate subjective improvement in pt's condition.    (approp and ongoing)  4. Pt will demonstrate independence with reducing or controlling symptoms with ther ex, movement, or position independently, able to reduce pain 2-4 points on pain scale using strategies taught in therapy  (approp and ongoing)  5. Pt will demonstrate independence with the HEP at discharge.   (approp and ongoing)  6.  Pt will be able to stand for at least 1 hour without pain to improve her ability to attend concerts. (approp and ongoing)       Plan   Continue with established Plan of Care towards established PT goals.       Therapist: Gume Bunch, PT  2/15/2023

## 2023-02-22 ENCOUNTER — CLINICAL SUPPORT (OUTPATIENT)
Dept: REHABILITATION | Facility: OTHER | Age: 53
End: 2023-02-22
Attending: NURSE PRACTITIONER
Payer: COMMERCIAL

## 2023-02-22 DIAGNOSIS — R29.3 POOR POSTURE: ICD-10-CM

## 2023-02-22 DIAGNOSIS — R29.898 DECREASED RANGE OF MOTION OF NECK: Primary | ICD-10-CM

## 2023-02-22 DIAGNOSIS — R29.898 IMPAIRED STRENGTH OF NECK MUSCLES: ICD-10-CM

## 2023-02-22 PROCEDURE — 97110 THERAPEUTIC EXERCISES: CPT

## 2023-02-22 NOTE — PLAN OF CARE
"    Outpatient Therapy Updated Plan of Care     Visit Date: 2/22/2023    Name: Chaya Dee  Clinic Number: [id number]    Therapy Diagnosis:   Encounter Diagnoses   Name Primary?    Decreased range of motion of neck Yes    Impaired strength of neck muscles     Poor posture      Physician: Tess Haas FNP    Physician Orders: PT Eval and Treat   Medical Diagnosis from Referral: M54.2 (ICD-10-CM) - Cervicalgia  Evaluation Date: 11/23/2022  Authorization Period Expiration: 11/28/2023  Plan of Care Expiration: 2/23/2023  UPDATED to 03/15/23 3 weeks   Reassessment Due: 3/22/2023  Visit # / Visits authorized: 16 /20    Time In: 12:00 pm   Time Out: 1:00 pm   Total Billable Time: 60 minutes  Insurance type:  Fee for service Insurance Patient    Precautions: Standard    Pattern of pain determined: Pattern 1     Subjective   Chaya reports at work adjustments (standing desk, ergonomic chairs) but that no change in sx presentation as a result.  Patient believes she would not have inc standing tolerance for recreational activities (concerts).  Patient report continue LB discomfort /c sustained positions.  Patient report riding in [employer] and having discomfort that day and through the weekend. Patient state "I am stronger but nothing has helped" in regards to sx presentation.   Patient report HEP compliance and that temporarily sx are relieved.  Patient clarify that sx return /c sustained positions.    Patient report attending Yoga on Monday nights /s inc in LB or cervical discomfort.  Patient report using lifting techniques at work and when loading .      Patient tolerate last session well /c no c/o of excess discomfort   Patient reports their pain to be 3/10 on a 0-10 scale with 0 being no pain and 10 being the worst pain imaginable.  Pain Location: B cervical, L>R     Occupation:  and   Leisure: go to concerts, go out to dinner   Pts goals: be able to not be in " pain, travel, strengthen/gain mobility    Objective     Baseline Isometric Testing on Med X equipment:  Testing administered by PT  Date of testin2022  ROM 42-96 deg   Max Peak Torque 104    Min Peak Torque 79    Flex/Ext Ratio 1.3   % below normative data 52%     [place] Isometric Testing on Med X equipment:  Testing administered by PT  Date of testin2023  ROM  deg   Max Peak Torque 116   Min Peak Torque 89   Flex/Ext Ratio 1.3   % below normative data 40   % change from initial visit 11     Outcomes:  Initial score: 56%  Visit 6 score:52%  Visit 10 score: 54%   DISCHARGE:   Goal:      Assessment     Update:    Patient subjective report of multiple postural aids at work and home indicating improved posture awareness.  Considering patient note no sx change for extended time, tx include force progressions especially /c cervical retractions and retractions /c ext.  Patient note cavitation indicating improved mobility challenge and no inc in discomfort indicating appropriateness of continued performance.    Cervical MedX weight maintained per pt tolerance last visit.  Patient demonstrate comfort /c inc ext range during MedX set up, therefore, inc ext ROM for mobility challenge.   Today pt perform 20 reps at 3.5 RPE indicating improved strength and mobility.  Considering patient max torque at visit 10 reassessment was 116# and today patient tolerate 20 reps at 123#, patient demonstrate sig inc cervical paraspinal strength since MedX reassessment.    Patient educated no use of Wellness program to promote transition to Ind workouts. Patient considering usage but has some concern for upcoming medical bills   Wellness  program       Patient is making good progress towards established goals.  Pt will continue to benefit from skilled outpatient physical therapy to address the deficits stated in the impairment chart, provide pt/family education and to maximize pt's level of independence in the home and  community environment.     Anticipated Barriers for therapy: none  Pt's spiritual, cultural and educational needs considered and pt agreeable to plan of care and goals as stated below:       GOALS: Pt is in agreement with the following goals.     Short term goals: 6 weeks or 10 visits   1.  Pt will demonstratte increased cervical ROM as measured by med ex by 8 degrees from initial test which results in improved  ROM of neck for ease with ADLs and driving  (MET 1/30/2023)  2. Pt will demonstrate independence with reducing or controlling symptoms with ther ex, movement, or position independently, able to reduce pain 1-2 points on pain scale using strategies taught in therapy  (Partially met)  3. Pt will demonstrate increased MedX average isometric strength value by 15% with  when compared to the initial testing resulting in improved ability to perform bending, lifting, and carrying activities safely, confidently.  (partially met)         Long term goals: 10 weeks or 20 visits  1. Pt will demonstratte increased cervical ROM as measured by med ex by 12 degrees from initial test which results in functional ROM of neck for ease with ADLs and driving   (MET 1/30/2023)  2. Pt will demonstrate increased MedX average isometric strength value  by 30% from initial test to improve ability to lift and carry, and sustain good posture while performing ADL's  (approp and ongoing)  3.Pt will demonstrate reduced pain and improved functional outcomes as reported on the FOTO by reaching a limitation score of < or = 30% or less in order to demonstrate subjective improvement in pt's condition.    (approp and ongoing)  4. Pt will demonstrate independence with reducing or controlling symptoms with ther ex, movement, or position independently, able to reduce pain 2-4 points on pain scale using strategies taught in therapy  (approp and ongoing)  5. Pt will demonstrate independence with the HEP at discharge.   (approp and ongoing)  6.  Pt will  "be able to stand for at least 1 hour without pain to improve her ability to attend concerts. (approp and ongoing)    Reasons for Recertification of Therapy:   complete 20 visits per [facility] protocol     Plan     Updated Certification Period: 2/22/2023 to 03/15/23  Recommended Treatment Plan:     Outpatient physical therapy 2x week for 3 weeks or 20 visits to include the following:   - Patient education  - Therapeutic exercise  - Manual therapy  - Performance testing   - Neuromuscular Re-education  - Therapeutic activity   - Modalities    Pt may be seen by PTA as part of the rehabilitation team.     Therapist: Shady Ramsay, PT  2/22/2023    "I certify the need for these services furnished under this plan of treatment and while under my care."    ____________________________________  Physician/Referring Practitioner    _______________  Date of Signature    Shady Ramsay, PT  2/22/2023      I CERTIFY THE NEED FOR THESE SERVICES FURNISHED UNDER THIS PLAN OF TREATMENT AND WHILE UNDER MY CARE    Physician's comments:        Physician's Signature: ___________________________________________________                     "

## 2023-02-22 NOTE — PROGRESS NOTES
"[facility] Physical Therapy Treatment      Name: Chaya Dee  Clinic Number: [id number]    Therapy Diagnosis:   Encounter Diagnoses   Name Primary?    Decreased range of motion of neck Yes    Impaired strength of neck muscles     Poor posture        Physician: Tess Haas FNP    Visit Date: 2/22/2023    Physician Orders: PT Eval and Treat   Medical Diagnosis from Referral: M54.2 (ICD-10-CM) - Cervicalgia  Evaluation Date: 11/23/2022  Authorization Period Expiration: 11/28/2023  Plan of Care Expiration: 2/23/2023  UPDATED to 03/15/23 3 weeks   Reassessment Due: 3/22/2023  Visit # / Visits authorized: 16 /20    Time In: 12:00 pm   Time Out: 1:00 pm   Total Billable Time: 60 minutes  Insurance type:  Fee for service Insurance Patient    Precautions: Standard    Pattern of pain determined: Pattern 1     Subjective   Chaya reports at work adjustments (standing desk, ergonomic chairs) but that no change in sx presentation as a result.  Patient believes she would not have inc standing tolerance for recreational activities ([employer]).  Patient report continue LB discomfort /c sustained positions.  Patient report riding in [employer] and having discomfort that day and through the weekend. Patient state "I am stronger but nothing has helped" in regards to sx presentation.   Patient report HEP compliance and that temporarily sx are relieved.  Patient clarify that sx return /c sustained positions.    Patient report attending Yoga on Monday nights /s inc in LB or cervical discomfort.  Patient report using lifting techniques at work and when loading .      Patient tolerate last session well /c no c/o of excess discomfort   Patient reports their pain to be 3/10 on a 0-10 scale with 0 being no pain and 10 being the worst pain imaginable.  Pain Location: B cervical, L>R     Occupation:  and   Leisure: go to concerts, go out to dinner   Pts goals: be able to not " "be in pain, travel, strengthen/gain mobility    Objective     Baseline Isometric Testing on Med X equipment:  Testing administered by PT  Date of testin2022  ROM 42-96 deg   Max Peak Torque 104    Min Peak Torque 79    Flex/Ext Ratio 1.3   % below normative data 52%     [place] Isometric Testing on Med X equipment:  Testing administered by PT  Date of testin2023  ROM  deg   Max Peak Torque 116   Min Peak Torque 89   Flex/Ext Ratio 1.3   % below normative data 40   % change from initial visit 11       Outcomes:  Initial score: 56%  Visit 6 score:52%  Visit 10 score: 54%   DISCHARGE:   Goal:      Treatment    Pt was instructed in and performed the following:     Chaya received therapeutic exercises to develop/improved posture, cardiovascular endurance, muscular endurance, lumbar/cervical ROM, strength and muscular endurance for 60 minutes including the following exercises:     Chin tucks seated EOB x 10, x 20 /c patient OP   Chin tuck and ext x 10 no towel, x 20 /c patient OP end range slight rotation   UT stretch x 2 20 sec hold  Levator stretch x 2 20 sec hold   Open book stretch x10 ea side    NP to progress repeated motions, plan to return to performance next visit:   Quadruped over theraball W x10,3" T x10,3"  Y x10 #1 ea - cues to engage chin tuck with arm movement  Seated thoracic ext over red FR 15x3"    Not performed 2023 :  Cervical towel rotation stretch 10x 5 sec  No money GTB x20  Rows BTB 2x10  Foam roll supine NP   Chin tucks w/TB  Scap retractions 15x  Standing open books (felt only in LB)  UT stretch 20 sec x 3  Levator stretch 20 sec x 3  Thread the needle x10 ea sie     [facility] 2023   Visit Number 16   VAS Pain Rating 3   Treadmill Time (in min.) 5   Cervical Stretches - Retraction In Lying -   Retraction in Sitting 30   Retraction with Extension 30   Sidebending 4   Rotation -   Scapular Retraction -   Manual Therapy -   Cervical Extension Seat Pad " "-   Seat Adjustment -   Top Dead Center -   Counterweight -   Cervical Flexion 102   Cervical Extension 24   Cervical Peak Torque -   Cervical Weight 123   Repetitions 20   Rating of Perceived Exertion 3.5   Ice - Z Lie (in min.) -       Peripheral muscle strengthening which included 1 set of 15-20 repetitions at a slow, controlled 10-13 second per rep pace focused on strengthening supporting musculature for improved body mechanics and functional mobility.  Pt and therapist focused on proper form during treatment to ensure optimal strengthening of each targeted muscle group.  Machines were utilized including torso rotation, leg extension, leg curl, chest press, upright row. Tricep extension, bicep curl, leg press, and hip abduction added visit 3    Chaya received the following manual therapy techniques for 00 minutes:   Cervical distraction   Sub occipital release   STM posterior/lateral c-spine musculature- np  Manual upper trap stretch bilaterally 2x30"-np    Home Exercises Provided and Patient Education Provided   Home exercises include:   Open books   UT stretch  Levator stretch   Scap retractions,   Chin tucks  Chin tucks /c ext     Cervical towel rotation     Cardio program: visit 5  Lifting education date: visit 11  Posture/Lumbar roll: patient report using chair /c lumbar support function, uses lumbar roll at home    Education provided:       Written Home Exercises Provided: Patient instructed to cont prior HEP.  Exercises were reviewed and Chaya was able to demonstrate them prior to the end of the session.  Chaya demonstrated good  understanding of the education provided.     See EMR under Patient Instructions for exercises provided prior visit.      Assessment     Patient subjective report of multiple postural aids at work and home indicating improved posture awareness.  Considering patient note no sx change for extended time, tx include force progressions especially /c cervical retractions and " retractions /c ext.  Patient note cavitation indicating improved mobility challenge and no inc in discomfort indicating appropriateness of continued performance.    Cervical MedX weight maintained per pt tolerance last visit.  Patient demonstrate comfort /c inc ext range during MedX set up, therefore, inc ext ROM for mobility challenge.   Today pt perform 20 reps at 3.5 RPE indicating improved strength and mobility.  Considering patient max torque at visit 10 reassessment was 116# and today patient tolerate 20 reps at 123#, patient demonstrate sig inc cervical paraspinal strength since MedX reassessment.    Patient educated no use of Wellness program to promote transition to Ind workouts. Patient considering usage but has some concern for upcoming medical bills.    Patient deny ice today, stating she will do at home.        Patient is making good progress towards established goals.  Pt will continue to benefit from skilled outpatient physical therapy to address the deficits stated in the impairment chart, provide pt/family education and to maximize pt's level of independence in the home and community environment.     Anticipated Barriers for therapy: none  Pt's spiritual, cultural and educational needs considered and pt agreeable to plan of care and goals as stated below:       GOALS: Pt is in agreement with the following goals.     Short term goals: 6 weeks or 10 visits   1.  Pt will demonstratte increased cervical ROM as measured by med ex by 8 degrees from initial test which results in improved  ROM of neck for ease with ADLs and driving  (MET 1/30/2023)  2. Pt will demonstrate independence with reducing or controlling symptoms with ther ex, movement, or position independently, able to reduce pain 1-2 points on pain scale using strategies taught in therapy  (Partially met)  3. Pt will demonstrate increased MedX average isometric strength value by 15% with  when compared to the initial testing resulting in  improved ability to perform bending, lifting, and carrying activities safely, confidently.  (partially met)         Long term goals: 10 weeks or 20 visits  1. Pt will demonstratte increased cervical ROM as measured by med ex by 12 degrees from initial test which results in functional ROM of neck for ease with ADLs and driving   (MET 1/30/2023)  2. Pt will demonstrate increased MedX average isometric strength value  by 30% from initial test to improve ability to lift and carry, and sustain good posture while performing ADL's  (approp and ongoing)  3.Pt will demonstrate reduced pain and improved functional outcomes as reported on the FOTO by reaching a limitation score of < or = 30% or less in order to demonstrate subjective improvement in pt's condition.    (approp and ongoing)  4. Pt will demonstrate independence with reducing or controlling symptoms with ther ex, movement, or position independently, able to reduce pain 2-4 points on pain scale using strategies taught in therapy  (approp and ongoing)  5. Pt will demonstrate independence with the HEP at discharge.   (approp and ongoing)  6.  Pt will be able to stand for at least 1 hour without pain to improve her ability to attend concerts. (approp and ongoing)       Plan   Continue with established Plan of Care towards established PT goals.       Therapist: Shady Ramsay, PT  2/22/2023

## 2023-02-24 ENCOUNTER — CLINICAL SUPPORT (OUTPATIENT)
Dept: REHABILITATION | Facility: OTHER | Age: 53
End: 2023-02-24
Attending: NURSE PRACTITIONER
Payer: COMMERCIAL

## 2023-02-24 DIAGNOSIS — R29.898 IMPAIRED STRENGTH OF NECK MUSCLES: ICD-10-CM

## 2023-02-24 DIAGNOSIS — R29.898 DECREASED RANGE OF MOTION OF NECK: Primary | ICD-10-CM

## 2023-02-24 DIAGNOSIS — R29.3 POOR POSTURE: ICD-10-CM

## 2023-02-24 PROCEDURE — 97140 MANUAL THERAPY 1/> REGIONS: CPT

## 2023-02-24 PROCEDURE — 97110 THERAPEUTIC EXERCISES: CPT

## 2023-02-24 NOTE — PROGRESS NOTES
[facility]      Name: Chaya Dee  Clinic Number: [id number]    Therapy Diagnosis:   Encounter Diagnoses   Name Primary?    Decreased range of motion of neck Yes    Impaired strength of neck muscles     Poor posture      Physician: Tess Haas FNP    Visit Date: 2023    Physician Orders: PT Eval and Treat   Medical Diagnosis from Referral: M54.2 (ICD-10-CM) - Cervicalgia  Evaluation Date: 2022  Authorization Period Expiration: 2023  Plan of Care Expiration: 2023  UPDATED to 03/15/23 3 weeks   Reassessment Due: 3/22/2023  Visit # / Visits authorized:     Time In: 11:30 am   Time Out: 12:30 pm   Total Billable Time: 55 minutes  Insurance type:  Fee for service Insurance Patient    Precautions: Standard    Pattern of pain determined: Pattern 1     Subjective   Chaya reports still with some residual soreness from doing morpheus. Her neck pain has gotten better but feels the tightness and soreness at the base of the skull and sides of the neck will not dissipate no matter how much she stretches    Patient tolerate last session well /c no c/o of excess discomfort   Patient reports their pain to be 3/10 on a 0-10 scale with 0 being no pain and 10 being the worst pain imaginable.  Pain Location: B cervical, L>R     Occupation:  and   Leisure: go to concerts, go out to dinner   Pts goals: be able to not be in pain, travel, strengthen/gain mobility    Objective     Baseline Isometric Testing on Med X equipment:  Testing administered by PT  Date of testin2022  ROM 42-96 deg   Max Peak Torque 104    Min Peak Torque 79    Flex/Ext Ratio 1.3   % below normative data 52%     [place] Isometric Testing on Med X equipment:  Testing administered by PT  Date of testin2023  ROM  deg   Max Peak Torque 116   Min Peak Torque 89   Flex/Ext Ratio 1.3   % below normative data 40   % change from initial  "visit 11       Outcomes:  Initial score: 56%  Visit 6 score:52%  Visit 10 score: 54%   DISCHARGE:   Goal:      Treatment    Pt was instructed in and performed the following:     Chaya received therapeutic exercises to develop/improved posture, cardiovascular endurance, muscular endurance, lumbar/cervical ROM, strength and muscular endurance for 45 minutes including the following exercises:     [facility] - Short 2/24/2023   Visit Number 17   VAS Pain Rating 3   Treadmill Time (in min.) 5   Retraction in Lying -   Retraction in Sitting 20   Retraction with Extension 20   Sidebending -   Rotation -   Scapular Retraction 20   Manual Therapy 10   Cervical Extension - Seat Pad -   Seat Adjustment -   Top Dead Center -   Counterweight -   Cervical Flexion -   Cervical Extension -   Cervical Peak Torque -   Cervical Weight 135   Repetitions 15   Rating of Perceived Exertion 4         Chin tucks seated EOB x 10, x 20 /c patient OP   Chin tuck and ext x 10 no towel, x 20 /c patient OP end range slight rotation   UT stretch x 2 20 sec hold  Levator stretch x 2 20 sec hold   Open book stretch x10 ea side    NP to progress repeated motions, plan to return to performance next visit:   Quadruped over theraball W x10,3" T x10,3"  Y x10 #1 ea - cues to engage chin tuck with arm movement  Seated thoracic ext over red FR 15x3"    Not performed 2/22/2023 :  Cervical towel rotation stretch 10x 5 sec  No money GTB x20  Rows BTB 2x10  Foam roll supine NP   Chin tucks w/TB  Scap retractions 15x  Standing open books (felt only in LB)  UT stretch 20 sec x 3  Levator stretch 20 sec x 3  Thread the needle x10 ea sie         Peripheral muscle strengthening which included 1 set of 15-20 repetitions at a slow, controlled 10-13 second per rep pace focused on strengthening supporting musculature for improved body mechanics and functional mobility.  Pt and therapist focused on proper form during treatment to ensure optimal " "strengthening of each targeted muscle group.  Machines were utilized including torso rotation, leg extension, leg curl, chest press, upright row. Tricep extension, bicep curl, leg press, and hip abduction added visit 3    Chaya received the following manual therapy techniques for 10 minutes:   Cervical distraction   Sub occipital release   STM posterior/lateral c-spine musculature  Manual upper trap stretch bilaterally 2x30"    Home Exercises Provided and Patient Education Provided   Home exercises include:   Open books   UT stretch  Levator stretch   Scap retractions,   Chin tucks  Chin tucks /c ext     Cervical towel rotation     Cardio program: visit 5  Lifting education date: visit 11  Posture/Lumbar roll: patient report using chair /c lumbar support function, uses lumbar roll at home    Education provided:       Written Home Exercises Provided: Patient instructed to cont prior HEP.  Exercises were reviewed and Chaya was able to demonstrate them prior to the end of the session.  Chaya demonstrated good  understanding of the education provided.     See EMR under Patient Instructions for exercises provided prior visit.      Assessment     Patient presents today with continued complaints of cervical tightness and soreness, but reported decreased after manual techniques. Educated on use of 2 tennis balls or Craniocradle to self release tight cervical musculature, pt reporting relief with use of both. Improved tolerance and ROM noted with repeated cervical retraction/extension. Pt also extending far past current max extension on medx, attempt to increased ROM again next visit. Medx resistance progressed to 135 in/lbs and pt was able to complete 15 reps with 3.5/10 RPE. Continue to progress as tolerated. Pt again declines ice, plans to do at home.       Patient is making good progress towards established goals.  Pt will continue to benefit from skilled outpatient physical therapy to address the deficits " stated in the impairment chart, provide pt/family education and to maximize pt's level of independence in the home and community environment.     Anticipated Barriers for therapy: none  Pt's spiritual, cultural and educational needs considered and pt agreeable to plan of care and goals as stated below:       GOALS: Pt is in agreement with the following goals.     Short term goals: 6 weeks or 10 visits   1.  Pt will demonstratte increased cervical ROM as measured by med ex by 8 degrees from initial test which results in improved  ROM of neck for ease with ADLs and driving  (MET 1/30/2023)  2. Pt will demonstrate independence with reducing or controlling symptoms with ther ex, movement, or position independently, able to reduce pain 1-2 points on pain scale using strategies taught in therapy  (Partially met)  3. Pt will demonstrate increased MedX average isometric strength value by 15% with  when compared to the initial testing resulting in improved ability to perform bending, lifting, and carrying activities safely, confidently.  (partially met)         Long term goals: 10 weeks or 20 visits  1. Pt will demonstratte increased cervical ROM as measured by med ex by 12 degrees from initial test which results in functional ROM of neck for ease with ADLs and driving   (MET 1/30/2023)  2. Pt will demonstrate increased MedX average isometric strength value  by 30% from initial test to improve ability to lift and carry, and sustain good posture while performing ADL's  (approp and ongoing)  3.Pt will demonstrate reduced pain and improved functional outcomes as reported on the FOTO by reaching a limitation score of < or = 30% or less in order to demonstrate subjective improvement in pt's condition.    (approp and ongoing)  4. Pt will demonstrate independence with reducing or controlling symptoms with ther ex, movement, or position independently, able to reduce pain 2-4 points on pain scale using strategies taught in therapy   (approp and ongoing)  5. Pt will demonstrate independence with the HEP at discharge.   (approp and ongoing)  6.  Pt will be able to stand for at least 1 hour without pain to improve her ability to attend concerts. (approp and ongoing)       Plan   Continue with established Plan of Care towards established PT goals.     Therapist: Kristofer Montgomery, PT  2/24/2023

## 2023-02-27 ENCOUNTER — CLINICAL SUPPORT (OUTPATIENT)
Dept: REHABILITATION | Facility: OTHER | Age: 53
End: 2023-02-27
Attending: NURSE PRACTITIONER
Payer: COMMERCIAL

## 2023-02-27 DIAGNOSIS — R29.898 DECREASED RANGE OF MOTION OF NECK: Primary | ICD-10-CM

## 2023-02-27 DIAGNOSIS — R29.898 IMPAIRED STRENGTH OF NECK MUSCLES: ICD-10-CM

## 2023-02-27 DIAGNOSIS — R29.3 POOR POSTURE: ICD-10-CM

## 2023-02-27 PROCEDURE — 97140 MANUAL THERAPY 1/> REGIONS: CPT

## 2023-02-27 PROCEDURE — 97110 THERAPEUTIC EXERCISES: CPT

## 2023-02-27 NOTE — PROGRESS NOTES
[facility]      Name: Chaya Dee  Clinic Number: [id number]    Therapy Diagnosis:   Encounter Diagnoses   Name Primary?    Decreased range of motion of neck Yes    Impaired strength of neck muscles     Poor posture        Physician: Tess Haas FNP    Visit Date: 2023    Physician Orders: PT Eval and Treat   Medical Diagnosis from Referral: M54.2 (ICD-10-CM) - Cervicalgia  Evaluation Date: 2022  Authorization Period Expiration: 2023  Plan of Care Expiration: 2023  UPDATED to 03/15/23 3 weeks   Reassessment Due: 3/22/2023  Visit # / Visits authorized:     Time In: 10:00 am   Time Out: 11:00 pm   Total Billable Time: 55 minutes  Insurance type:  Fee for service Insurance Patient    Precautions: Standard    Pattern of pain determined: Pattern 1     Subjective   Chaya reports her neck is feeling less stiff from [place], but she is still having some stiffness on the side of her neck.    Patient tolerate last session well /c no c/o of excess discomfort   Patient reports their pain to be 3/10 on a 0-10 scale with 0 being no pain and 10 being the worst pain imaginable.  Pain Location: B cervical, L>R     Occupation:  and   Leisure: go to concerts, go out to dinner   Pts goals: be able to not be in pain, travel, strengthen/gain mobility    Objective     Baseline Isometric Testing on Med X equipment:  Testing administered by PT  Date of testin2022  ROM 42-96 deg   Max Peak Torque 104    Min Peak Torque 79    Flex/Ext Ratio 1.3   % below normative data 52%     [place] Isometric Testing on Med X equipment:  Testing administered by PT  Date of testin2023  ROM  deg   Max Peak Torque 116   Min Peak Torque 89   Flex/Ext Ratio 1.3   % below normative data 40   % change from initial visit 11       Outcomes:  Initial score: 56%  Visit 6 score:52%  Visit 10 score: 54%   DISCHARGE:  "  Goal:      Treatment    Pt was instructed in and performed the following:     Chaya received therapeutic exercises to develop/improved posture, cardiovascular endurance, muscular endurance, lumbar/cervical ROM, strength and muscular endurance for 45 minutes including the following exercises:     [facility] 2/28/2023   Visit Number 18   VAS Pain Rating 3   Treadmill Time (in min.) 5   Cervical Stretches - Retraction In Lying -   Retraction in Sitting 20   Retraction with Extension 20   Sidebending -   Rotation -   Scapular Retraction 20   Manual Therapy 10   Cervical Extension Seat Pad -   Seat Adjustment -   Top Dead Center -   Counterweight -   Cervical Flexion -   Cervical Extension -   Cervical Peak Torque -   Cervical Weight 135   Repetitions 20   Rating of Perceived Exertion 3   Ice - Z Lie (in min.) 5         Chin tucks seated EOB x 10, x 20 /c patient OP   Chin tuck and ext x 10 no towel, x 20 /c patient OP end range slight rotation   UT stretch x 2 20 sec hold  Levator stretch x 2 20 sec hold   Open book stretch x10 ea side    NP to progress repeated motions, plan to return to performance next visit:   Quadruped over theraball W x10,3" T x10,3"  Y x10 #1 ea - cues to engage chin tuck with arm movement  Seated thoracic ext over red FR 15x3"    Not performed 2/22/2023 :  Cervical towel rotation stretch 10x 5 sec  No money GTB x20  Rows BTB 2x10  Foam roll supine NP   Chin tucks w/TB  Scap retractions 15x  Standing open books (felt only in LB)  UT stretch 20 sec x 3  Levator stretch 20 sec x 3  Thread the needle x10 ea sie         Peripheral muscle strengthening which included 1 set of 15-20 repetitions at a slow, controlled 10-13 second per rep pace focused on strengthening supporting musculature for improved body mechanics and functional mobility.  Pt and therapist focused on proper form during treatment to ensure optimal strengthening of each targeted muscle group.  Machines were utilized " "including torso rotation, leg extension, leg curl, chest press, upright row. Tricep extension, bicep curl, leg press, and hip abduction added visit 3    Chaya received the following manual therapy techniques for 10 minutes:   Cervical distraction   Sub occipital release - NP  STM posterior/lateral c-spine musculature  Manual upper trap stretch bilaterally 2x30"- NP    Home Exercises Provided and Patient Education Provided   Home exercises include:   Open books   UT stretch  Levator stretch   Scap retractions,   Chin tucks  Chin tucks /c ext     Cervical towel rotation     Cardio program: visit 5  Lifting education date: visit 11  Posture/Lumbar roll: patient report using chair /c lumbar support function, uses lumbar roll at home    Education provided:       Written Home Exercises Provided: Patient instructed to cont prior HEP.  Exercises were reviewed and Chaya was able to demonstrate them prior to the end of the session.  Chaya demonstrated good  understanding of the education provided.     See EMR under Patient Instructions for exercises provided prior visit.      Assessment     Patient arrived to therapy continuing to report some tightness on the side of her neck. Started with STM and trigger point release to cervical musculature which pt reported relieved some of her stiffness. Continued with cervical mobility and strengthening, which pt tolerated well. Medx resistance maintained at 135 in/lbs and pt was able to complete 20 reps with 3/10 RPE. Required cues for speed. Continue to progress as tolerated.       Patient is making good progress towards established goals.  Pt will continue to benefit from skilled outpatient physical therapy to address the deficits stated in the impairment chart, provide pt/family education and to maximize pt's level of independence in the home and community environment.     Anticipated Barriers for therapy: none  Pt's spiritual, cultural and educational needs considered " and pt agreeable to plan of care and goals as stated below:       GOALS: Pt is in agreement with the following goals.     Short term goals: 6 weeks or 10 visits   1.  Pt will demonstratte increased cervical ROM as measured by med ex by 8 degrees from initial test which results in improved  ROM of neck for ease with ADLs and driving  (MET 1/30/2023)  2. Pt will demonstrate independence with reducing or controlling symptoms with ther ex, movement, or position independently, able to reduce pain 1-2 points on pain scale using strategies taught in therapy  (Partially met)  3. Pt will demonstrate increased MedX average isometric strength value by 15% with  when compared to the initial testing resulting in improved ability to perform bending, lifting, and carrying activities safely, confidently.  (partially met)         Long term goals: 10 weeks or 20 visits  1. Pt will demonstratte increased cervical ROM as measured by med ex by 12 degrees from initial test which results in functional ROM of neck for ease with ADLs and driving   (MET 1/30/2023)  2. Pt will demonstrate increased MedX average isometric strength value  by 30% from initial test to improve ability to lift and carry, and sustain good posture while performing ADL's  (approp and ongoing)  3.Pt will demonstrate reduced pain and improved functional outcomes as reported on the FOTO by reaching a limitation score of < or = 30% or less in order to demonstrate subjective improvement in pt's condition.    (approp and ongoing)  4. Pt will demonstrate independence with reducing or controlling symptoms with ther ex, movement, or position independently, able to reduce pain 2-4 points on pain scale using strategies taught in therapy  (approp and ongoing)  5. Pt will demonstrate independence with the HEP at discharge.   (approp and ongoing)  6.  Pt will be able to stand for at least 1 hour without pain to improve her ability to attend concerts. (approp and ongoing)       Plan    Continue with established Plan of Care towards established PT goals.     Therapist: Gume Bunch, PT  2/27/2023

## 2023-03-01 ENCOUNTER — CLINICAL SUPPORT (OUTPATIENT)
Dept: REHABILITATION | Facility: OTHER | Age: 53
End: 2023-03-01
Attending: NURSE PRACTITIONER
Payer: COMMERCIAL

## 2023-03-01 DIAGNOSIS — R29.3 POOR POSTURE: ICD-10-CM

## 2023-03-01 DIAGNOSIS — R29.898 DECREASED RANGE OF MOTION OF NECK: Primary | ICD-10-CM

## 2023-03-01 DIAGNOSIS — R29.898 IMPAIRED STRENGTH OF NECK MUSCLES: ICD-10-CM

## 2023-03-01 PROCEDURE — 97110 THERAPEUTIC EXERCISES: CPT | Mod: CQ

## 2023-03-01 NOTE — PROGRESS NOTES
[facility]      Name: Chaya Dee  Clinic Number: [id number]    Therapy Diagnosis:   Encounter Diagnoses   Name Primary?    Decreased range of motion of neck Yes    Impaired strength of neck muscles     Poor posture        Physician: Tess Haas FNP    Visit Date: 3/1/2023    Physician Orders: PT Eval and Treat   Medical Diagnosis from Referral: M54.2 (ICD-10-CM) - Cervicalgia  Evaluation Date: 2022  Authorization Period Expiration: 2023  Plan of Care Expiration: 2023  UPDATED to 03/15/23 3 weeks   Reassessment Due: 3/22/2023  Visit # / Visits authorized:     Time In: 11:00 am   Time Out: 12:00 pm   Total Billable Time: 55 minutes  Insurance type:  Fee for service Insurance Patient    Precautions: Standard    Pattern of pain determined: Pattern 1     Subjective   Chaya reports tension on R side of her neck.     Patient tolerate last session well /c no c/o of excess discomfort   Patient reports their pain to be 3/10 on a 0-10 scale with 0 being no pain and 10 being the worst pain imaginable.  Pain Location: B cervical, L>R     Occupation:  and   Leisure: go to concerts, go out to dinner   Pts goals: be able to not be in pain, travel, strengthen/gain mobility    Objective     Baseline Isometric Testing on Med X equipment:  Testing administered by PT  Date of testin2022  ROM 42-96 deg   Max Peak Torque 104    Min Peak Torque 79    Flex/Ext Ratio 1.3   % below normative data 52%     [place] Isometric Testing on Med X equipment:  Testing administered by PT  Date of testin2023  ROM  deg   Max Peak Torque 116   Min Peak Torque 89   Flex/Ext Ratio 1.3   % below normative data 40   % change from initial visit 11       Outcomes:  Initial score: 56%  Visit 6 score:52%  Visit 10 score: 54%   DISCHARGE:   Goal:      Treatment    Pt was instructed in and performed the following:     Chaya  "received therapeutic exercises to develop/improved posture, cardiovascular endurance, muscular endurance, lumbar/cervical ROM, strength and muscular endurance for 45 minutes including the following exercises:     [facility] - Short 3/1/2023   Visit Number 19   VAS Pain Rating 3   Treadmill Time (in min.) -   Retraction in Lying -   Retraction in Sitting -   Retraction with Extension -   Sidebending -   Rotation -   Scapular Retraction -   Manual Therapy -   Cervical Extension - Seat Pad -   Seat Adjustment -   Top Dead Center -   Counterweight -   Cervical Flexion -   Cervical Extension -   Cervical Peak Torque -   Cervical Weight 138   Repetitions 20   Rating of Perceived Exertion 3      5 min of manual to SCM insertion occipitals and UT    Chin tucks seated EOB x 10, x 20 /c patient OP   Chin tuck and ext x 10 no towel, x 20 /c patient OP end range slight rotation   UT stretch x 2 20 sec hold  Levator stretch x 2 20 sec hold   Open book stretch x10 ea side    NP to progress repeated motions, plan to return to performance next visit:   Quadruped over theraball W x10,3" T x10,3"  Y x10 #1 ea - cues to engage chin tuck with arm movement  Seated thoracic ext over red FR 15x3"    Not performed 2/22/2023 :  Cervical towel rotation stretch 10x 5 sec  No money GTB x20  Rows BTB 2x10  Foam roll supine NP   Chin tucks w/TB  Scap retractions 15x  Standing open books (felt only in LB)  UT stretch 20 sec x 3  Levator stretch 20 sec x 3  Thread the needle x10 ea sie         Peripheral muscle strengthening which included 1 set of 15-20 repetitions at a slow, controlled 10-13 second per rep pace focused on strengthening supporting musculature for improved body mechanics and functional mobility.  Pt and therapist focused on proper form during treatment to ensure optimal strengthening of each targeted muscle group.  Machines were utilized including torso rotation, leg extension, leg curl, chest press, upright row. Tricep " "extension, bicep curl, leg press, and hip abduction added visit 3    Chaya received the following manual therapy techniques for 10 minutes:   Cervical distraction   Sub occipital release - NP  STM posterior/lateral c-spine musculature  Manual upper trap stretch bilaterally 2x30"- NP    Home Exercises Provided and Patient Education Provided   Home exercises include:   Open books   UT stretch  Levator stretch   Scap retractions,   Chin tucks  Chin tucks /c ext     Cervical towel rotation     Cardio program: visit 5  Lifting education date: visit 11  Posture/Lumbar roll: patient report using chair /c lumbar support function, uses lumbar roll at home    Education provided:       Written Home Exercises Provided: Patient instructed to cont prior HEP.  Exercises were reviewed and Chaya was able to demonstrate them prior to the end of the session.  Chaya demonstrated good  understanding of the education provided.     See EMR under Patient Instructions for exercises provided prior visit.      Assessment   Patient arrived to therapy continuing to report R sided tension on her neck. Started with STM and trigger point release to cervical musculature following STM patient reports a dec in pain. Continued with cervical mobility and strengthening, which pt tolerated well. Medx resistance is increased to 138 ft/lbs and pt was able to complete 20 reps with 3/10 RPE. Required cues for speed. Continue to progress as tolerated.     Patient is making good progress towards established goals.  Pt will continue to benefit from skilled outpatient physical therapy to address the deficits stated in the impairment chart, provide pt/family education and to maximize pt's level of independence in the home and community environment.     Anticipated Barriers for therapy: none  Pt's spiritual, cultural and educational needs considered and pt agreeable to plan of care and goals as stated below:       GOALS: Pt is in agreement with the " following goals.     Short term goals: 6 weeks or 10 visits   1.  Pt will demonstratte increased cervical ROM as measured by med ex by 8 degrees from initial test which results in improved  ROM of neck for ease with ADLs and driving  (MET 1/30/2023)  2. Pt will demonstrate independence with reducing or controlling symptoms with ther ex, movement, or position independently, able to reduce pain 1-2 points on pain scale using strategies taught in therapy  (Partially met)  3. Pt will demonstrate increased MedX average isometric strength value by 15% with  when compared to the initial testing resulting in improved ability to perform bending, lifting, and carrying activities safely, confidently.  (partially met)         Long term goals: 10 weeks or 20 visits  1. Pt will demonstratte increased cervical ROM as measured by med ex by 12 degrees from initial test which results in functional ROM of neck for ease with ADLs and driving   (MET 1/30/2023)  2. Pt will demonstrate increased MedX average isometric strength value  by 30% from initial test to improve ability to lift and carry, and sustain good posture while performing ADL's  (approp and ongoing)  3.Pt will demonstrate reduced pain and improved functional outcomes as reported on the FOTO by reaching a limitation score of < or = 30% or less in order to demonstrate subjective improvement in pt's condition.    (approp and ongoing)  4. Pt will demonstrate independence with reducing or controlling symptoms with ther ex, movement, or position independently, able to reduce pain 2-4 points on pain scale using strategies taught in therapy  (approp and ongoing)  5. Pt will demonstrate independence with the HEP at discharge.   (approp and ongoing)  6.  Pt will be able to stand for at least 1 hour without pain to improve her ability to attend concerts. (approp and ongoing)       Plan   Continue with established Plan of Care towards established PT goals.     Therapist: Geronimo  Sharon, PTA  3/1/2023

## 2023-03-08 ENCOUNTER — CLINICAL SUPPORT (OUTPATIENT)
Dept: REHABILITATION | Facility: OTHER | Age: 53
End: 2023-03-08
Attending: NURSE PRACTITIONER
Payer: COMMERCIAL

## 2023-03-08 DIAGNOSIS — R29.898 IMPAIRED STRENGTH OF NECK MUSCLES: ICD-10-CM

## 2023-03-08 DIAGNOSIS — R29.898 DECREASED RANGE OF MOTION OF NECK: Primary | ICD-10-CM

## 2023-03-08 DIAGNOSIS — R29.3 POOR POSTURE: ICD-10-CM

## 2023-03-08 PROCEDURE — 97750 PHYSICAL PERFORMANCE TEST: CPT

## 2023-03-08 PROCEDURE — 97110 THERAPEUTIC EXERCISES: CPT

## 2023-03-08 NOTE — PROGRESS NOTES
[facility] Healthy Back Physical Therapy Treatment      Name: Chaya Dee  Clinic Number: [id number]    Therapy Diagnosis:   Encounter Diagnoses   Name Primary?    Decreased range of motion of neck Yes    Impaired strength of neck muscles     Poor posture        Physician: Tess Haas FNP    Visit Date: 3/8/2023    Physician Orders: PT Eval and Treat   Medical Diagnosis from Referral: M54.2 (ICD-10-CM) - Cervicalgia  Evaluation Date: 2022  Authorization Period Expiration: 2023  Plan of Care Expiration: 2023  UPDATED to 03/15/23 3 weeks   Reassessment Due: 3/22/2023  Visit # / Visits authorized:     Time In: 1130  Time Out:   Total Billable Time: 55 minutes  Insurance type:  Fee for service Insurance Patient    Precautions: Standard    Pattern of pain determined: Pattern 1     Subjective   Chaya reports soreness on R sided low back after sitting in car for 6 hrs on Monday. Some tightness in the c-spine at start of session.     Patient tolerate last session well /c no c/o of excess discomfort   Patient reports their pain to be 3/10 on a 0-10 scale with 0 being no pain and 10 being the worst pain imaginable.  Pain Location: B cervical, L>R     Occupation:  and   Leisure: go to concerts, go out to dinner   Pts goals: be able to not be in pain, travel, strengthen/gain mobility    Objective     Baseline Isometric Testing on Med X equipment:  Testing administered by PT  Date of testin2022  ROM 42-96 deg   Max Peak Torque 104    Min Peak Torque 79    Flex/Ext Ratio 1.3   % below normative data 52%     [place] Isometric Testing on Med X equipment:  Testing administered by PT  Date of testin2023  ROM  deg   Max Peak Torque 116   Min Peak Torque 89   Flex/Ext Ratio 1.3   % below normative data 40   % change from initial visit 11     Discharge Isometric Testing on Med X equipment:  Testing administered by PT  Date of testing:  "3/8/2023  ROM  deg   Max Peak Torque 220   Min Peak Torque 113   Flex/Ext Ratio 1.94   % below normative data 31   % change from initial visit 50     Outcomes:  Initial score: 56%  Visit 6 score:52%  Visit 10 score: 54%   DISCHARGE: 51%  Goal: 30%    Treatment    Pt was instructed in and performed the following:     Chaya received therapeutic exercises to develop/improved posture, cardiovascular endurance, muscular endurance, lumbar/cervical ROM, strength and muscular endurance for 45 minutes including the following exercises:     HealthyBack Therapy 3/8/2023   Visit Number 20   VAS Pain Rating 3   Treadmill Time (in min.) 5   Cervical Stretches - Retraction In Lying -   Retraction in Sitting 20   Retraction with Extension 20   Sidebending -   Rotation -   Scapular Retraction -   Manual Therapy -   Cervical Extension Seat Pad -   Seat Adjustment -   Top Dead Center -   Counterweight -   Cervical Flexion 102   Cervical Extension 24   Cervical Peak Torque 220   Cervical Weight 80   Repetitions 5   Rating of Perceived Exertion 3   Ice - Z Lie (in min.) 5     Chin tucks seated EOB x 10, x 20 /c patient OP   Chin tuck and ext x 10 no towel, x 20 /c patient OP end range slight rotation   UT stretch x 2 20 sec hold  Levator stretch x 2 20 sec hold   Open book stretch x10 ea side  Seated thoracic ext over red FR 15x3"    Not performed 2/22/2023 :  Cervical towel rotation stretch 10x 5 sec  No money GTB x20  Rows BTB 2x10  Foam roll supine NP   Chin tucks w/TB  Scap retractions 15x  Standing open books (felt only in LB)  UT stretch 20 sec x 3  Levator stretch 20 sec x 3  Thread the needle x10 ea   Quadruped over theraball W x10,3" T x10,3"  Y x10 #1 ea - cues to engage chin tuck with arm movement     Peripheral muscle strengthening which included 1 set of 15-20 repetitions at a slow, controlled 10-13 second per rep pace focused on strengthening supporting musculature for improved body mechanics and functional " "mobility.  Pt and therapist focused on proper form during treatment to ensure optimal strengthening of each targeted muscle group.  Machines were utilized including torso rotation, leg extension, leg curl, chest press, upright row. Tricep extension, bicep curl, leg press, and hip abduction added visit 3    Chaya received the following manual therapy techniques for 0 minutes:   Cervical distraction   Sub occipital release - NP  STM posterior/lateral c-spine musculature  Manual upper trap stretch bilaterally 2x30"- NP    Home Exercises Provided and Patient Education Provided   Home exercises include:   Open books   UT stretch  Levator stretch   Scap retractions,   Chin tucks  Chin tucks /c ext     Cervical towel rotation     Cardio program: visit 5  Lifting education date: visit 11  Posture/Lumbar roll: patient report using chair /c lumbar support function, uses lumbar roll at home    Education provided:   Pt educated on progress throughout sessions and reviewed home program, adding in exercises and going over form. Discussed continuing on with wellness and starting at local gym. Pt is on the fence about wellness but is going to continue at local gym.     Written Home Exercises Provided: Patient instructed to cont prior HEP.  Exercises were reviewed and Chaya was able to demonstrate them prior to the end of the session.  Chaya demonstrated good  understanding of the education provided.     See EMR under Patient Instructions for exercises provided prior visit.    Assessment   Patient has attended 20 visits of the HealthyBack program for aerobic work, med ex isometric testing with dynamic strengthening on med ex equipment for spine, whole body strengthening on med ex equipment, HEP, education.  Patient has completed Healthy Back Program and is ready to be transitioned into wellness program.  Importance of wellness program, and attending 1/week to maintain strength stressed.  Importance of continuing there ex " and body mech and ergonomics stressed.   Patient demonstrates improvement in ability to reduce symptoms, improved posture, improved ROM and improved strength.   Reviewed HEP, and importance of maintaining a healthy spine through continued stretching and performance of HEP, good posture, good ergonomics, good body mech with ADL, leisure, and work.  Discharge handout with HEP given, and discussed aspects of exercise program, cardio, strengthening, and stretching.    Patient expressed understanding information given.  Patient plans to attend wellness and is ready to transition to wellness.    -Improved cervical ROM,  initially on med ex test  and currently   -Improved strength at each test point on lumbar med ex IM test with 50% average improvement noted with decrease in symptoms.   -Initial outcome tool score 56% and current outcome tool score  51% indicating reduced pain and improved function          Patient is making good progress towards established goals.  Pt will continue to benefit from skilled outpatient physical therapy to address the deficits stated in the impairment chart, provide pt/family education and to maximize pt's level of independence in the home and community environment.     Anticipated Barriers for therapy: none  Pt's spiritual, cultural and educational needs considered and pt agreeable to plan of care and goals as stated below:     GOALS: Pt is in agreement with the following goals.     Short term goals: 6 weeks or 10 visits   1.  Pt will demonstratte increased cervical ROM as measured by med ex by 8 degrees from initial test which results in improved  ROM of neck for ease with ADLs and driving  (MET 1/30/2023)  2. Pt will demonstrate independence with reducing or controlling symptoms with ther ex, movement, or position independently, able to reduce pain 1-2 points on pain scale using strategies taught in therapy  (MET 3/8/2023)  3. Pt will demonstrate increased MedX average  isometric strength value by 15% with  when compared to the initial testing resulting in improved ability to perform bending, lifting, and carrying activities safely, confidently.  (MET 3/8/2023)      Long term goals: 10 weeks or 20 visits  1. Pt will demonstratte increased cervical ROM as measured by med ex by 12 degrees from initial test which results in functional ROM of neck for ease with ADLs and driving   (MET 1/30/2023)  2. Pt will demonstrate increased MedX average isometric strength value  by 30% from initial test to improve ability to lift and carry, and sustain good posture while performing ADL's  (MET 3/8/2023)  3.Pt will demonstrate reduced pain and improved functional outcomes as reported on the FOTO by reaching a limitation score of < or = 30% or less in order to demonstrate subjective improvement in pt's condition.    (NOT MET)  4. Pt will demonstrate independence with reducing or controlling symptoms with ther ex, movement, or position independently, able to reduce pain 2-4 points on pain scale using strategies taught in therapy  (MET 3/8/2023)  5. Pt will demonstrate independence with the HEP at discharge.   (MET 3/8/2023)  6.  Pt will be able to stand for at least 1 hour without pain to improve her ability to attend concerts. (MET 3/8/2023)     Evelin   Chaya plans to continue with home program at local fitness center.      Therapist: Jessica Lal, PT  3/8/2023

## 2023-03-10 ENCOUNTER — TELEPHONE (OUTPATIENT)
Dept: PAIN MEDICINE | Facility: CLINIC | Age: 53
End: 2023-03-10
Payer: COMMERCIAL

## 2023-03-10 ENCOUNTER — PATIENT MESSAGE (OUTPATIENT)
Dept: PAIN MEDICINE | Facility: OTHER | Age: 53
End: 2023-03-10
Payer: COMMERCIAL

## 2023-03-10 ENCOUNTER — OFFICE VISIT (OUTPATIENT)
Dept: PAIN MEDICINE | Facility: CLINIC | Age: 53
End: 2023-03-10
Payer: COMMERCIAL

## 2023-03-10 ENCOUNTER — OFFICE VISIT (OUTPATIENT)
Dept: NEUROLOGY | Facility: CLINIC | Age: 53
End: 2023-03-10
Payer: COMMERCIAL

## 2023-03-10 VITALS
DIASTOLIC BLOOD PRESSURE: 78 MMHG | HEART RATE: 82 BPM | BODY MASS INDEX: 24.07 KG/M2 | SYSTOLIC BLOOD PRESSURE: 120 MMHG | WEIGHT: 141 LBS | HEIGHT: 64 IN

## 2023-03-10 DIAGNOSIS — M51.36 DDD (DEGENERATIVE DISC DISEASE), LUMBAR: ICD-10-CM

## 2023-03-10 DIAGNOSIS — M53.3 COCCYDYNIA: ICD-10-CM

## 2023-03-10 DIAGNOSIS — M54.50 LOW BACK PAIN, UNSPECIFIED BACK PAIN LATERALITY, UNSPECIFIED CHRONICITY, UNSPECIFIED WHETHER SCIATICA PRESENT: ICD-10-CM

## 2023-03-10 DIAGNOSIS — G43.019 MIGRAINE WITHOUT AURA, INTRACTABLE, WITHOUT STATUS MIGRAINOSUS: Primary | ICD-10-CM

## 2023-03-10 DIAGNOSIS — G43.009 MIGRAINE WITHOUT AURA AND WITHOUT STATUS MIGRAINOSUS, NOT INTRACTABLE: ICD-10-CM

## 2023-03-10 DIAGNOSIS — M53.3 SACROILIAC DYSFUNCTION: ICD-10-CM

## 2023-03-10 DIAGNOSIS — M54.12 CERVICAL RADICULOPATHY: ICD-10-CM

## 2023-03-10 DIAGNOSIS — M47.816 SPONDYLOSIS OF LUMBAR REGION WITHOUT MYELOPATHY OR RADICULOPATHY: Primary | ICD-10-CM

## 2023-03-10 PROCEDURE — 99214 OFFICE O/P EST MOD 30 MIN: CPT | Mod: 95,,, | Performed by: NURSE PRACTITIONER

## 2023-03-10 PROCEDURE — 99214 PR OFFICE/OUTPT VISIT, EST, LEVL IV, 30-39 MIN: ICD-10-PCS | Mod: S$GLB,,, | Performed by: NURSE PRACTITIONER

## 2023-03-10 PROCEDURE — 99999 PR PBB SHADOW E&M-EST. PATIENT-LVL III: CPT | Mod: PBBFAC,,, | Performed by: NURSE PRACTITIONER

## 2023-03-10 PROCEDURE — 99999 PR PBB SHADOW E&M-EST. PATIENT-LVL III: ICD-10-PCS | Mod: PBBFAC,,, | Performed by: NURSE PRACTITIONER

## 2023-03-10 PROCEDURE — 99214 OFFICE O/P EST MOD 30 MIN: CPT | Mod: S$GLB,,, | Performed by: NURSE PRACTITIONER

## 2023-03-10 PROCEDURE — 99214 PR OFFICE/OUTPT VISIT, EST, LEVL IV, 30-39 MIN: ICD-10-PCS | Mod: 95,,, | Performed by: NURSE PRACTITIONER

## 2023-03-10 RX ORDER — TOPIRAMATE 50 MG/1
50 TABLET, FILM COATED ORAL 2 TIMES DAILY
Qty: 180 TABLET | Refills: 1 | Status: SHIPPED | OUTPATIENT
Start: 2023-03-10 | End: 2023-06-26 | Stop reason: SDUPTHER

## 2023-03-10 NOTE — PROGRESS NOTES
"Established Patient   SUBJECTIVE:  Patient ID: Chaya Dee   Chief Complaint: Follow-up    History of Present Illness:  Chaya Dee is a 53 y.o. female who presents for follow-up of headaches via virtual visit.     The patient location is: in [place]   The chief complaint leading to consultation is: Follow-up  Visit type: Virtual visit with synchronous audio and video  Total time spent with patient: 15 minutes  Each patient to whom he or she provides medical services by telemedicine is:  (1) informed of the relationship between the physician and patient and the respective role of any other health care provider with respect to management of the patient; and (2) notified that he or she may decline to receive medical services by telemedicine and may withdraw from such care at any time.    Recommendations made at last Office Visit on 12/28/22:  - Start Emgality 240 mg SQ loading dose followed by 120 mg SQ monthly injections.   - Continue topiramate 50 mg bid   - Trial baclofen 5-10 mg morning and afternoon, could continue tizanidine nightly   - Future considerations - botox, compounded topical cream   - For acute migraines - maxalt 10 mg mlt    - Risks, benefits, and potential side effects of emgality discussed  - Alternative treatment options offered   - Continue regular f/up with PT and orthopedics for management of neck/back pain   - RTC in 2 months or sooner if needed    03/10/2023 - Interval History:  Has completed loading dose emgality and one maintenance dose of emgality, due for second maintenance dose in two days.  Since starting Emgality, migraines have greatly improved, went from having a daily headache down to only 5 headache days per month.  Headaches are lasting only an hour in duration.  Feels headaches are "totally manageable".    Completed healthy back and neck physical therapy two days ago, has f/up appt later today with pain medicine to decide how to proceed as far as " procedures/injections/nerve blocks etc.      Otherwise, information below is still accurate and current.     12/28/2022 - Interval History:  She has continued suffering with near daily headaches which are present all day, everyday.  Having two different types of headaches, one of which is throbbing, one sided.  Feels headaches are manageable at this point and not interfering with her ability to continue day to day activities.  She is currently taking topiramate 50 mg bid for headache prevention, she does feel this has helped to decrease the intensity of her headaches.  Taking tizanidine 2-4 mg only on occasion.  Has tried flexeril in the past which she did not feel provided her with any relief.  She is enrolled in healthy back program, has been going to physical therapy routinely without any noticeable improvement.   Continues to feel her neck is playing a large role in her headaches, will do stretches recommended by PT which helps for a short while before she feels her neck/upper back muscles begin to start tightening back up.      Otherwise, information below is still accurate and current.      09/21/2022 - Interval History:  She again attempted to taper topiramate beginning the day after her last visit, noticed she began to experience more frequent headaches and migraines became more intense so went back to taking 50 mg nightly (has been back on 50 mg nightly dose for over 2 months now).    Currently suffering with a headache on average 3 days per week, the majority of which come on in the afternoon, before dinnertime, maybe around 2 pm.  If she treats with maxalt 10 mg mlt migraine will last a couple of hours, otherwise will last the remainder of the day, occasionally will carry into the following day.    Headaches began to occur more frequently around July.    Stops drinking caffeine around 10:30 am, drinks at least 2 cups of coffee before this.       Sleep is poor - has trouble falling asleep and staying  "asleep. On average sleeps 4 hours per night. Does not feel rested upon waking.  Once she gets into the bed feels her mind will start to race, has a hard time turning her mind off.      Otherwise, information below is still accurate and current.      06/20/2022 - Interval History:  Tapered topiramate to 25 mg nightly which she had been taking nightly from end of March until a few weeks ago.  Noticed she was starting to experience more frequent migraines and subsequently decided to increase topiramate back to 50 mg nightly.  Since then, migraines have been back under control.  She continues to treat acute migraines with rizatriptan.  She would like to eventually get off topiramate.       Otherwise, information below is still accurate and current.      03/30/2022 - Interval History:  Migraines have been much improved since starting topiramate.  Currently suffering with only one migraine per month each of which lasts about 1 day.  She is taking topiramate 50 mg nightly for migraine prevention.  Only rarely takes maxalt for acute migraines.  She is very pleased with the improvement in her migraines.  Would like to attempt to taper off topiramate.       Otherwise, information below is still accurate and current.      History of Present Illness:   51 y.o. female with chronic allergic rhinitis, headaches and rheumatoid arthritis, who presents to clinic alone for evaluation of headaches.  Long history of sinus issues, with chronic daily headaches/facial pain.  Reports once per week she experiences unbearable pain which persists all day in duration.  Headaches are described as a moderate to severe, pressure and pulsating pain "like I'm wearing pain goggles" located bilateral cheeks, forehead, and temporalis bilaterally.  Headaches can last anywhere from an hour up to all day in duration.   Pain can be rated anywhere from 3 to 8 out of 10, intensifying to peak over more than an hour.  Made worse by physical movement.  " "Associated symptoms include congestion, photophobia, nausea (only "once in a blue moon").  She does have a history of migraines in her past, which were hormonally driven, migraines resolved after she completed hormone therapy.  She has not been able to identify any trigger.  On days when headaches are worse, she is able to continue working however finds it more difficult to concentrate.       Treatments Tried and Response  Sumatriptan   Amitriptyline -   Gabapentin 200 mg qhs - for RA  celebrex - for RA  topiramate - helping   maxalt 10 mg mlt - helps   Baclofen -   Emgality -     Current Medications:    baclofen (LIORESAL) 10 MG tablet, Take 1 tablet (10 mg total) by mouth every 8 (eight) hours as needed (muscle spasm)., Disp: 90 tablet, Rfl: 2    cholecalciferol, vitamin D3, 125 mcg (5,000 unit) Tab, Take by mouth., Disp: , Rfl:     cyanocobalamin 1,000 mcg/mL injection, every 14 (fourteen) days. , Disp: , Rfl: 0    famotidine (PEPCID) 40 MG tablet, , Disp: , Rfl:     gabapentin (NEURONTIN) 300 MG capsule, Take 2 capsules (600 mg total) by mouth every evening., Disp: 60 capsule, Rfl: 11    galcanezumab-gnlm 120 mg/mL PnIj, Inject 120 mg into the skin every 28 days. Begin 28 days after loading dose., Disp: 1 mL, Rfl: 10    rizatriptan (MAXALT-MLT) 10 MG disintegrating tablet, Take 1 tablet (10 mg total) by mouth every 2 (two) hours as needed for Migraine. Max 30 mg/day., Disp: 12 tablet, Rfl: 5    topiramate (TOPAMAX) 50 MG tablet, Take 1 tablet (50 mg total) by mouth 2 (two) times daily., Disp: 180 tablet, Rfl: 1    Review of Systems - A review of 10+ systems was conducted with pertinent positive and negative findings documented in HPI with all other systems reviewed and negative.    PFSH: Past medical, family, and social history reviewed as documented in chart with pertinent positive medical, family, and social history detailed in HPI.    OBJECTIVE:  Vitals: There were no vitals taken for this visit.     Physical " Exam:  Constitutional: she appears well-developed and well-nourished. she is well groomed. NAD.     Review of Data:   Notes from physical therapy, pain management reviewed   Labs:  Lab Visit on 09/30/2022   Component Date Value Ref Range Status    Transferrin 09/30/2022 300  200 - 375 mg/dL Final    Iron 09/30/2022 87  30 - 160 ug/dL Final    Transferrin 09/30/2022 300  200 - 375 mg/dL Final    TIBC 09/30/2022 444  250 - 450 ug/dL Final    Saturated Iron 09/30/2022 20  20 - 50 % Final    Hepatitis C Ab 09/30/2022 Non-reactive  Non-reactive Final    Hepatitis B Surface Ag 09/30/2022 Non-reactive  Non-reactive Final    Hep B Core Total Ab 09/30/2022 Non-reactive  Non-reactive Final    Hep. B Surf Ab, Qual 09/30/2022 Negative   Final    Hep. B Surf Ab, Quant. 09/30/2022 <3  mIU/mL Final   Lab Visit on 09/21/2022   Component Date Value Ref Range Status    TSH 09/21/2022 0.695  0.400 - 4.000 uIU/mL Final    Sodium 09/21/2022 139  136 - 145 mmol/L Final    Potassium 09/21/2022 4.3  3.5 - 5.1 mmol/L Final    Chloride 09/21/2022 106  95 - 110 mmol/L Final    CO2 09/21/2022 26  23 - 29 mmol/L Final    Glucose 09/21/2022 93  70 - 110 mg/dL Final    BUN 09/21/2022 17  6 - 20 mg/dL Final    Creatinine 09/21/2022 0.9  0.5 - 1.4 mg/dL Final    Calcium 09/21/2022 9.7  8.7 - 10.5 mg/dL Final    Total Protein 09/21/2022 6.9  6.0 - 8.4 g/dL Final    Albumin 09/21/2022 4.4  3.5 - 5.2 g/dL Final    Total Bilirubin 09/21/2022 1.3 (H)  0.1 - 1.0 mg/dL Final    Alkaline Phosphatase 09/21/2022 93  55 - 135 U/L Final    AST 09/21/2022 54 (H)  10 - 40 U/L Final    ALT 09/21/2022 75 (H)  10 - 44 U/L Final    Anion Gap 09/21/2022 7 (L)  8 - 16 mmol/L Final    eGFR 09/21/2022 >60.0  >60 mL/min/1.73 m^2 Final     Imaging:  No results found for this or any previous visit.  Note: I have independently reviewed any/all imaging/labs/tests and agree with the report (s) as documented.  Any discrepancies will be as noted/demarcated by free text.  ECV,  FNP-C 3/10/2023    ASSESSMENT:  1. Migraine without aura, intractable, without status migrainosus    2. Cervical radiculopathy    3. Low back pain, unspecified back pain laterality, unspecified chronicity, unspecified whether sciatica present    4. Migraine without aura and without status migrainosus, not intractable      PLAN:  - For migraine prevention - continue emgality 120 mg SQ monthly, topiramate 50 mg bid   - for acute migraines - maxalt 10 mg mlt   -  Keep appt with pain management later today - considering steroid injections vs nerve blocks   - Continue tracking headaches   - RTC in 3-6 months or sooner if needed    Orders Placed This Encounter    topiramate (TOPAMAX) 50 MG tablet     Questions and concerns were sought and answered to the patient's stated verbal satisfaction.  The patient verbalizes understanding and agreement with the above stated treatment plan.     CC: ESPERANZA Albright, FNP-C  [facility]   [phone]    Dr. Rashid was available during today's encounter.

## 2023-03-10 NOTE — PROGRESS NOTES
"Chronic patient Established Note (Follow up visit)      SUBJECTIVE:    Interval History 3/10/2023:  Chaya Dee presents to the clinic for a follow-up appointment for neck and low back pain. She continues to report low back and tailbone pain, right side greater than left. She denies any radiating leg pain at this time. Her pain is worse with prolonged sitting and standing. She also endorses pain with prolonged walking. She also reports neck pain and headaches. She has recently completed [facility] with some benefit. She is taking Gabapentin and Baclofen. She denies any other health changes. Her pain today is 3/10.    HPI:  Patient has been having left-sided neck pain for approximately 7 years. Radiates to the occiput bilaterally, as well as "down the spine to between the shoulder blades." Is worse during the day with computer work, and prolonged standing. Previously radiated throughout the entire left arm, with numbness and tingling, but this ceased 1 year ago. Additionally reports migraines daily: described as dull bilateral frontal headache that radiates to the base of the skull. Is currently topiramate and Emgality, as well as rizatriptan as abortive. Topiramate has not been very helpful, and just started CGRP. No associated weakness in the upper extremities. Back pain is described as being in the low back bilaterally, along with pain in right-sided buttocks. Pain does not radiate to either leg, no paresthesias. Made worse with prolonged standing, walking. No associated weakness. No fevers, chills, night sweats, history of cancer. Has been taking gabapentin and baclofen, which helps her sleep at night but not otherwise.    Pain Disability Index Review:  Last 3 PDI Scores 1/25/2023   Pain Disability Index (PDI) 36       Pain Medications:  Gabapentin  Baclofen    Opioid Contract: not applicable     report:  Not applicable    Pain Procedures:   None    Physical Therapy/Home Exercise: " yes    Imaging:  MRI Lumbar Spine 12/27/2022:  COMPARISON:  Radiographs 12/16/2022     FINDINGS:  Alignment: Normal.     Vertebrae: Normal marrow signal. No fracture.     Discs: Disc desiccation annular fissure at L5-S1.  No evidence for discitis.     Cord: Conus terminates at L1 and appears unremarkable.  Cauda equina appears unremarkable.     Degenerative findings:     T12-L1: No spinal canal stenosis or neural foraminal narrowing.     L1-L2: No spinal canal stenosis or neural foraminal narrowing.     L2-L3: No spinal canal stenosis or neural foraminal narrowing.     L3-L4: Mild facet arthropathy.  No spinal canal stenosis or neural foraminal narrowing.     L4-L5: Circumferential disc bulge and mild facet arthropathy result in mild bilateral neural foraminal narrowing.     L5-S1: Circumferential disc bulge with annular fissure and mild facet arthropathy.  No spinal canal stenosis or neural foraminal narrowing.     Paraspinal muscles & soft tissues: Unremarkable.     Impression:     1. Overall mild degenerative changes of the lower lumbar spine.    MRI Cervical Spine 12/27/2022:  COMPARISON:  Radiographs 12/16/2022.     FINDINGS:  C1-C2: Dens is intact.  Pre dens space is maintained.     Alignment: Grade 1 anterolisthesis of C2-C3. Straightening of lordosis.     Vertebrae: No fracture or marrow infiltrative process.  Reactive bone marrow edema seen about the left C2-C3 facet joint.     Discs: Date disc heights are maintained.  No evidence for discitis.     Cord: Normal.     Skull base and craniocervical junction: Normal.     Degenerative findings:     C2-C3: Uncovertebral spurring and severe facet arthropathy result in moderate left neural foraminal narrowing.     C3-C4: Posterior disc osteophyte complex noted.  No spinal canal stenosis or neural foraminal narrowing.     C4-C5: Uncovertebral spurring and moderate facet arthropathy result in mild left neural foraminal narrowing.     C5-C6: Posterior disc osteophyte  complex with uncovertebral spurring and moderate facet arthropathy result in moderate spinal canal stenosis and moderate bilateral neural foraminal narrowing.     C6-C7: Posterior disc osteophyte complex and moderate facet arthropathy noted.  No spinal canal stenosis or neural foraminal narrowing.  Note made of bilateral perineural sleeve cysts.     C7-T1: No spinal canal stenosis or neural foraminal narrowing.  Note made of bilateral perineural sleeve cysts.     Paraspinal muscles & soft tissues: Unremarkable.     Impression:     1. Multilevel degenerative changes of the cervical spine detailed above.  Note made of moderate spinal canal stenosis and neural foraminal narrowing at C5-C6.  Severe left facet arthropathy with bone marrow edema noted at C2-C3.            Allergies: Review of patient's allergies indicates:  No Known Allergies    Current Medications:   Current Outpatient Medications   Medication Sig Dispense Refill    baclofen (LIORESAL) 10 MG tablet Take 1 tablet (10 mg total) by mouth every 8 (eight) hours as needed (muscle spasm). 90 tablet 2    cholecalciferol, vitamin D3, 125 mcg (5,000 unit) Tab Take by mouth.      cyanocobalamin 1,000 mcg/mL injection every 14 (fourteen) days.   0    famotidine (PEPCID) 40 MG tablet       gabapentin (NEURONTIN) 300 MG capsule Take 2 capsules (600 mg total) by mouth every evening. 60 capsule 11    galcanezumab-gnlm 120 mg/mL PnIj Inject 120 mg into the skin every 28 days. Begin 28 days after loading dose. 1 mL 10    rizatriptan (MAXALT-MLT) 10 MG disintegrating tablet Take 1 tablet (10 mg total) by mouth every 2 (two) hours as needed for Migraine. Max 30 mg/day. 12 tablet 5    topiramate (TOPAMAX) 50 MG tablet Take 1 tablet (50 mg total) by mouth 2 (two) times daily. 180 tablet 1     No current facility-administered medications for this visit.       REVIEW OF SYSTEMS:    GENERAL:  No weight loss, malaise or fevers.  HEENT:  Negative for frequent or significant  headaches.  NECK:  Negative for lumps, goiter, pain and significant neck swelling.  RESPIRATORY:  Negative for cough, wheezing or shortness of breath.  CARDIOVASCULAR:  Negative for chest pain, leg swelling or palpitations.  GI:  Negative for abdominal discomfort, blood in stools or black stools or change in bowel habits.  MUSCULOSKELETAL:  See HPI.  SKIN:  Negative for lesions, rash, and itching.  PSYCH:  Negative for sleep disturbance, mood disorder and recent psychosocial stressors.  HEMATOLOGY/LYMPHOLOGY:  Negative for prolonged bleeding, bruising easily or swollen nodes.  NEURO:   No history of syncope, paralysis, seizures or tremors. Migraine headaches.   All other reviewed and negative other than HPI.    Past Medical History:  Past Medical History:   Diagnosis Date    Allergy     Chronic allergic rhinitis 3/19/2016    Endometriosis     Laryngopharyngeal reflux disease 2018    PONV (postoperative nausea and vomiting)     Recurrent upper respiratory infection (URI)        Past Surgical History:  Past Surgical History:   Procedure Laterality Date    ABDOMINAL SURGERY      endometrosis    ADENOIDECTOMY      NASAL SEPTUM SURGERY      SHOULDER SURGERY      SINUS SURGERY      TONSILLECTOMY         Family History:  Family History   Problem Relation Age of Onset    Thyroid disease Sister     Allergic rhinitis Mother     Thyroid disease Maternal Aunt     Thyroid disease Maternal Uncle     Diabetes Maternal Grandmother         late in life    Heart disease Maternal Grandmother         Congestive heart failure    Stroke Paternal Grandfather          of stroke    Cancer Paternal Aunt         uterine       Social History:  Social History     Socioeconomic History    Marital status:    Tobacco Use    Smoking status: Never    Smokeless tobacco: Never   Substance and Sexual Activity    Alcohol use: Yes     Alcohol/week: 2.0 standard drinks     Types: 2 Glasses of wine per week     Comment: socially    Drug use:  "No    Sexual activity: Not Currently     Partners: Male     Birth control/protection: Other-see comments, None     Comment: No longer taking.  In menopause.       OBJECTIVE:    /78   Pulse 82   Ht 5' 4" (1.626 m)   Wt 64 kg (141 lb)   BMI 24.20 kg/m²     PHYSICAL EXAMINATION:    General appearance: Well appearing, in no acute distress, alert and oriented x3.  Psych:  Mood and affect appropriate.  Skin: Skin color, texture, turgor normal, no rashes or lesions, in both upper and lower body.  Head/face:  Atraumatic, normocephalic.  Cor: RRR  Pulm: Symmetric chest rise, no respiratory distress noted.   Back: Straight leg raising in the sitting position is negative to radicular pain. There is pain with palpation over right lower facet joints. Full ROM with pain on extension. Positive facet loading on the right.    Extremities: No deformities, edema, or skin discoloration. Good capillary refill.  Musculoskeletal: There is mild pain with palpation over right SI joint. FABERs is negative bilaterally. There is pain with palpation over coccyx. Bilateral lower extremity strength is normal and symmetric.  No atrophy or tone abnormalities are noted.  Neuro: No loss of sensation is noted.  Gait: Normal.    ASSESSMENT: 53 y.o. year old female with low back pain, consistent with the followin. Spondylosis of lumbar region without myelopathy or radiculopathy  Procedure Order to Pain Management      2. DDD (degenerative disc disease), lumbar        3. Sacroiliac dysfunction        4. Coccydynia              PLAN:     - Previous imaging was reviewed and discussed with the patient today.    - Schedule for right L5/S1 facet joint injection.     - We can also consider lumbar medial branch blocks versus SI joint injections in the future.     - I have stressed the importance of physical activity and a home exercise plan to help with pain and improve health. She recently completed physical therapy.     - Patient can " continue with medications for now since they are providing benefits, using them appropriately, and without side effects.     - RTC 2 weeks after above procedure.     The above plan and management options were discussed at length with patient. Patient is in agreement with the above and verbalized understanding.    Vero Giles NP  03/10/2023     I spent a total of 30 minutes on the day of the visit.  This includes face to face time and non-face to face time preparing to see the patient by reviewing previous labs/imaging, obtaining and/or reviewing separately obtained history, documenting clinical information in the electronic or other health record, independently interpreting results and communicating results to the patient/family/caregiver.

## 2023-03-10 NOTE — TELEPHONE ENCOUNTER
"----- Message from Shelli Ornelas sent at 3/10/2023  3:05 PM CST -----  Regarding: Same Day Appointment                Name of Who is Calling:   JASON OLMSTEAD    Who Left The Message:   JASON OLMSTEAD      What is the request in detail:      Patient called stating,  "she's running a little late but intends to keep her appointment."     Thank you                                                 "

## 2023-03-17 ENCOUNTER — TELEPHONE (OUTPATIENT)
Dept: PAIN MEDICINE | Facility: CLINIC | Age: 53
End: 2023-03-17
Payer: COMMERCIAL

## 2023-03-17 NOTE — TELEPHONE ENCOUNTER
Staff spoke to patient who said if pain worsens she will head to urgent care to be seen. Patient will also try OTC pain patches to see if it helps with the pain.

## 2023-03-17 NOTE — TELEPHONE ENCOUNTER
----- Message from Ruby Fontanez sent at 3/17/2023  8:49 AM CDT -----  Regarding: Patient call back  Who called:JASON OLMSTEAD [[id number]]    What is the request in detail: Patient is requesting a call back. She states she is having difficulty walking due to the pian in her tail bone since Wednesday morning that has not gone away. She states she has been doing yoga stretches and sitting on a heating pad. She has taken ibuprofen and her baclofen and they are not helping.  She would like a script called in or a suggestion on what she can do.   Please advise.    Can the clinic reply by [facility]? No    Best call back number: [phone]    Additional Information: N/A

## 2023-03-17 NOTE — TELEPHONE ENCOUNTER
----- Message from Ruby Fontanez sent at 3/17/2023  8:49 AM CDT -----  Regarding: Patient call back  Who called:JASON OLMSTEAD [[id number]]    What is the request in detail: Patient is requesting a call back. She states she is having difficulty walking due to the pian in her tail bone since Wednesday morning that has not gone away. She states she has been doing yoga stretches and sitting on a heating pad. She has taken ibuprofen and her baclofen and they are not helping.  She would like a script called in or a suggestion on what she can do.   Please advise.    Can the clinic reply by [facility]? No    Best call back number: [phone]    Additional Information: N/A

## 2023-03-17 NOTE — TELEPHONE ENCOUNTER
Good morning. We are sorry to hear of the pain and discomfort you are experiencing at this time. If the pain worsens head to the ER to be seen. You will need to come in and be evaluated for any pain meds so you would need to schedule an appointment with your provider, which we would gladly do for you or you can schedule yourself vie my [facility] portal.

## 2023-05-03 ENCOUNTER — PATIENT MESSAGE (OUTPATIENT)
Dept: INTERNAL MEDICINE | Facility: CLINIC | Age: 53
End: 2023-05-03
Payer: COMMERCIAL

## 2023-05-03 RX ORDER — TRIAMCINOLONE ACETONIDE 1 MG/G
OINTMENT TOPICAL 2 TIMES DAILY
Qty: 30 G | Refills: 1 | Status: SHIPPED | OUTPATIENT
Start: 2023-05-03 | End: 2023-08-30

## 2023-05-04 ENCOUNTER — PATIENT MESSAGE (OUTPATIENT)
Dept: PAIN MEDICINE | Facility: OTHER | Age: 53
End: 2023-05-04
Payer: COMMERCIAL

## 2023-05-05 ENCOUNTER — PATIENT MESSAGE (OUTPATIENT)
Dept: ADMINISTRATIVE | Facility: OTHER | Age: 53
End: 2023-05-05
Payer: COMMERCIAL

## 2023-05-06 ENCOUNTER — PATIENT MESSAGE (OUTPATIENT)
Dept: ADMINISTRATIVE | Facility: OTHER | Age: 53
End: 2023-05-06
Payer: COMMERCIAL

## 2023-05-11 ENCOUNTER — HOSPITAL ENCOUNTER (OUTPATIENT)
Facility: OTHER | Age: 53
Discharge: HOME | End: 2023-05-11
Attending: ANESTHESIOLOGY | Admitting: ANESTHESIOLOGY
Payer: COMMERCIAL

## 2023-05-11 VITALS
BODY MASS INDEX: 25.44 KG/M2 | WEIGHT: 149 LBS | RESPIRATION RATE: 16 BRPM | OXYGEN SATURATION: 100 % | TEMPERATURE: 98 F | HEIGHT: 64 IN | DIASTOLIC BLOOD PRESSURE: 58 MMHG | HEART RATE: 70 BPM | SYSTOLIC BLOOD PRESSURE: 106 MMHG

## 2023-05-11 DIAGNOSIS — M47.819 SPONDYLOSIS WITHOUT MYELOPATHY: ICD-10-CM

## 2023-05-11 DIAGNOSIS — G89.29 CHRONIC PAIN: ICD-10-CM

## 2023-05-11 DIAGNOSIS — M47.9 OSTEOARTHRITIS OF SPINE, UNSPECIFIED SPINAL OSTEOARTHRITIS COMPLICATION STATUS, UNSPECIFIED SPINAL REGION: Primary | ICD-10-CM

## 2023-05-11 PROCEDURE — 25000003 PHARM REV CODE 250: Performed by: STUDENT IN AN ORGANIZED HEALTH CARE EDUCATION/TRAINING PROGRAM

## 2023-05-11 PROCEDURE — 64493 PR INJ DX/THER AGNT PARAVERT FACET JOINT,IMG GUIDE,LUMBAR/SAC,1ST LVL: ICD-10-PCS | Mod: RT,,, | Performed by: ANESTHESIOLOGY

## 2023-05-11 PROCEDURE — 25500020 PHARM REV CODE 255: Performed by: ANESTHESIOLOGY

## 2023-05-11 PROCEDURE — 25000003 PHARM REV CODE 250: Performed by: ANESTHESIOLOGY

## 2023-05-11 PROCEDURE — 64493 INJ PARAVERT F JNT L/S 1 LEV: CPT | Mod: RT | Performed by: ANESTHESIOLOGY

## 2023-05-11 PROCEDURE — 64493 INJ PARAVERT F JNT L/S 1 LEV: CPT | Mod: RT,,, | Performed by: ANESTHESIOLOGY

## 2023-05-11 PROCEDURE — 63600175 PHARM REV CODE 636 W HCPCS: Performed by: ANESTHESIOLOGY

## 2023-05-11 RX ORDER — LIDOCAINE HYDROCHLORIDE 20 MG/ML
INJECTION, SOLUTION INFILTRATION; PERINEURAL
Status: DISCONTINUED | OUTPATIENT
Start: 2023-05-11 | End: 2023-05-11 | Stop reason: HOSPADM

## 2023-05-11 RX ORDER — SODIUM CHLORIDE 9 MG/ML
500 INJECTION, SOLUTION INTRAVENOUS CONTINUOUS
Status: DISCONTINUED | OUTPATIENT
Start: 2023-05-11 | End: 2023-05-11 | Stop reason: HOSPADM

## 2023-05-11 RX ORDER — MIDAZOLAM HYDROCHLORIDE 1 MG/ML
INJECTION INTRAMUSCULAR; INTRAVENOUS
Status: DISCONTINUED | OUTPATIENT
Start: 2023-05-11 | End: 2023-05-11 | Stop reason: HOSPADM

## 2023-05-11 RX ORDER — FENTANYL CITRATE 50 UG/ML
INJECTION, SOLUTION INTRAMUSCULAR; INTRAVENOUS
Status: DISCONTINUED | OUTPATIENT
Start: 2023-05-11 | End: 2023-05-11 | Stop reason: HOSPADM

## 2023-05-11 RX ORDER — DEXAMETHASONE SODIUM PHOSPHATE 10 MG/ML
INJECTION INTRAMUSCULAR; INTRAVENOUS
Status: DISCONTINUED | OUTPATIENT
Start: 2023-05-11 | End: 2023-05-11 | Stop reason: HOSPADM

## 2023-05-11 RX ORDER — BUPIVACAINE HYDROCHLORIDE 2.5 MG/ML
INJECTION, SOLUTION EPIDURAL; INFILTRATION; INTRACAUDAL
Status: DISCONTINUED | OUTPATIENT
Start: 2023-05-11 | End: 2023-05-11 | Stop reason: HOSPADM

## 2023-05-11 NOTE — DISCHARGE SUMMARY
Discharge Note  Short Stay      SUMMARY     Admit Date: 5/11/2023    Attending Physician: Andra Reich      Discharge Physician: Andra Reich      Discharge Date: 5/11/2023 10:47 AM    Procedure(s) (LRB):  INJECTION, FACET JOINT RIGHT L5/S1 (Right)    Final Diagnosis: Spondylosis of lumbar region without myelopathy or radiculopathy [M47.816]    Disposition: Home or self care    Patient Instructions:   Current Discharge Medication List        CONTINUE these medications which have NOT CHANGED    Details   baclofen (LIORESAL) 10 MG tablet TAKE 1 TABLET BY MOUTH EVERY EIGHT HOURS AS NEEDED FOR MUSCLE SPASM  Qty: 270 tablet, Refills: 0    Associated Diagnoses: Migraine without aura, intractable, without status migrainosus      cholecalciferol, vitamin D3, 125 mcg (5,000 unit) Tab Take by mouth.      cyanocobalamin 1,000 mcg/mL injection every 14 (fourteen) days.   Refills: 0      famotidine (PEPCID) 40 MG tablet       gabapentin (NEURONTIN) 300 MG capsule Take 2 capsules (600 mg total) by mouth every evening.  Qty: 60 capsule, Refills: 11    Associated Diagnoses: Lumbar radiculopathy; Cervical radiculopathy      galcanezumab-gnlm 120 mg/mL PnIj Inject 120 mg into the skin every 28 days. Begin 28 days after loading dose.  Qty: 1 mL, Refills: 10    Associated Diagnoses: Chronic migraine without aura without status migrainosus, not intractable; Migraine without aura, intractable, without status migrainosus      rizatriptan (MAXALT-MLT) 10 MG disintegrating tablet Take 1 tablet (10 mg total) by mouth every 2 (two) hours as needed for Migraine. Max 30 mg/day.  Qty: 12 tablet, Refills: 5    Associated Diagnoses: Intractable migraine without aura and without status migrainosus      topiramate (TOPAMAX) 50 MG tablet Take 1 tablet (50 mg total) by mouth 2 (two) times daily.  Qty: 180 tablet, Refills: 1    Associated Diagnoses: Migraine without aura and without status migrainosus, not intractable      triamcinolone acetonide 0.1%  (KENALOG) 0.1 % ointment Apply topically 2 (two) times daily.  Qty: 30 g, Refills: 1                 Discharge Diagnosis: Spondylosis of lumbar region without myelopathy or radiculopathy [M47.816]  Condition on Discharge: Stable with no complications to procedure   Diet on Discharge: Same as before.  Activity: as per instruction sheet.  Discharge to: Home with a responsible adult.  Follow up: 2-4 weeks       Please call my office or pager at [phone] if experienced any weakness or loss of sensation, fever > 101.5, pain uncontrolled with oral medications, persistent nausea/vomiting/or diarrhea, redness or drainage from the incisions, or any other worrisome concerns. If physician on call was not reached or could not communicate with our office for any reason please go to the nearest emergency department

## 2023-05-11 NOTE — OP NOTE
Therapeutic Lumbar Facet Joint Injection under Fluoroscopy     The procedure, risks, benefits, and options were discussed with the patient. There are no contraindications to the procedure. The patent expressed understanding and agreed to the procedure. Informed written consent was obtained prior to the start of the procedure and can be found in the patient's chart.    PATIENT NAME: Chaya Dee   MRN: [id number]     DATE OF PROCEDURE: 05/11/2023    PROCEDURE: Right L5/S1 Lumbar Facet Joint Injection under Fluoroscopy      PRE-OP DIAGNOSIS: Spondylosis of lumbar region without myelopathy or radiculopathy [M47.816] Lumbar spondylosis [M47.816]    POST-OP DIAGNOSIS: Same    PHYSICIAN: Andra Reich MD    ASSISTANTS:  Gonzalo Lin MD  [facility] resident      MEDICATIONS INJECTED: Preservative-free Decadron 10mg with 1cc of Bupivacaine 0.25%    LOCAL ANESTHETIC INJECTED: Xylocaine 2%     SEDATION: None    ESTIMATED BLOOD LOSS: None    COMPLICATIONS: None    TECHNIQUE: Time-out was performed to identify the patient and procedure to be performed. With the patient laying in a prone position, the surgical area was prepped and draped in the usual sterile fashion using ChloraPrep and a fenestrated drape. The level was determined under fluoroscopy guidance. Skin anesthesia was achieved by injecting Lidocaine 2% over the injection sites. A 22 gauge, 3.5 inch needle was introduced to each level using AP, lateral and/or contralateral oblique fluoroscopic imaging. Contrast dye  Omnipaque (300mg/mL) was given until dye spread was in the distribution of the facet joint without any intravascular spread.  After negative aspiration for blood or CSF was confirmed, 2 mL of the medication mixture listed above was injected slowly into each joint with displacement of the contrast confirming that the medication went into the distribution of the facet joints. The needles were removed and bleeding was nil. A sterile dressing was applied. No  specimens collected. The patient tolerated the procedure well.    PAIN BEFORE THE PROCEDURE: 7/10    PAIN AFTER THE PROCEDURE: 0/10      The patient was monitored after the procedure in the recovery area. They were given post-procedure and discharge instructions to follow at home. The patient was discharged in a stable condition.        Andra Reich MD

## 2023-05-11 NOTE — DISCHARGE INSTRUCTIONS

## 2023-05-11 NOTE — H&P
HPI  Patient presenting for Procedure(s) (LRB):  INJECTION, FACET JOINT RIGHT L5/S1 (Right)     Patient on Anti-coagulation No    No health changes since previous encounter    Past Medical History:   Diagnosis Date    Allergy     Chronic allergic rhinitis 3/19/2016    Endometriosis     Laryngopharyngeal reflux disease 03/2018    PONV (postoperative nausea and vomiting)     Recurrent upper respiratory infection (URI)      Past Surgical History:   Procedure Laterality Date    ABDOMINAL SURGERY      endometrosis    ADENOIDECTOMY      NASAL SEPTUM SURGERY      SHOULDER SURGERY      SINUS SURGERY      TONSILLECTOMY       Review of patient's allergies indicates:  No Known Allergies   Current Facility-Administered Medications   Medication    0.9%  NaCl infusion       PMHx, PSHx, Allergies, Medications reviewed in epic    ROS negative except pain complaints in HPI    OBJECTIVE:    Breastfeeding No     PHYSICAL EXAMINATION:    GENERAL: Well appearing, in no acute distress, alert and oriented x3.  PSYCH:  Mood and affect appropriate.  SKIN: Skin color, texture, turgor normal, no rashes or lesions which will impact the procedure.  CV: RRR with palpation of the radial artery.  PULM: No evidence of respiratory difficulty, symmetric chest rise. Clear to auscultation.  NEURO: Cranial nerves grossly intact.    Plan:    Proceed with procedure as planned Procedure(s) (LRB):  INJECTION, FACET JOINT RIGHT L5/S1 (Right)    Zachariah N Weilenman  05/11/2023

## 2023-05-13 ENCOUNTER — PATIENT MESSAGE (OUTPATIENT)
Dept: ADMINISTRATIVE | Facility: OTHER | Age: 53
End: 2023-05-13
Payer: COMMERCIAL

## 2023-05-25 ENCOUNTER — OFFICE VISIT (OUTPATIENT)
Dept: PAIN MEDICINE | Facility: CLINIC | Age: 53
End: 2023-05-25
Payer: COMMERCIAL

## 2023-05-25 DIAGNOSIS — M53.3 SACROILIAC DYSFUNCTION: ICD-10-CM

## 2023-05-25 DIAGNOSIS — M51.36 DDD (DEGENERATIVE DISC DISEASE), LUMBAR: ICD-10-CM

## 2023-05-25 DIAGNOSIS — M47.816 SPONDYLOSIS OF LUMBAR REGION WITHOUT MYELOPATHY OR RADICULOPATHY: Primary | ICD-10-CM

## 2023-05-25 DIAGNOSIS — M47.812 SPONDYLOSIS OF CERVICAL REGION WITHOUT MYELOPATHY OR RADICULOPATHY: ICD-10-CM

## 2023-05-25 DIAGNOSIS — M53.3 COCCYDYNIA: ICD-10-CM

## 2023-05-25 PROCEDURE — 99213 PR OFFICE/OUTPT VISIT, EST, LEVL III, 20-29 MIN: ICD-10-PCS | Mod: 95,,, | Performed by: NURSE PRACTITIONER

## 2023-05-25 PROCEDURE — 99213 OFFICE O/P EST LOW 20 MIN: CPT | Mod: 95,,, | Performed by: NURSE PRACTITIONER

## 2023-05-25 NOTE — PROGRESS NOTES
Chronic patient Established Note (Follow up visit)  Chronic Pain-Tele-Medicine-Established Note (Follow up visit)        The patient location is: Home  The chief complaint leading to consultation is: pain  Visit type: Virtual visit with synchronous audio and video  Total time spent with patient: 25 min  Each patient to whom he or she provides medical services by telemedicine is:  (1) informed of the relationship between the physician and patient and the respective role of any other health care provider with respect to management of the patient; and (2) notified that he or she may decline to receive medical services by telemedicine and may withdraw from such care at any time.        SUBJECTIVE:    Interval History 5/25/2023:  The patient returns to clinic today for follow up of back pain via virtual visit. She is s/p right L5/S1 facet joint injections on 5/11/2023. She reports no relief of her pain. She continues to report right sided low back pain. She also reports tailbone pain. She denies any radicular leg pain. Her pain is worse with prolonged sitting, standing, and walking. She does report muscle spasms today. She continues to report neck pain. She also has headaches. She is taking Gabapentin and Baclofen. She does perform home exercises. She denies any other health changes.     Interval History 3/10/2023:  Chaya Dee presents to the clinic for a follow-up appointment for neck and low back pain. She continues to report low back and tailbone pain, right side greater than left. She denies any radiating leg pain at this time. Her pain is worse with prolonged sitting and standing. She also endorses pain with prolonged walking. She also reports neck pain and headaches. She has recently completed [facility] with some benefit. She is taking Gabapentin and Baclofen. She denies any other health changes. Her pain today is 3/10.    HPI:  Patient has been having left-sided neck pain for approximately 7 years.  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 Previously radiated throughout the entire left arm, with numbness and tingling, but this ceased 1 year ago. Additionally reports migraines daily: described as dull bilateral frontal headache that radiates to the base of the skull. Is currently topiramate and Emgality, as well as rizatriptan as abortive. Topiramate has not been very helpful, and just started CGRP. No associated weakness in the upper extremities. Back pain is described as being in the low back bilaterally, along with pain in right-sided buttocks. Pain does not radiate to either leg, no paresthesias. Made worse with prolonged standing, walking. No associated weakness. No fevers, chills, night sweats, history of cancer. Has been taking gabapentin and baclofen, which helps her sleep at night but not otherwise.    Pain Disability Index Review:  Last 3 PDI Scores 1/25/2023   Pain Disability Index (PDI) 36       Pain Medications:  Gabapentin  Baclofen    Opioid Contract: not applicable     report:  Not applicable    Pain Procedures:   5/11/2023- Right L5/S1 facet joint injection- no relief    Physical Therapy/Home Exercise: yes    Imaging:  MRI Lumbar Spine 12/27/2022:  COMPARISON:  Radiographs 12/16/2022     FINDINGS:  Alignment: Normal.     Vertebrae: Normal marrow signal. No fracture.     Discs: Disc desiccation annular fissure at L5-S1.  No evidence for discitis.     Cord: Conus terminates at L1 and appears unremarkable.  Cauda equina appears unremarkable.     Degenerative findings:     T12-L1: No spinal canal stenosis or neural foraminal narrowing.     L1-L2: No spinal canal stenosis or neural foraminal narrowing.     L2-L3: No spinal canal stenosis or neural foraminal narrowing.     L3-L4: Mild facet arthropathy.  No spinal canal stenosis or neural foraminal narrowing.     L4-L5: " Circumferential disc bulge and mild facet arthropathy result in mild bilateral neural foraminal narrowing.     L5-S1: Circumferential disc bulge with annular fissure and mild facet arthropathy.  No spinal canal stenosis or neural foraminal narrowing.     Paraspinal muscles & soft tissues: Unremarkable.     Impression:     1. Overall mild degenerative changes of the lower lumbar spine.    MRI Cervical Spine 12/27/2022:  COMPARISON:  Radiographs 12/16/2022.     FINDINGS:  C1-C2: Dens is intact.  Pre dens space is maintained.     Alignment: Grade 1 anterolisthesis of C2-C3. Straightening of lordosis.     Vertebrae: No fracture or marrow infiltrative process.  Reactive bone marrow edema seen about the left C2-C3 facet joint.     Discs: Date disc heights are maintained.  No evidence for discitis.     Cord: Normal.     Skull base and craniocervical junction: Normal.     Degenerative findings:     C2-C3: Uncovertebral spurring and severe facet arthropathy result in moderate left neural foraminal narrowing.     C3-C4: Posterior disc osteophyte complex noted.  No spinal canal stenosis or neural foraminal narrowing.     C4-C5: Uncovertebral spurring and moderate facet arthropathy result in mild left neural foraminal narrowing.     C5-C6: Posterior disc osteophyte complex with uncovertebral spurring and moderate facet arthropathy result in moderate spinal canal stenosis and moderate bilateral neural foraminal narrowing.     C6-C7: Posterior disc osteophyte complex and moderate facet arthropathy noted.  No spinal canal stenosis or neural foraminal narrowing.  Note made of bilateral perineural sleeve cysts.     C7-T1: No spinal canal stenosis or neural foraminal narrowing.  Note made of bilateral perineural sleeve cysts.     Paraspinal muscles & soft tissues: Unremarkable.     Impression:     1. Multilevel degenerative changes of the cervical spine detailed above.  Note made of moderate spinal canal stenosis and neural foraminal  narrowing at C5-C6.  Severe left facet arthropathy with bone marrow edema noted at C2-C3.            Allergies: Review of patient's allergies indicates:  No Known Allergies    Current Medications:   Current Outpatient Medications   Medication Sig Dispense Refill    baclofen (LIORESAL) 10 MG tablet TAKE 1 TABLET BY MOUTH EVERY EIGHT HOURS AS NEEDED FOR MUSCLE SPASM 270 tablet 0    cholecalciferol, vitamin D3, 125 mcg (5,000 unit) Tab Take by mouth.      cyanocobalamin 1,000 mcg/mL injection every 14 (fourteen) days.   0    famotidine (PEPCID) 40 MG tablet       gabapentin (NEURONTIN) 300 MG capsule Take 2 capsules (600 mg total) by mouth every evening. 60 capsule 11    galcanezumab-gnlm 120 mg/mL PnIj Inject 120 mg into the skin every 28 days. Begin 28 days after loading dose. 1 mL 10    rizatriptan (MAXALT-MLT) 10 MG disintegrating tablet Take 1 tablet (10 mg total) by mouth every 2 (two) hours as needed for Migraine. Max 30 mg/day. 12 tablet 5    topiramate (TOPAMAX) 50 MG tablet Take 1 tablet (50 mg total) by mouth 2 (two) times daily. 180 tablet 1    triamcinolone acetonide 0.1% (KENALOG) 0.1 % ointment Apply topically 2 (two) times daily. 30 g 1     No current facility-administered medications for this visit.       REVIEW OF SYSTEMS:    GENERAL:  No weight loss, malaise or fevers.  HEENT:  Negative for frequent or significant headaches.  NECK:  Negative for lumps, goiter, pain and significant neck swelling.  RESPIRATORY:  Negative for cough, wheezing or shortness of breath.  CARDIOVASCULAR:  Negative for chest pain, leg swelling or palpitations.  GI:  Negative for abdominal discomfort, blood in stools or black stools or change in bowel habits.  MUSCULOSKELETAL:  See HPI.  SKIN:  Negative for lesions, rash, and itching.  PSYCH:  Negative for sleep disturbance, mood disorder and recent psychosocial stressors.  HEMATOLOGY/LYMPHOLOGY:  Negative for prolonged bleeding, bruising easily or swollen nodes.  NEURO:   No  history of syncope, paralysis, seizures or tremors. Migraine headaches.   All other reviewed and negative other than HPI.    Past Medical History:  Past Medical History:   Diagnosis Date    Allergy     Chronic allergic rhinitis 3/19/2016    Endometriosis     Laryngopharyngeal reflux disease 2018    PONV (postoperative nausea and vomiting)     Recurrent upper respiratory infection (URI)        Past Surgical History:  Past Surgical History:   Procedure Laterality Date    ABDOMINAL SURGERY      endometrosis    ADENOIDECTOMY      INJECTION OF FACET JOINT Right 2023    Procedure: INJECTION, FACET JOINT RIGHT L5/S1;  Surgeon: Andra Reich MD;  Location: [facility];  Service: Pain Management;  Laterality: Right;    NASAL SEPTUM SURGERY      SHOULDER SURGERY      SINUS SURGERY      TONSILLECTOMY         Family History:  Family History   Problem Relation Age of Onset    Thyroid disease Sister     Allergic rhinitis Mother     Thyroid disease Maternal Aunt     Thyroid disease Maternal Uncle     Diabetes Maternal Grandmother         late in life    Heart disease Maternal Grandmother         Congestive heart failure    Stroke Paternal Grandfather          of stroke    Cancer Paternal Aunt         uterine       Social History:  Social History     Socioeconomic History    Marital status:    Tobacco Use    Smoking status: Never    Smokeless tobacco: Never   Substance and Sexual Activity    Alcohol use: Yes     Alcohol/week: 2.0 standard drinks     Types: 2 Glasses of wine per week     Comment: socially    Drug use: No    Sexual activity: Not Currently     Partners: Male     Birth control/protection: Other-see comments, None     Comment: No longer taking.  In menopause.       OBJECTIVE:    Exam limited due to virtual visit:  General appearance: Well appearing, in no acute distress, alert and oriented x3.  Psych:  Mood and affect appropriate.    Previous physical exam:  There were no vitals taken for this  visit.    PHYSICAL EXAMINATION:    General appearance: Well appearing, in no acute distress, alert and oriented x3.  Psych:  Mood and affect appropriate.  Skin: Skin color, texture, turgor normal, no rashes or lesions, in both upper and lower body.  Head/face:  Atraumatic, normocephalic.  Cor: RRR  Pulm: Symmetric chest rise, no respiratory distress noted.   Back: Straight leg raising in the sitting position is negative to radicular pain. There is pain with palpation over right lower facet joints. Full ROM with pain on extension. Positive facet loading on the right.    Extremities: No deformities, edema, or skin discoloration. Good capillary refill.  Musculoskeletal: There is mild pain with palpation over right SI joint. FABERs is negative bilaterally. There is pain with palpation over coccyx. Bilateral lower extremity strength is normal and symmetric.  No atrophy or tone abnormalities are noted.  Neuro: No loss of sensation is noted.  Gait: Normal.    ASSESSMENT: 53 y.o. year old female with low back pain, consistent with the followin. Spondylosis of lumbar region without myelopathy or radiculopathy        2. DDD (degenerative disc disease), lumbar        3. Sacroiliac dysfunction        4. Coccydynia        5. Spondylosis of cervical region without myelopathy or radiculopathy                PLAN:     - Previous imaging was reviewed and discussed with the patient today.    - She is s/p right L5/S1 facet joint injection without benefit.     - Consider bilateral L3,4,5 MBB. She would like to think about this.     - She may also benefit from coccyx injection.     - Regarding her neck pain, consider cervical PARAG versus MBB.      - I have stressed the importance of physical activity and a home exercise plan to help with pain and improve health. She recently completed physical therapy.     - Patient can continue with medications for now since they are providing benefits, using them appropriately, and without side  effects.     - RTC PRN.     The above plan and management options were discussed at length with patient. Patient is in agreement with the above and verbalized understanding.    Vero Giles NP  05/25/2023

## 2023-05-31 LAB
HUMAN PAPILLOMAVIRUS (HPV): NORMAL
PAP RECOMMENDATION EXT: NORMAL
PAP SMEAR: NORMAL

## 2023-06-20 DIAGNOSIS — G43.019 MIGRAINE WITHOUT AURA, INTRACTABLE, WITHOUT STATUS MIGRAINOSUS: ICD-10-CM

## 2023-06-21 RX ORDER — BACLOFEN 10 MG/1
TABLET ORAL
Qty: 270 TABLET | Refills: 0 | Status: SHIPPED | OUTPATIENT
Start: 2023-06-21 | End: 2023-09-26

## 2023-06-23 ENCOUNTER — OFFICE VISIT (OUTPATIENT)
Dept: NEUROLOGY | Facility: CLINIC | Age: 53
End: 2023-06-23
Payer: COMMERCIAL

## 2023-06-23 DIAGNOSIS — G43.009 MIGRAINE WITHOUT AURA AND WITHOUT STATUS MIGRAINOSUS, NOT INTRACTABLE: Primary | ICD-10-CM

## 2023-06-23 PROCEDURE — 99214 PR OFFICE/OUTPT VISIT, EST, LEVL IV, 30-39 MIN: ICD-10-PCS | Mod: 95,,, | Performed by: NURSE PRACTITIONER

## 2023-06-23 PROCEDURE — 99214 OFFICE O/P EST MOD 30 MIN: CPT | Mod: 95,,, | Performed by: NURSE PRACTITIONER

## 2023-06-23 NOTE — PROGRESS NOTES
"Established Patient   SUBJECTIVE:  Patient ID: Chaya Dee   Chief Complaint: Follow-up    History of Present Illness:  Chaya Dee is a 53 y.o. female who presents for follow-up of headaches via virtual visit.     The patient location is: in [place]   The chief complaint leading to consultation is: Follow-up  Visit type: Virtual visit with synchronous audio and video  Total time spent with patient: 5 min  Each patient to whom he or she provides medical services by telemedicine is:  (1) informed of the relationship between the physician and patient and the respective role of any other health care provider with respect to management of the patient; and (2) notified that he or she may decline to receive medical services by telemedicine and may withdraw from such care at any time.    Recommendations made at last Office Visit on 3/10/23:  - For migraine prevention - continue emgality 120 mg SQ monthly, topiramate 50 mg bid   - for acute migraines - maxalt 10 mg mlt   -  Keep appt with pain management later today - considering steroid injections vs nerve blocks   - Continue tracking headaches   - RTC in 3-6 months or sooner if needed    06/23/2023 - Interval History:  Only two headaches since her last visit 3 months ago!  Emgality has been working "great", migraines only lasted a couple hours in duration.  She remains very pleased with the current state of her migraines, does not wish to make adjustments to her treatment plan.    Otherwise, information below is still accurate and current.     03/10/2023 - Interval History:  Has completed loading dose emgality and one maintenance dose of emgality, due for second maintenance dose in two days.  Since starting Emgality, migraines have greatly improved, went from having a daily headache down to only 5 headache days per month.  Headaches are lasting only an hour in duration.  Feels headaches are "totally manageable".    Completed healthy back and neck " physical therapy two days ago, has f/up appt later today with pain medicine to decide how to proceed as far as procedures/injections/nerve blocks etc.       Otherwise, information below is still accurate and current.      12/28/2022 - Interval History:  She has continued suffering with near daily headaches which are present all day, everyday.  Having two different types of headaches, one of which is throbbing, one sided.  Feels headaches are manageable at this point and not interfering with her ability to continue day to day activities.  She is currently taking topiramate 50 mg bid for headache prevention, she does feel this has helped to decrease the intensity of her headaches.  Taking tizanidine 2-4 mg only on occasion.  Has tried flexeril in the past which she did not feel provided her with any relief.  She is enrolled in healthy back program, has been going to physical therapy routinely without any noticeable improvement.   Continues to feel her neck is playing a large role in her headaches, will do stretches recommended by PT which helps for a short while before she feels her neck/upper back muscles begin to start tightening back up.      Otherwise, information below is still accurate and current.      09/21/2022 - Interval History:  She again attempted to taper topiramate beginning the day after her last visit, noticed she began to experience more frequent headaches and migraines became more intense so went back to taking 50 mg nightly (has been back on 50 mg nightly dose for over 2 months now).    Currently suffering with a headache on average 3 days per week, the majority of which come on in the afternoon, before dinnertime, maybe around 2 pm.  If she treats with maxalt 10 mg mlt migraine will last a couple of hours, otherwise will last the remainder of the day, occasionally will carry into the following day.    Headaches began to occur more frequently around July.    Stops drinking caffeine around 10:30 am,  "drinks at least 2 cups of coffee before this.       Sleep is poor - has trouble falling asleep and staying asleep. On average sleeps 4 hours per night. Does not feel rested upon waking.  Once she gets into the bed feels her mind will start to race, has a hard time turning her mind off.      Otherwise, information below is still accurate and current.      06/20/2022 - Interval History:  Tapered topiramate to 25 mg nightly which she had been taking nightly from end of March until a few weeks ago.  Noticed she was starting to experience more frequent migraines and subsequently decided to increase topiramate back to 50 mg nightly.  Since then, migraines have been back under control.  She continues to treat acute migraines with rizatriptan.  She would like to eventually get off topiramate.       Otherwise, information below is still accurate and current.      03/30/2022 - Interval History:  Migraines have been much improved since starting topiramate.  Currently suffering with only one migraine per month each of which lasts about 1 day.  She is taking topiramate 50 mg nightly for migraine prevention.  Only rarely takes maxalt for acute migraines.  She is very pleased with the improvement in her migraines.  Would like to attempt to taper off topiramate.       Otherwise, information below is still accurate and current.      History of Present Illness:   51 y.o. female with chronic allergic rhinitis, headaches and rheumatoid arthritis, who presents to clinic alone for evaluation of headaches.  Long history of sinus issues, with chronic daily headaches/facial pain.  Reports once per week she experiences unbearable pain which persists all day in duration.  Headaches are described as a moderate to severe, pressure and pulsating pain "like I'm wearing pain goggles" located bilateral cheeks, forehead, and temporalis bilaterally.  Headaches can last anywhere from an hour up to all day in duration.   Pain can be rated anywhere from " "3 to 8 out of 10, intensifying to peak over more than an hour.  Made worse by physical movement.  Associated symptoms include congestion, photophobia, nausea (only "once in a blue moon").  She does have a history of migraines in her past, which were hormonally driven, migraines resolved after she completed hormone therapy.  She has not been able to identify any trigger.  On days when headaches are worse, she is able to continue working however finds it more difficult to concentrate.       Treatments Tried and Response  Sumatriptan   Amitriptyline -   Gabapentin 200 mg qhs - for RA  celebrex - for RA  topiramate - helping   maxalt 10 mg mlt - helps   Baclofen -   Emgality -     Current Medications:    Current Outpatient Medications:     baclofen (LIORESAL) 10 MG tablet, TAKE 1 TABLET BY MOUTH EVERY 8 HOURS AS NEEDED FOR MUSCLE SPASM, Disp: 270 tablet, Rfl: 0    cholecalciferol, vitamin D3, 125 mcg (5,000 unit) Tab, Take by mouth., Disp: , Rfl:     cyanocobalamin 1,000 mcg/mL injection, every 14 (fourteen) days. , Disp: , Rfl: 0    famotidine (PEPCID) 40 MG tablet, , Disp: , Rfl:     gabapentin (NEURONTIN) 300 MG capsule, Take 2 capsules (600 mg total) by mouth every evening., Disp: 60 capsule, Rfl: 11    galcanezumab-gnlm 120 mg/mL PnIj, Inject 120 mg into the skin every 28 days. Begin 28 days after loading dose., Disp: 1 mL, Rfl: 10    rizatriptan (MAXALT-MLT) 10 MG disintegrating tablet, Take 1 tablet (10 mg total) by mouth every 2 (two) hours as needed for Migraine. Max 30 mg/day., Disp: 12 tablet, Rfl: 5    topiramate (TOPAMAX) 50 MG tablet, Take 1 tablet (50 mg total) by mouth 2 (two) times daily., Disp: 180 tablet, Rfl: 1    triamcinolone acetonide 0.1% (KENALOG) 0.1 % ointment, Apply topically 2 (two) times daily., Disp: 30 g, Rfl: 1    Review of Systems - A review of 10+ systems was conducted with pertinent positive and negative findings documented in HPI with all other systems reviewed and negative.    PFSH: " Past medical, family, and social history reviewed as documented in chart with pertinent positive medical, family, and social history detailed in HPI.    OBJECTIVE:  Vitals: There were no vitals taken for this visit.     Physical Exam:  Constitutional: she appears well-developed and well-nourished. she is well groomed. NAD.     Review of Data:   Notes from pain medicine, internal medicine reviewed   Labs:  No visits with results within 6 Month(s) from this visit.   Latest known visit with results is:   Lab Visit on 09/30/2022   Component Date Value Ref Range Status    Transferrin 09/30/2022 300  200 - 375 mg/dL Final    Iron 09/30/2022 87  30 - 160 ug/dL Final    Transferrin 09/30/2022 300  200 - 375 mg/dL Final    TIBC 09/30/2022 444  250 - 450 ug/dL Final    Saturated Iron 09/30/2022 20  20 - 50 % Final    Hepatitis C Ab 09/30/2022 Non-reactive  Non-reactive Final    Hepatitis B Surface Ag 09/30/2022 Non-reactive  Non-reactive Final    Hep B Core Total Ab 09/30/2022 Non-reactive  Non-reactive Final    Hep. B Surf Ab, Qual 09/30/2022 Negative   Final    Hep. B Surf Ab, Quant. 09/30/2022 <3  mIU/mL Final      Latest Reference Range & Units 09/21/22 11:27 09/30/22 12:20   Iron 30 - 160 ug/dL  87   TIBC 250 - 450 ug/dL  444   Saturated Iron 20 - 50 %  20   Transferrin 200 - 375 mg/dL  200 - 375 mg/dL  300  300   Sodium 136 - 145 mmol/L 139    Potassium 3.5 - 5.1 mmol/L 4.3    Chloride 95 - 110 mmol/L 106    CO2 23 - 29 mmol/L 26    Anion Gap 8 - 16 mmol/L 7 (L)    BUN 6 - 20 mg/dL 17    Creatinine 0.5 - 1.4 mg/dL 0.9    eGFR >60 mL/min/1.73 m^2 >60.0    Glucose 70 - 110 mg/dL 93    Calcium 8.7 - 10.5 mg/dL 9.7    Alkaline Phosphatase 55 - 135 U/L 93    PROTEIN TOTAL 6.0 - 8.4 g/dL 6.9    Albumin 3.5 - 5.2 g/dL 4.4    BILIRUBIN TOTAL 0.1 - 1.0 mg/dL 1.3 (H)    AST 10 - 40 U/L 54 (H)    ALT 10 - 44 U/L 75 (H)    (L): Data is abnormally low  (H): Data is abnormally high  Imaging:  No results found for this or any previous  visit.  Note: I have independently reviewed any/all imaging/labs/tests and agree with the report (s) as documented.  Any discrepancies will be as noted/demarcated by free text.  MARTINEZ SMITH 6/23/2023    ASSESSMENT:  1. Migraine without aura and without status migrainosus, not intractable      PLAN:  - For migraine prevention - continue emgality 120 mg SQ monthly, topiramate 50 mg bid   - for acute migraines - maxalt 10 mg mlt   - Continue tracking headaches   - RTC in 6 months or sooner if needed    Orders Placed This Encounter    topiramate (TOPAMAX) 50 MG tablet     Questions and concerns were sought and answered to the patient's stated verbal satisfaction.  The patient verbalizes understanding and agreement with the above stated treatment plan.     CC: Didi Grewal, ESPERANZA Haas, MERARI-C  [facility]   [phone]    Dr. Rashid was available during today's encounter.

## 2023-06-26 RX ORDER — TOPIRAMATE 50 MG/1
50 TABLET, FILM COATED ORAL 2 TIMES DAILY
Qty: 180 TABLET | Refills: 1 | Status: SHIPPED | OUTPATIENT
Start: 2023-06-26 | End: 2024-03-06 | Stop reason: DRUGHIGH

## 2023-06-29 ENCOUNTER — PATIENT MESSAGE (OUTPATIENT)
Dept: PAIN MEDICINE | Facility: CLINIC | Age: 53
End: 2023-06-29
Payer: COMMERCIAL

## 2023-07-19 ENCOUNTER — PATIENT MESSAGE (OUTPATIENT)
Dept: PAIN MEDICINE | Facility: CLINIC | Age: 53
End: 2023-07-19
Payer: COMMERCIAL

## 2023-07-19 DIAGNOSIS — M47.816 SPONDYLOSIS OF LUMBAR REGION WITHOUT MYELOPATHY OR RADICULOPATHY: Primary | ICD-10-CM

## 2023-08-23 ENCOUNTER — PATIENT MESSAGE (OUTPATIENT)
Dept: ADMINISTRATIVE | Facility: OTHER | Age: 53
End: 2023-08-23
Payer: COMMERCIAL

## 2023-08-28 ENCOUNTER — HOSPITAL ENCOUNTER (OUTPATIENT)
Facility: OTHER | Age: 53
Discharge: HOME | End: 2023-08-28
Attending: ANESTHESIOLOGY | Admitting: ANESTHESIOLOGY
Payer: COMMERCIAL

## 2023-08-28 VITALS
HEIGHT: 64 IN | HEART RATE: 87 BPM | OXYGEN SATURATION: 97 % | WEIGHT: 148 LBS | BODY MASS INDEX: 25.27 KG/M2 | TEMPERATURE: 98 F | RESPIRATION RATE: 16 BRPM | SYSTOLIC BLOOD PRESSURE: 150 MMHG | DIASTOLIC BLOOD PRESSURE: 70 MMHG

## 2023-08-28 DIAGNOSIS — G89.29 CHRONIC PAIN: ICD-10-CM

## 2023-08-28 DIAGNOSIS — M47.819 SPONDYLOSIS WITHOUT MYELOPATHY: ICD-10-CM

## 2023-08-28 DIAGNOSIS — M47.816 OSTEOARTHRITIS OF LUMBAR SPINE, UNSPECIFIED SPINAL OSTEOARTHRITIS COMPLICATION STATUS: ICD-10-CM

## 2023-08-28 DIAGNOSIS — M47.817 SPONDYLOSIS OF LUMBOSACRAL REGION, UNSPECIFIED SPINAL OSTEOARTHRITIS COMPLICATION STATUS: Primary | ICD-10-CM

## 2023-08-28 PROCEDURE — 64494 PR INJ DX/THER AGNT PARAVERT FACET JOINT,IMG GUIDE,LUMBAR/SAC, 2ND LEVEL: ICD-10-PCS | Mod: 50,,, | Performed by: ANESTHESIOLOGY

## 2023-08-28 PROCEDURE — 25000003 PHARM REV CODE 250: Performed by: ANESTHESIOLOGY

## 2023-08-28 PROCEDURE — 64493 INJ PARAVERT F JNT L/S 1 LEV: CPT | Mod: 50,,, | Performed by: ANESTHESIOLOGY

## 2023-08-28 PROCEDURE — 63600175 PHARM REV CODE 636 W HCPCS: Performed by: ANESTHESIOLOGY

## 2023-08-28 PROCEDURE — 64494 INJ PARAVERT F JNT L/S 2 LEV: CPT | Mod: 50,,, | Performed by: ANESTHESIOLOGY

## 2023-08-28 PROCEDURE — 64493 PR INJ DX/THER AGNT PARAVERT FACET JOINT,IMG GUIDE,LUMBAR/SAC,1ST LVL: ICD-10-PCS | Mod: 50,,, | Performed by: ANESTHESIOLOGY

## 2023-08-28 PROCEDURE — 64494 INJ PARAVERT F JNT L/S 2 LEV: CPT | Mod: LT | Performed by: ANESTHESIOLOGY

## 2023-08-28 PROCEDURE — 64493 INJ PARAVERT F JNT L/S 1 LEV: CPT | Mod: LT | Performed by: ANESTHESIOLOGY

## 2023-08-28 RX ORDER — BUPIVACAINE HYDROCHLORIDE 5 MG/ML
INJECTION, SOLUTION EPIDURAL; INTRACAUDAL
Status: DISCONTINUED | OUTPATIENT
Start: 2023-08-28 | End: 2023-08-28 | Stop reason: HOSPADM

## 2023-08-28 RX ORDER — LIDOCAINE HYDROCHLORIDE 20 MG/ML
INJECTION, SOLUTION INFILTRATION; PERINEURAL
Status: DISCONTINUED | OUTPATIENT
Start: 2023-08-28 | End: 2023-08-28 | Stop reason: HOSPADM

## 2023-08-28 RX ORDER — SODIUM CHLORIDE, SODIUM LACTATE, POTASSIUM CHLORIDE, CALCIUM CHLORIDE 600; 310; 30; 20 MG/100ML; MG/100ML; MG/100ML; MG/100ML
INJECTION, SOLUTION INTRAVENOUS CONTINUOUS
Status: DISCONTINUED | OUTPATIENT
Start: 2023-08-28 | End: 2023-08-28 | Stop reason: HOSPADM

## 2023-08-28 NOTE — DISCHARGE INSTRUCTIONS

## 2023-08-28 NOTE — H&P
HPI  Patient presenting for Procedure(s) (LRB):  BLOCK, NERVE, BILATERAL L3-L4-L5 MEDIAL BRANCH SOONER DATE (Bilateral)     Patient on Anti-coagulation No    No health changes since previous encounter    Past Medical History:   Diagnosis Date    Allergy     Chronic allergic rhinitis 3/19/2016    Endometriosis     Laryngopharyngeal reflux disease 03/2018    PONV (postoperative nausea and vomiting)     Recurrent upper respiratory infection (URI)      Past Surgical History:   Procedure Laterality Date    ABDOMINAL SURGERY      endometrosis    ADENOIDECTOMY      INJECTION OF FACET JOINT Right 5/11/2023    Procedure: INJECTION, FACET JOINT RIGHT L5/S1;  Surgeon: Andra Reich MD;  Location: [facility] PAIN T;  Service: Pain Management;  Laterality: Right;    NASAL SEPTUM SURGERY      SHOULDER SURGERY      SINUS SURGERY      TONSILLECTOMY       Review of patient's allergies indicates:  No Known Allergies   No current facility-administered medications for this encounter.       PMHx, PSHx, Allergies, Medications reviewed in epic    ROS negative except pain complaints in HPI    OBJECTIVE:    There were no vitals taken for this visit.    PHYSICAL EXAMINATION:    GENERAL: Well appearing, in no acute distress, alert and oriented x3.  PSYCH:  Mood and affect appropriate.  SKIN: Skin color, texture, turgor normal, no rashes or lesions which will impact the procedure.  CV: RRR with palpation of the radial artery.  PULM: No evidence of respiratory difficulty, symmetric chest rise. Clear to auscultation.  NEURO: Cranial nerves grossly intact.    Plan:    Proceed with procedure as planned Procedure(s) (LRB):  BLOCK, NERVE, BILATERAL L3-L4-L5 MEDIAL BRANCH SOONER DATE (Bilateral)    Ana M Souza  08/28/2023

## 2023-08-28 NOTE — DISCHARGE SUMMARY
Discharge Note  Short Stay      SUMMARY     Admit Date: 8/28/2023    Attending Physician: Andra Reich      Discharge Physician: Dat Crowley      Discharge Date: 8/28/2023 9:31 AM    Procedure(s) (LRB):  BLOCK, NERVE, BILATERAL L3-L4-L5 MEDIAL BRANCH SOONER DATE (Bilateral)    Final Diagnosis: Spondylosis of cervical region without myelopathy or radiculopathy [M47.812]    Disposition: Home or self care    Patient Instructions:   Current Discharge Medication List        CONTINUE these medications which have NOT CHANGED    Details   baclofen (LIORESAL) 10 MG tablet TAKE 1 TABLET BY MOUTH EVERY 8 HOURS AS NEEDED FOR MUSCLE SPASM  Qty: 270 tablet, Refills: 0    Associated Diagnoses: Migraine without aura, intractable, without status migrainosus      cholecalciferol, vitamin D3, 125 mcg (5,000 unit) Tab Take by mouth.      cyanocobalamin 1,000 mcg/mL injection every 14 (fourteen) days.   Refills: 0      famotidine (PEPCID) 40 MG tablet       gabapentin (NEURONTIN) 300 MG capsule Take 2 capsules (600 mg total) by mouth every evening.  Qty: 60 capsule, Refills: 11    Associated Diagnoses: Lumbar radiculopathy; Cervical radiculopathy      galcanezumab-gnlm 120 mg/mL PnIj Inject 120 mg into the skin every 28 days. Begin 28 days after loading dose.  Qty: 1 mL, Refills: 10    Associated Diagnoses: Chronic migraine without aura without status migrainosus, not intractable; Migraine without aura, intractable, without status migrainosus      rizatriptan (MAXALT-MLT) 10 MG disintegrating tablet Take 1 tablet (10 mg total) by mouth every 2 (two) hours as needed for Migraine. Max 30 mg/day.  Qty: 12 tablet, Refills: 5    Associated Diagnoses: Intractable migraine without aura and without status migrainosus      topiramate (TOPAMAX) 50 MG tablet Take 1 tablet (50 mg total) by mouth 2 (two) times daily.  Qty: 180 tablet, Refills: 1    Associated Diagnoses: Migraine without aura and without status migrainosus, not intractable       triamcinolone acetonide 0.1% (KENALOG) 0.1 % ointment Apply topically 2 (two) times daily.  Qty: 30 g, Refills: 1                 Discharge Diagnosis: Spondylosis of cervical region without myelopathy or radiculopathy [M47.812]  Condition on Discharge: Stable with no complications to procedure   Diet on Discharge: Same as before.  Activity: as per instruction sheet.  Discharge to: Home with a responsible adult.  Follow up: 2-4 weeks       Please call my office or pager at [phone] if experienced any weakness or loss of sensation, fever > 101.5, pain uncontrolled with oral medications, persistent nausea/vomiting/or diarrhea, redness or drainage from the incisions, or any other worrisome concerns. If physician on call was not reached or could not communicate with our office for any reason please go to the nearest emergency department

## 2023-08-28 NOTE — OP NOTE
Diagnostic Lumbar Medial Branch Block Under Fluoroscopy    The procedure, risks, benefits, and options were discussed with the patient. There are no contraindications to the procedure. The patent expressed understanding and agreed to the procedure. Informed written consent was obtained prior to the start of the procedure and can be found in the patient's chart.    PATIENT NAME: Chaya Dee   MRN: [id number]     DATE OF PROCEDURE: 08/28/2023                                           PROCEDURE:  Diagnostic Bilateral L3, L4, and L5 Lumbar Medial Branch Block under Fluoroscopy    PRE-OP DIAGNOSIS: Spondylosis of cervical region without myelopathy or radiculopathy [M47.812] Lumbar spondylosis [M47.816]    POST-OP DIAGNOSIS: Same    PHYSICIAN: Andra Reich MD    ASSISTANTS: Blake Fechtel, M.D. [facility]      MEDICATIONS INJECTED:  Bupivicaine 0.25%    LOCAL ANESTHETIC INJECTED:   Xylocaine 2%    SEDATION: None    ESTIMATED BLOOD LOSS:  None    COMPLICATIONS:  None.    INTERVAL HISTORY: Patient has clinical and imaging findings suggestive of facet mediated pain.    TECHNIQUE: Time-out was performed to identify the patient and procedure to be performed. With the patient laying in a prone position, the surgical area was prepped and draped in the usual sterile fashion using ChloraPrep and fenestrated drape. The levels were determined under fluoroscopic guidance. Skin anesthesia was achieved by injecting Lidocaine 2% over the injection sites. A 22 gauge, 3.5 inch needle was introduced into the medial branch nerves at the junctions of the superior articular process and the transverse processes of the targeted sites using AP, lateral and/or contralateral oblique fluoroscopic imaging. After negative aspiration for blood or CSF was confirmed, 1 mL of the anesthetic listed above was then slowly injected at each site. The needles were removed and bleeding was nil. A sterile dressing was applied. No specimens collected.  The patient tolerated the procedure well.   PAIN BEFORE THE PROCEDURE: 6/10    PAIN AFTER THE PROCEDURE: 4/10     I reviewed and edited the fellow's note. I conducted my own interview and physical examination. I agree with the findings. I was present and supervising all critical portions of the procedure.    Dat Crowley MD   The patient was monitored after the procedure in the recovery area. They were given post-procedure and discharge instructions to follow at home. The patient was discharged in a stable condition.

## 2023-08-29 ENCOUNTER — TELEPHONE (OUTPATIENT)
Dept: PAIN MEDICINE | Facility: CLINIC | Age: 53
End: 2023-08-29
Payer: COMMERCIAL

## 2023-08-29 NOTE — TELEPHONE ENCOUNTER
----- Message from Errol Crandall sent at 8/29/2023 11:40 AM CDT -----  Contact: Patient  Type:  Patient Call          Who Called:        Does the patient know what this is regarding?: Requesting a call back pt said that she would like to send her block diary and she would like to know the correct way of sending it ; please advise           Would the patient rather a call back or a response via [facility]?call           Best Call Back Number: [phone] (home)              Additional Information:

## 2023-08-29 NOTE — TELEPHONE ENCOUNTER
Pain level prior to procedure 6-7/10  Pain Level after procedure 6-7/10- no change  Percentage of relief 0/10    Everything exact same- no improvement up to 24 hours after the procedure 1st BLOCK, NERVE, BILATERAL L3-L4-L5 MEDIAL BRANCH     Offered patient appointment, she accepted.

## 2023-08-30 ENCOUNTER — OFFICE VISIT (OUTPATIENT)
Dept: PAIN MEDICINE | Facility: CLINIC | Age: 53
End: 2023-08-30
Attending: ANESTHESIOLOGY
Payer: COMMERCIAL

## 2023-08-30 ENCOUNTER — PATIENT MESSAGE (OUTPATIENT)
Dept: ADMINISTRATIVE | Facility: OTHER | Age: 53
End: 2023-08-30
Payer: COMMERCIAL

## 2023-08-30 ENCOUNTER — PATIENT MESSAGE (OUTPATIENT)
Dept: PAIN MEDICINE | Facility: OTHER | Age: 53
End: 2023-08-30
Payer: COMMERCIAL

## 2023-08-30 VITALS
BODY MASS INDEX: 25.39 KG/M2 | RESPIRATION RATE: 18 BRPM | DIASTOLIC BLOOD PRESSURE: 71 MMHG | WEIGHT: 147.94 LBS | HEART RATE: 84 BPM | OXYGEN SATURATION: 100 % | SYSTOLIC BLOOD PRESSURE: 107 MMHG

## 2023-08-30 DIAGNOSIS — M53.3 SACROILIAC DYSFUNCTION: Primary | ICD-10-CM

## 2023-08-30 DIAGNOSIS — G89.29 OTHER CHRONIC PAIN: ICD-10-CM

## 2023-08-30 PROCEDURE — 99999 PR PBB SHADOW E&M-EST. PATIENT-LVL III: CPT | Mod: PBBFAC,,, | Performed by: ANESTHESIOLOGY

## 2023-08-30 PROCEDURE — 99213 PR OFFICE/OUTPT VISIT, EST, LEVL III, 20-29 MIN: ICD-10-PCS | Mod: S$GLB,,, | Performed by: ANESTHESIOLOGY

## 2023-08-30 PROCEDURE — 99213 OFFICE O/P EST LOW 20 MIN: CPT | Mod: S$GLB,,, | Performed by: ANESTHESIOLOGY

## 2023-08-30 PROCEDURE — 99999 PR PBB SHADOW E&M-EST. PATIENT-LVL III: ICD-10-PCS | Mod: PBBFAC,,, | Performed by: ANESTHESIOLOGY

## 2023-08-30 NOTE — PROGRESS NOTES
"Chronic patient Established Note (Follow up visit)      SUBJECTIVE:  Interval History 8/30/23:  The patient returns to clinic today for f/u of her lower right sided back pain. She is now s/p right L3,4,5 MBB with Dr. Reich on 8/28/23 with no relief from the procedure. She still describes right sided low back pain that does not spread into her leg. She is also now complaining of right buttock pain that is spreading anteriorly around the right lower portion of her abdomen just above her groin. She continues to do yoga 2x a week and states she gets some relief but not much. She takes tylenol PRN for relief as well.       Interval History 5/25/2023:  The patient returns to clinic today for follow up of back pain via virtual visit. She is s/p right L5/S1 facet joint injections on 5/11/2023. She reports no relief of her pain. She continues to report right sided low back pain. She also reports tailbone pain. She denies any radicular leg pain. Her pain is worse with prolonged sitting, standing, and walking. She does report muscle spasms today. She continues to report neck pain. She also has headaches. She is taking Gabapentin and Baclofen. She does perform home exercises. She denies any other health changes.        Interval History 3/10/2023:  Chaya Dee presents to the clinic for a follow-up appointment for neck and low back pain. She continues to report low back and tailbone pain, right side greater than left. She denies any radiating leg pain at this time. Her pain is worse with prolonged sitting and standing. She also endorses pain with prolonged walking. She also reports neck pain and headaches. She has recently completed [facility] with some benefit. She is taking Gabapentin and Baclofen. She denies any other health changes. Her pain today is 3/10.    HPI:  Patient has been having left-sided neck pain for approximately 7 years. Radiates to the occiput bilaterally, as well as "down the spine to between the " "shoulder blades." Is worse during the day with computer work, and prolonged standing. Previously radiated throughout the entire left arm, with numbness and tingling, but this ceased 1 year ago. Additionally reports migraines daily: described as dull bilateral frontal headache that radiates to the base of the skull. Is currently topiramate and Emgality, as well as rizatriptan as abortive. Topiramate has not been very helpful, and just started CGRP. No associated weakness in the upper extremities. Back pain is described as being in the low back bilaterally, along with pain in right-sided buttocks. Pain does not radiate to either leg, no paresthesias. Made worse with prolonged standing, walking. No associated weakness. No fevers, chills, night sweats, history of cancer. Has been taking gabapentin and baclofen, which helps her sleep at night but not otherwise.    Pain Disability Index Review:      1/25/2023     1:14 PM   Last 3 PDI Scores   Pain Disability Index (PDI) 36       Pain Medications:  Gabapentin  Baclofen    Opioid Contract: not applicable     report:  Not applicable    Pain Procedures:   5/11/23 - right L5/S1 Facet Injections - no relief  8/28/23 - Right L3,4,5 MBB - no relief      Physical Therapy/Home Exercise: yes    Imaging:  MRI Lumbar Spine 12/27/2022:  COMPARISON:  Radiographs 12/16/2022     FINDINGS:  Alignment: Normal.     Vertebrae: Normal marrow signal. No fracture.     Discs: Disc desiccation annular fissure at L5-S1.  No evidence for discitis.     Cord: Conus terminates at L1 and appears unremarkable.  Cauda equina appears unremarkable.     Degenerative findings:     T12-L1: No spinal canal stenosis or neural foraminal narrowing.     L1-L2: No spinal canal stenosis or neural foraminal narrowing.     L2-L3: No spinal canal stenosis or neural foraminal narrowing.     L3-L4: Mild facet arthropathy.  No spinal canal stenosis or neural foraminal narrowing.     L4-L5: Circumferential disc bulge and " mild facet arthropathy result in mild bilateral neural foraminal narrowing.     L5-S1: Circumferential disc bulge with annular fissure and mild facet arthropathy.  No spinal canal stenosis or neural foraminal narrowing.     Paraspinal muscles & soft tissues: Unremarkable.     Impression:     1. Overall mild degenerative changes of the lower lumbar spine.    MRI Cervical Spine 12/27/2022:  COMPARISON:  Radiographs 12/16/2022.     FINDINGS:  C1-C2: Dens is intact.  Pre dens space is maintained.     Alignment: Grade 1 anterolisthesis of C2-C3. Straightening of lordosis.     Vertebrae: No fracture or marrow infiltrative process.  Reactive bone marrow edema seen about the left C2-C3 facet joint.     Discs: Date disc heights are maintained.  No evidence for discitis.     Cord: Normal.     Skull base and craniocervical junction: Normal.     Degenerative findings:     C2-C3: Uncovertebral spurring and severe facet arthropathy result in moderate left neural foraminal narrowing.     C3-C4: Posterior disc osteophyte complex noted.  No spinal canal stenosis or neural foraminal narrowing.     C4-C5: Uncovertebral spurring and moderate facet arthropathy result in mild left neural foraminal narrowing.     C5-C6: Posterior disc osteophyte complex with uncovertebral spurring and moderate facet arthropathy result in moderate spinal canal stenosis and moderate bilateral neural foraminal narrowing.     C6-C7: Posterior disc osteophyte complex and moderate facet arthropathy noted.  No spinal canal stenosis or neural foraminal narrowing.  Note made of bilateral perineural sleeve cysts.     C7-T1: No spinal canal stenosis or neural foraminal narrowing.  Note made of bilateral perineural sleeve cysts.     Paraspinal muscles & soft tissues: Unremarkable.     Impression:     1. Multilevel degenerative changes of the cervical spine detailed above.  Note made of moderate spinal canal stenosis and neural foraminal narrowing at C5-C6.  Severe  left facet arthropathy with bone marrow edema noted at C2-C3.            Allergies: Review of patient's allergies indicates:  No Known Allergies    Current Medications:   Current Outpatient Medications   Medication Sig Dispense Refill    baclofen (LIORESAL) 10 MG tablet TAKE 1 TABLET BY MOUTH EVERY 8 HOURS AS NEEDED FOR MUSCLE SPASM 270 tablet 0    cholecalciferol, vitamin D3, 125 mcg (5,000 unit) Tab Take by mouth.      cyanocobalamin 1,000 mcg/mL injection every 14 (fourteen) days.   0    famotidine (PEPCID) 40 MG tablet       gabapentin (NEURONTIN) 300 MG capsule Take 2 capsules (600 mg total) by mouth every evening. 60 capsule 11    galcanezumab-gnlm 120 mg/mL PnIj Inject 120 mg into the skin every 28 days. Begin 28 days after loading dose. 1 mL 10    rizatriptan (MAXALT-MLT) 10 MG disintegrating tablet Take 1 tablet (10 mg total) by mouth every 2 (two) hours as needed for Migraine. Max 30 mg/day. 12 tablet 5    topiramate (TOPAMAX) 50 MG tablet Take 1 tablet (50 mg total) by mouth 2 (two) times daily. 180 tablet 1    triamcinolone acetonide 0.1% (KENALOG) 0.1 % ointment Apply topically 2 (two) times daily. 30 g 1     No current facility-administered medications for this visit.       REVIEW OF SYSTEMS:    GENERAL:  No weight loss, malaise or fevers.  HEENT:  Negative for frequent or significant headaches.  NECK:  Negative for lumps, goiter, pain and significant neck swelling.  RESPIRATORY:  Negative for cough, wheezing or shortness of breath.  CARDIOVASCULAR:  Negative for chest pain, leg swelling or palpitations.  GI:  Negative for abdominal discomfort, blood in stools or black stools or change in bowel habits.  MUSCULOSKELETAL:  See HPI. +back/buttock pain  SKIN:  Negative for lesions, rash, and itching.  PSYCH:  Negative for sleep disturbance, mood disorder and recent psychosocial stressors.  HEMATOLOGY/LYMPHOLOGY:  Negative for prolonged bleeding, bruising easily or swollen nodes.  NEURO:   No history of  syncope, paralysis, seizures or tremors. Migraine headaches.   All other reviewed and negative other than HPI.    Past Medical History:  Past Medical History:   Diagnosis Date    Allergy     Chronic allergic rhinitis 3/19/2016    Endometriosis     Laryngopharyngeal reflux disease 2018    PONV (postoperative nausea and vomiting)     Recurrent upper respiratory infection (URI)        Past Surgical History:  Past Surgical History:   Procedure Laterality Date    ABDOMINAL SURGERY      endometrosis    ADENOIDECTOMY      INJECTION OF ANESTHETIC AGENT AROUND NERVE Bilateral 2023    Procedure: BLOCK, NERVE, BILATERAL L3-L4-L5 MEDIAL BRANCH SOONER DATE;  Surgeon: Andra Reich MD;  Location: [facility] PAIN MGT;  Service: Pain Management;  Laterality: Bilateral;    INJECTION OF FACET JOINT Right 2023    Procedure: INJECTION, FACET JOINT RIGHT L5/S1;  Surgeon: Andra Reich MD;  Location: [facility] PAIN MGT;  Service: Pain Management;  Laterality: Right;    NASAL SEPTUM SURGERY      SHOULDER SURGERY      SINUS SURGERY      TONSILLECTOMY         Family History:  Family History   Problem Relation Age of Onset    Thyroid disease Sister     Allergic rhinitis Mother     Thyroid disease Maternal Aunt     Thyroid disease Maternal Uncle     Diabetes Maternal Grandmother         late in life    Heart disease Maternal Grandmother         Congestive heart failure    Stroke Paternal Grandfather          of stroke    Cancer Paternal Aunt         uterine       Social History:  Social History     Socioeconomic History    Marital status:    Tobacco Use    Smoking status: Never    Smokeless tobacco: Never   Substance and Sexual Activity    Alcohol use: Yes     Alcohol/week: 2.0 standard drinks of alcohol     Types: 2 Glasses of wine per week     Comment: socially    Drug use: No    Sexual activity: Not Currently     Partners: Male     Birth control/protection: Other-see comments, None     Comment: No longer taking.  In menopause.        OBJECTIVE:    /71   Pulse 84   Resp 18   Wt 67.1 kg (147 lb 14.9 oz)   SpO2 100%   BMI 25.39 kg/m²     PHYSICAL EXAMINATION:    General appearance: Well appearing, in no acute distress, alert and oriented x3.  Psych:  Mood and affect appropriate.  Skin: Skin color, texture, turgor normal, no rashes or lesions, in both upper and lower body.  Head/face:  Atraumatic, normocephalic.  Cor: RRR  Pulm: Symmetric chest rise, no respiratory distress noted.   Back: Straight leg raising in the sitting position is negative to radicular pain. There is pain with palpation over right lower lumbar facet joints. Full ROM with pain on extension. Positive facet loading on the right.    Extremities: No deformities, edema, or skin discoloration. Good capillary refill.  Musculoskeletal: There is mild pain with palpation over right SI joint. There is pain with palpation over coccyx.  Provocative maneuvers for SI joint positive (patricks, compression, Gaenslens). Jose positive on the right. Bilateral lower extremity strength is normal and symmetric.  No atrophy or tone abnormalities are noted.  Neuro: No loss of sensation is noted. Straight leg negative.   Gait: Normal.    ASSESSMENT: 53 y.o. year old female with low back pain, consistent with the followin. Sacroiliac dysfunction  Procedure Order to Pain Management      2. Other chronic pain              PLAN:   We discussed with the patient the assessment and recommendations. The following is the plan we agreed on:   - Previous imaging was reviewed and discussed with the patient today.    - Schedule for right SI joint injection.     - We can also consider ganglion impar blocks in the future if patient does not get any relief.     - I have stressed the importance of physical activity and a home exercise plan to help with pain and improve health. She recently completed physical therapy.     - Patient can continue with medications for now since they are providing  benefits, using them appropriately, and without side effects.     - RTC 2 weeks after above procedure.     The above plan and management options were discussed at length with patient. Patient is in agreement with the above and verbalized understanding.    Ana M Souza MD PGY-3  [facility]   08/30/2023      I have personally taken the history and examined this patient and agree with the resident's note as stated above.

## 2023-09-22 ENCOUNTER — TELEPHONE (OUTPATIENT)
Dept: PAIN MEDICINE | Facility: CLINIC | Age: 53
End: 2023-09-22
Payer: COMMERCIAL

## 2023-09-22 NOTE — TELEPHONE ENCOUNTER
Staff spoke with the patient regarding appointment scheduled on 10/18/23 with . Staff informed the patient that her appointment will need to be rescheduled due to the provider being out. Patient appointment was rescheduled to 10/16/23 for 2:40 pm with MERARI Hay.

## 2023-09-23 DIAGNOSIS — G43.019 MIGRAINE WITHOUT AURA, INTRACTABLE, WITHOUT STATUS MIGRAINOSUS: ICD-10-CM

## 2023-09-26 RX ORDER — BACLOFEN 10 MG/1
TABLET ORAL
Qty: 270 TABLET | Refills: 0 | Status: SHIPPED | OUTPATIENT
Start: 2023-09-26 | End: 2023-12-21

## 2023-10-05 ENCOUNTER — HOSPITAL ENCOUNTER (OUTPATIENT)
Facility: OTHER | Age: 53
Discharge: HOME | End: 2023-10-05
Attending: ANESTHESIOLOGY | Admitting: ANESTHESIOLOGY
Payer: COMMERCIAL

## 2023-10-05 VITALS
DIASTOLIC BLOOD PRESSURE: 62 MMHG | TEMPERATURE: 97 F | OXYGEN SATURATION: 99 % | BODY MASS INDEX: 25.61 KG/M2 | WEIGHT: 150 LBS | RESPIRATION RATE: 16 BRPM | HEIGHT: 64 IN | SYSTOLIC BLOOD PRESSURE: 118 MMHG | HEART RATE: 67 BPM

## 2023-10-05 DIAGNOSIS — G89.29 CHRONIC PAIN: ICD-10-CM

## 2023-10-05 DIAGNOSIS — M46.1 SACROILIITIS: Primary | ICD-10-CM

## 2023-10-05 PROCEDURE — 25500020 PHARM REV CODE 255: Performed by: ANESTHESIOLOGY

## 2023-10-05 PROCEDURE — 27096 PR INJECTION,SACROILIAC JOINT: ICD-10-PCS | Mod: RT,,, | Performed by: ANESTHESIOLOGY

## 2023-10-05 PROCEDURE — 27096 INJECT SACROILIAC JOINT: CPT | Mod: RT,,, | Performed by: ANESTHESIOLOGY

## 2023-10-05 PROCEDURE — 25000003 PHARM REV CODE 250: Performed by: STUDENT IN AN ORGANIZED HEALTH CARE EDUCATION/TRAINING PROGRAM

## 2023-10-05 PROCEDURE — 25000003 PHARM REV CODE 250: Performed by: ANESTHESIOLOGY

## 2023-10-05 PROCEDURE — 63600175 PHARM REV CODE 636 W HCPCS: Performed by: ANESTHESIOLOGY

## 2023-10-05 PROCEDURE — 27096 INJECT SACROILIAC JOINT: CPT | Mod: RT | Performed by: ANESTHESIOLOGY

## 2023-10-05 RX ORDER — MIDAZOLAM HYDROCHLORIDE 1 MG/ML
INJECTION INTRAMUSCULAR; INTRAVENOUS
Status: DISCONTINUED | OUTPATIENT
Start: 2023-10-05 | End: 2023-10-05 | Stop reason: HOSPADM

## 2023-10-05 RX ORDER — BUPIVACAINE HYDROCHLORIDE 2.5 MG/ML
INJECTION, SOLUTION EPIDURAL; INFILTRATION; INTRACAUDAL
Status: DISCONTINUED | OUTPATIENT
Start: 2023-10-05 | End: 2023-10-05 | Stop reason: HOSPADM

## 2023-10-05 RX ORDER — LIDOCAINE HYDROCHLORIDE 20 MG/ML
INJECTION, SOLUTION INFILTRATION; PERINEURAL
Status: DISCONTINUED | OUTPATIENT
Start: 2023-10-05 | End: 2023-10-05 | Stop reason: HOSPADM

## 2023-10-05 RX ORDER — TRIAMCINOLONE ACETONIDE 40 MG/ML
INJECTION, SUSPENSION INTRA-ARTICULAR; INTRAMUSCULAR
Status: DISCONTINUED | OUTPATIENT
Start: 2023-10-05 | End: 2023-10-05 | Stop reason: HOSPADM

## 2023-10-05 RX ORDER — FENTANYL CITRATE 50 UG/ML
INJECTION, SOLUTION INTRAMUSCULAR; INTRAVENOUS
Status: DISCONTINUED | OUTPATIENT
Start: 2023-10-05 | End: 2023-10-05 | Stop reason: HOSPADM

## 2023-10-05 RX ORDER — SODIUM CHLORIDE 9 MG/ML
INJECTION, SOLUTION INTRAVENOUS CONTINUOUS
Status: DISCONTINUED | OUTPATIENT
Start: 2023-10-05 | End: 2023-10-05 | Stop reason: HOSPADM

## 2023-10-05 NOTE — OP NOTE
Sacroiliac Joint Injection under Fluoroscopic Guidance    The procedure, risks, benefits, and options were discussed with the patient. There are no contraindications to the procedure. The patent expressed understanding and agreed to the procedure. Informed written consent was obtained prior to the start of the procedure and can be found in the patient's chart.    PATIENT NAME: Chaya Dee   MRN: [id number]     DATE OF PROCEDURE: 10/05/2023    PROCEDURE: Right Sacroiliac Joint Injection under Fluoroscopic Guidance    PRE-OP DIAGNOSIS: Sacroiliac dysfunction [M53.3]    POST-OP DIAGNOSIS: Same    PHYSICIAN: Andra Reich MD    ASSISTANTS: Jacob Gallardo MD Fellow      MEDICATIONS INJECTED: Preservative-free Kenalog 40mg with 3cc of Bupivacine 0.25%     LOCAL ANESTHETIC INJECTED: Xylocaine 2%     SEDATION: Versed 1mg and Fentanyl 25mcg                                                                                                                                                                                     Conscious sedation ordered by M.D. Patient re-evaluation prior to administration of conscious sedation. No changes noted in patient's status from initial evaluation. The patient's vital signs were monitored by RN and patient remained hemodynamically stable throughout the procedure.    Event Time In   Sedation Start 0909   Sedation End 0913       ESTIMATED BLOOD LOSS: None    COMPLICATIONS: None    TECHNIQUE: Time-out was performed to identify the patient and procedure to be performed. With the patient laying in a prone position, the surgical area was prepped and draped in the usual sterile fashion using ChloraPrep and a fenestrated drape. The sacroiliac joint was determined under fluoroscopy guidance. Skin anesthesia was achieved by injecting Lidocaine 2% over the injection site. The sacroiliac joint was  then approached with a 25 gauge, 3.5 inch spinal quinke needle that was introduced under fluoroscopic  guidance in the AP and Lateral views. Once the needle tip was in the area of the joint, and there was no blood, contrast dye Omnipaque (300mg/mL) was injected to confirm placement and there was no vascular runoff. Fluoroscopic imaging in the AP and lateral views revealed a clear outline of the joint space. 4 mL of the medication mixture listed above was injected slowly intraarticular and malia-articular. Displacement of the radio opaque contrast after injection of the medication confirmed that the medication went into the area of the joint. The needles were removed and bleeding was nil.  A sterile dressing was applied. No specimens collected. The patient tolerated the procedure well.     PRE-PROCEDURE PAIN SCORE: 4-8/10    POST-PROCEDURE PAIN SCORE: 6/10    The patient was monitored after the procedure in the recovery area. They were given post-procedure and discharge instructions to follow at home. The patient was discharged in a stable condition.        Andra Reich MD

## 2023-10-05 NOTE — DISCHARGE INSTRUCTIONS

## 2023-10-05 NOTE — H&P
"HPI  Patient presenting for Procedure(s) (LRB):  INJECTION,SACROILIAC JOINT, RIGHT  SOONER DATE (Right)     Patient on Anti-coagulation No    No health changes since previous encounter    Past Medical History:   Diagnosis Date    Allergy     Chronic allergic rhinitis 3/19/2016    Endometriosis     Laryngopharyngeal reflux disease 03/2018    PONV (postoperative nausea and vomiting)     Recurrent upper respiratory infection (URI)      Past Surgical History:   Procedure Laterality Date    ABDOMINAL SURGERY      endometrosis    ADENOIDECTOMY      INJECTION OF ANESTHETIC AGENT AROUND NERVE Bilateral 8/28/2023    Procedure: BLOCK, NERVE, BILATERAL L3-L4-L5 MEDIAL BRANCH SOONER DATE;  Surgeon: Andra Reich MD;  Location: [facility];  Service: Pain Management;  Laterality: Bilateral;    INJECTION OF FACET JOINT Right 5/11/2023    Procedure: INJECTION, FACET JOINT RIGHT L5/S1;  Surgeon: Andra Reich MD;  Location: [facility];  Service: Pain Management;  Laterality: Right;    NASAL SEPTUM SURGERY      SHOULDER SURGERY      SINUS SURGERY      TONSILLECTOMY       Review of patient's allergies indicates:  No Known Allergies   Current Facility-Administered Medications   Medication    0.9%  NaCl infusion       PMHx, PSHx, Allergies, Medications reviewed in epic    ROS negative except pain complaints in HPI    OBJECTIVE:    /76 (BP Location: Right arm, Patient Position: Lying)   Pulse 79   Temp 97.4 °F (36.3 °C) (Oral)   Resp 16   Ht 5' 4" (1.626 m)   Wt 68 kg (150 lb)   SpO2 100%   Breastfeeding No   BMI 25.75 kg/m²     PHYSICAL EXAMINATION:    GENERAL: Well appearing, in no acute distress, alert and oriented x3.  PSYCH:  Mood and affect appropriate.  SKIN: Skin color, texture, turgor normal, no rashes or lesions which will impact the procedure.  CV: RRR with palpation of the radial artery.  PULM: No evidence of respiratory difficulty, symmetric chest rise. Clear to auscultation.  NEURO: Cranial nerves grossly " intact.    Plan:    Proceed with procedure as planned Procedure(s) (LRB):  INJECTION,SACROILIAC JOINT, RIGHT  SOONER DATE (Right)    Jacob Gallardo MD  10/05/2023

## 2023-10-07 ENCOUNTER — PATIENT MESSAGE (OUTPATIENT)
Dept: ADMINISTRATIVE | Facility: OTHER | Age: 53
End: 2023-10-07
Payer: COMMERCIAL

## 2023-10-13 ENCOUNTER — TELEPHONE (OUTPATIENT)
Dept: PAIN MEDICINE | Facility: CLINIC | Age: 53
End: 2023-10-13
Payer: COMMERCIAL

## 2023-10-18 ENCOUNTER — OFFICE VISIT (OUTPATIENT)
Dept: PAIN MEDICINE | Facility: CLINIC | Age: 53
End: 2023-10-18
Attending: ANESTHESIOLOGY
Payer: COMMERCIAL

## 2023-10-18 VITALS
HEART RATE: 80 BPM | BODY MASS INDEX: 25.54 KG/M2 | TEMPERATURE: 98 F | DIASTOLIC BLOOD PRESSURE: 72 MMHG | RESPIRATION RATE: 18 BRPM | SYSTOLIC BLOOD PRESSURE: 110 MMHG | OXYGEN SATURATION: 100 % | WEIGHT: 148.81 LBS

## 2023-10-18 DIAGNOSIS — M51.36 DDD (DEGENERATIVE DISC DISEASE), LUMBAR: ICD-10-CM

## 2023-10-18 DIAGNOSIS — M47.819 SPONDYLOSIS WITHOUT MYELOPATHY: ICD-10-CM

## 2023-10-18 DIAGNOSIS — M47.816 OSTEOARTHRITIS OF LUMBAR SPINE, UNSPECIFIED SPINAL OSTEOARTHRITIS COMPLICATION STATUS: ICD-10-CM

## 2023-10-18 DIAGNOSIS — M47.816 SPONDYLOSIS OF LUMBAR REGION WITHOUT MYELOPATHY OR RADICULOPATHY: ICD-10-CM

## 2023-10-18 DIAGNOSIS — M53.3 COCCYDYNIA: Primary | ICD-10-CM

## 2023-10-18 DIAGNOSIS — M46.1 SACROILIITIS: ICD-10-CM

## 2023-10-18 PROCEDURE — 99999 PR PBB SHADOW E&M-EST. PATIENT-LVL III: ICD-10-PCS | Mod: PBBFAC,,, | Performed by: NURSE PRACTITIONER

## 2023-10-18 PROCEDURE — 99999 PR PBB SHADOW E&M-EST. PATIENT-LVL III: CPT | Mod: PBBFAC,,, | Performed by: NURSE PRACTITIONER

## 2023-10-18 PROCEDURE — 99214 OFFICE O/P EST MOD 30 MIN: CPT | Mod: S$GLB,,, | Performed by: NURSE PRACTITIONER

## 2023-10-18 PROCEDURE — 99214 PR OFFICE/OUTPT VISIT, EST, LEVL IV, 30-39 MIN: ICD-10-PCS | Mod: S$GLB,,, | Performed by: NURSE PRACTITIONER

## 2023-10-18 NOTE — PROGRESS NOTES
Chronic patient Established Note (Follow up visit)      SUBJECTIVE:  Interval History 10/18/2023  54 y/o female presents s/t a right SI joint injection per Dr. Reich she is reporting 0% relief of her symptoms. She would like to attempt other pain interventions to help with  there continued pain in the right  low back, tailbone, and right hip.   She denies any new pain, denies any B/B dysfunction denies any profound weakness, denies any recent falls or trauma.         Interval History 8/30/23:  The patient returns to clinic today for f/u of her lower right sided back pain. She is now s/p right L3,4,5 MBB with Dr. Reich on 8/28/23 with no relief from the procedure. She still describes right sided low back pain that does not spread into her leg. She is also now complaining of right buttock pain that is spreading anteriorly around the right lower portion of her abdomen just above her groin. She continues to do yoga 2x a week and states she gets some relief but not much. She takes tylenol PRN for relief as well.       Interval History 5/25/2023:  The patient returns to clinic today for follow up of back pain via virtual visit. She is s/p right L5/S1 facet joint injections on 5/11/2023. She reports no relief of her pain. She continues to report right sided low back pain. She also reports tailbone pain. She denies any radicular leg pain. Her pain is worse with prolonged sitting, standing, and walking. She does report muscle spasms today. She continues to report neck pain. She also has headaches. She is taking Gabapentin and Baclofen. She does perform home exercises. She denies any other health changes.        Interval History 3/10/2023:  Chaya Dee presents to the clinic for a follow-up appointment for neck and low back pain. She continues to report low back and tailbone pain, right side greater than left. She denies any radiating leg pain at this time. Her pain is worse with prolonged sitting and standing. She  "also endorses pain with prolonged walking. She also reports neck pain and headaches. She has recently completed [employer] [facility] with some benefit. She is taking Gabapentin and Baclofen. She denies any other health changes. Her pain today is 3/10.    HPI:  Patient has been having left-sided neck pain for approximately 7 years. Radiates to the occiput bilaterally, as well as "down the spine to between the shoulder blades." Is worse during the day with computer work, and prolonged standing. Previously radiated throughout the entire left arm, with numbness and tingling, but this ceased 1 year ago. Additionally reports migraines daily: described as dull bilateral frontal headache that radiates to the base of the skull. Is currently topiramate and Emgality, as well as rizatriptan as abortive. Topiramate has not been very helpful, and just started CGRP. No associated weakness in the upper extremities. Back pain is described as being in the low back bilaterally, along with pain in right-sided buttocks. Pain does not radiate to either leg, no paresthesias. Made worse with prolonged standing, walking. No associated weakness. No fevers, chills, night sweats, history of cancer. Has been taking gabapentin and baclofen, which helps her sleep at night but not otherwise.    Pain Disability Index Review:      10/18/2023     2:01 PM 1/25/2023     1:14 PM   Last 3 PDI Scores   Pain Disability Index (PDI) 35 36       Pain Medications:  Gabapentin  Baclofen    Opioid Contract: not applicable     report:  Not applicable    Pain Procedures:   10/05/2023 Right Sacroiliac Joint Injection 0% relief   5/11/23 - right L5/S1 Facet Injections - no relief  8/28/23 - Right L3,4,5 MBB - no relief      Physical Therapy/Home Exercise: yes    Imaging:  MRI Lumbar Spine 12/27/2022:  COMPARISON:  Radiographs 12/16/2022     FINDINGS:  Alignment: Normal.     Vertebrae: Normal marrow signal. No fracture.     Discs: Disc desiccation annular fissure at " L5-S1.  No evidence for discitis.     Cord: Conus terminates at L1 and appears unremarkable.  Cauda equina appears unremarkable.     Degenerative findings:     T12-L1: No spinal canal stenosis or neural foraminal narrowing.     L1-L2: No spinal canal stenosis or neural foraminal narrowing.     L2-L3: No spinal canal stenosis or neural foraminal narrowing.     L3-L4: Mild facet arthropathy.  No spinal canal stenosis or neural foraminal narrowing.     L4-L5: Circumferential disc bulge and mild facet arthropathy result in mild bilateral neural foraminal narrowing.     L5-S1: Circumferential disc bulge with annular fissure and mild facet arthropathy.  No spinal canal stenosis or neural foraminal narrowing.     Paraspinal muscles & soft tissues: Unremarkable.     Impression:     1. Overall mild degenerative changes of the lower lumbar spine.    MRI Cervical Spine 12/27/2022:  COMPARISON:  Radiographs 12/16/2022.     FINDINGS:  C1-C2: Dens is intact.  Pre dens space is maintained.     Alignment: Grade 1 anterolisthesis of C2-C3. Straightening of lordosis.     Vertebrae: No fracture or marrow infiltrative process.  Reactive bone marrow edema seen about the left C2-C3 facet joint.     Discs: Date disc heights are maintained.  No evidence for discitis.     Cord: Normal.     Skull base and craniocervical junction: Normal.     Degenerative findings:     C2-C3: Uncovertebral spurring and severe facet arthropathy result in moderate left neural foraminal narrowing.     C3-C4: Posterior disc osteophyte complex noted.  No spinal canal stenosis or neural foraminal narrowing.     C4-C5: Uncovertebral spurring and moderate facet arthropathy result in mild left neural foraminal narrowing.     C5-C6: Posterior disc osteophyte complex with uncovertebral spurring and moderate facet arthropathy result in moderate spinal canal stenosis and moderate bilateral neural foraminal narrowing.     C6-C7: Posterior disc osteophyte complex and  moderate facet arthropathy noted.  No spinal canal stenosis or neural foraminal narrowing.  Note made of bilateral perineural sleeve cysts.     C7-T1: No spinal canal stenosis or neural foraminal narrowing.  Note made of bilateral perineural sleeve cysts.     Paraspinal muscles & soft tissues: Unremarkable.     Impression:     1. Multilevel degenerative changes of the cervical spine detailed above.  Note made of moderate spinal canal stenosis and neural foraminal narrowing at C5-C6.  Severe left facet arthropathy with bone marrow edema noted at C2-C3.            Allergies: Review of patient's allergies indicates:  No Known Allergies    Current Medications:   Current Outpatient Medications   Medication Sig Dispense Refill    baclofen (LIORESAL) 10 MG tablet TAKE 1 TABLET BY MOUTH EVERY 8 HOURS AS NEEDED FOR MUSCLE SPASMS 270 tablet 0    cholecalciferol, vitamin D3, 125 mcg (5,000 unit) Tab Take by mouth.      galcanezumab-gnlm 120 mg/mL PnIj Inject 120 mg into the skin every 28 days. Begin 28 days after loading dose. 1 mL 10    rizatriptan (MAXALT-MLT) 10 MG disintegrating tablet Take 1 tablet (10 mg total) by mouth every 2 (two) hours as needed for Migraine. Max 30 mg/day. 12 tablet 5    topiramate (TOPAMAX) 50 MG tablet Take 1 tablet (50 mg total) by mouth 2 (two) times daily. 180 tablet 1     No current facility-administered medications for this visit.       REVIEW OF SYSTEMS:    GENERAL:  No weight loss, malaise or fevers.  HEENT:  Negative for frequent or significant headaches.  NECK:  Negative for lumps, goiter, pain and significant neck swelling.  RESPIRATORY:  Negative for cough, wheezing or shortness of breath.  CARDIOVASCULAR:  Negative for chest pain, leg swelling or palpitations.  GI:  Negative for abdominal discomfort, blood in stools or black stools or change in bowel habits.  MUSCULOSKELETAL:  See HPI. +back/buttock pain  SKIN:  Negative for lesions, rash, and itching.  PSYCH:  Negative for sleep  disturbance, mood disorder and recent psychosocial stressors.  HEMATOLOGY/LYMPHOLOGY:  Negative for prolonged bleeding, bruising easily or swollen nodes.  NEURO:   No history of syncope, paralysis, seizures or tremors. Migraine headaches.   All other reviewed and negative other than HPI.    Past Medical History:  Past Medical History:   Diagnosis Date    Allergy     Chronic allergic rhinitis 3/19/2016    Endometriosis     Laryngopharyngeal reflux disease 2018    PONV (postoperative nausea and vomiting)     Recurrent upper respiratory infection (URI)        Past Surgical History:  Past Surgical History:   Procedure Laterality Date    ABDOMINAL SURGERY      endometrosis    ADENOIDECTOMY      INJECTION OF ANESTHETIC AGENT AROUND NERVE Bilateral 2023    Procedure: BLOCK, NERVE, BILATERAL L3-L4-L5 MEDIAL BRANCH SOONER DATE;  Surgeon: Andra Reich MD;  Location: [facility];  Service: Pain Management;  Laterality: Bilateral;    INJECTION OF FACET JOINT Right 2023    Procedure: INJECTION, FACET JOINT RIGHT L5/S1;  Surgeon: Andra Reich MD;  Location: [facility];  Service: Pain Management;  Laterality: Right;    INJECTION, SACROILIAC JOINT Right 10/5/2023    Procedure: INJECTION,SACROILIAC JOINT, RIGHT  SOONER DATE;  Surgeon: Andra Reich MD;  Location: [facility];  Service: Pain Management;  Laterality: Right;    NASAL SEPTUM SURGERY      SHOULDER SURGERY      SINUS SURGERY      TONSILLECTOMY         Family History:  Family History   Problem Relation Age of Onset    Thyroid disease Sister     Allergic rhinitis Mother     Thyroid disease Maternal Aunt     Thyroid disease Maternal Uncle     Diabetes Maternal Grandmother         late in life    Heart disease Maternal Grandmother         Congestive heart failure    Stroke Paternal Grandfather          of stroke    Cancer Paternal Aunt         uterine       Social History:  Social History     Socioeconomic History    Marital status:    Tobacco  Use    Smoking status: Never    Smokeless tobacco: Never   Substance and Sexual Activity    Alcohol use: Yes     Alcohol/week: 2.0 standard drinks of alcohol     Types: 2 Glasses of wine per week     Comment: socially    Drug use: No    Sexual activity: Not Currently     Partners: Male     Birth control/protection: Other-see comments, None     Comment: No longer taking.  In menopause.       OBJECTIVE:    /72   Pulse 80   Temp 97.7 °F (36.5 °C) (Oral)   Resp 18   Wt 67.5 kg (148 lb 13 oz)   SpO2 100%   BMI 25.54 kg/m²     PHYSICAL EXAMINATION:    General appearance: Well appearing, in no acute distress, alert and oriented x3.  Psych:  Mood and affect appropriate.  Skin: Skin color, texture, turgor normal, no rashes or lesions, in both upper and lower body.  Head/face:  Atraumatic, normocephalic.  Cor: RRR  Pulm: Symmetric chest rise, no respiratory distress noted.   Back: Straight leg raising in the sitting position is negative to radicular pain. There is pain with palpation over right lower lumbar facet joints. Full ROM with pain on extension. Positive facet loading on the right.    Extremities: No deformities, edema, or skin discoloration. Good capillary refill.  Musculoskeletal: There is mild pain with palpation over right SI joint. There is pain with palpation over coccyx.  Provocative maneuvers for SI joint positive (patricks, compression, Gaenslens). Jose positive on the right. Bilateral lower extremity strength is normal and symmetric.  No atrophy or tone abnormalities are noted.  Neuro: No loss of sensation is noted. Straight leg negative.   Gait: Normal.    ASSESSMENT: 53 y.o. year old female with low back pain, consistent with the followin. Coccydynia        2. Sacroiliitis        3. Osteoarthritis of lumbar spine, unspecified spinal osteoarthritis complication status        4. Spondylosis of lumbar region without myelopathy or radiculopathy        5. DDD (degenerative disc disease),  lumbar        6. Spondylosis without myelopathy                PLAN:   We discussed with the patient the assessment and recommendations. The following is the plan we agreed on:   - Previous imaging was reviewed and discussed with the patient today.    - Schedule for ganglion impar block     - I have stressed the importance of physical activity and a home exercise plan to help with pain and improve health. She recently completed physical therapy.     - Patient can continue with medications for now since they are providing benefits, using them appropriately, and without side effects.     - RTC 2 weeks after above procedure.     The above plan and management options were discussed at length with patient. Patient is in agreement with the above and verbalized understanding.    SANTOSH Blas  Interventional Pain Management    10/18/2023

## 2023-10-18 NOTE — H&P (VIEW-ONLY)
Chronic patient Established Note (Follow up visit)      SUBJECTIVE:  Interval History 10/18/2023  54 y/o female presents s/t a right SI joint injection per Dr. Reich she is reporting 0% relief of her symptoms. She would like to attempt other pain interventions to help with  there continued pain in the right  low back, tailbone, and right hip.   She denies any new pain, denies any B/B dysfunction denies any profound weakness, denies any recent falls or trauma.         Interval History 8/30/23:  The patient returns to clinic today for f/u of her lower right sided back pain. She is now s/p right L3,4,5 MBB with Dr. Reich on 8/28/23 with no relief from the procedure. She still describes right sided low back pain that does not spread into her leg. She is also now complaining of right buttock pain that is spreading anteriorly around the right lower portion of her abdomen just above her groin. She continues to do yoga 2x a week and states she gets some relief but not much. She takes tylenol PRN for relief as well.       Interval History 5/25/2023:  The patient returns to clinic today for follow up of back pain via virtual visit. She is s/p right L5/S1 facet joint injections on 5/11/2023. She reports no relief of her pain. She continues to report right sided low back pain. She also reports tailbone pain. She denies any radicular leg pain. Her pain is worse with prolonged sitting, standing, and walking. She does report muscle spasms today. She continues to report neck pain. She also has headaches. She is taking Gabapentin and Baclofen. She does perform home exercises. She denies any other health changes.        Interval History 3/10/2023:  Chaya Dee presents to the clinic for a follow-up appointment for neck and low back pain. She continues to report low back and tailbone pain, right side greater than left. She denies any radiating leg pain at this time. Her pain is worse with prolonged sitting and standing. She  "also endorses pain with prolonged walking. She also reports neck pain and headaches. She has recently completed [facility] with some benefit. She is taking Gabapentin and Baclofen. She denies any other health changes. Her pain today is 3/10.    HPI:  Patient has been having left-sided neck pain for approximately 7 years. Radiates to the occiput bilaterally, as well as "down the spine to between the shoulder blades." Is worse during the day with computer work, and prolonged standing. Previously radiated throughout the entire left arm, with numbness and tingling, but this ceased 1 year ago. Additionally reports migraines daily: described as dull bilateral frontal headache that radiates to the base of the skull. Is currently topiramate and Emgality, as well as rizatriptan as abortive. Topiramate has not been very helpful, and just started CGRP. No associated weakness in the upper extremities. Back pain is described as being in the low back bilaterally, along with pain in right-sided buttocks. Pain does not radiate to either leg, no paresthesias. Made worse with prolonged standing, walking. No associated weakness. No fevers, chills, night sweats, history of cancer. Has been taking gabapentin and baclofen, which helps her sleep at night but not otherwise.    Pain Disability Index Review:      10/18/2023     2:01 PM 1/25/2023     1:14 PM   Last 3 PDI Scores   Pain Disability Index (PDI) 35 36       Pain Medications:  Gabapentin  Baclofen    Opioid Contract: not applicable     report:  Not applicable    Pain Procedures:   10/05/2023 Right Sacroiliac Joint Injection 0% relief   5/11/23 - right L5/S1 Facet Injections - no relief  8/28/23 - Right L3,4,5 MBB - no relief      Physical Therapy/Home Exercise: yes    Imaging:  MRI Lumbar Spine 12/27/2022:  COMPARISON:  Radiographs 12/16/2022     FINDINGS:  Alignment: Normal.     Vertebrae: Normal marrow signal. No fracture.     Discs: Disc desiccation annular fissure at " L5-S1.  No evidence for discitis.     Cord: Conus terminates at L1 and appears unremarkable.  Cauda equina appears unremarkable.     Degenerative findings:     T12-L1: No spinal canal stenosis or neural foraminal narrowing.     L1-L2: No spinal canal stenosis or neural foraminal narrowing.     L2-L3: No spinal canal stenosis or neural foraminal narrowing.     L3-L4: Mild facet arthropathy.  No spinal canal stenosis or neural foraminal narrowing.     L4-L5: Circumferential disc bulge and mild facet arthropathy result in mild bilateral neural foraminal narrowing.     L5-S1: Circumferential disc bulge with annular fissure and mild facet arthropathy.  No spinal canal stenosis or neural foraminal narrowing.     Paraspinal muscles & soft tissues: Unremarkable.     Impression:     1. Overall mild degenerative changes of the lower lumbar spine.    MRI Cervical Spine 12/27/2022:  COMPARISON:  Radiographs 12/16/2022.     FINDINGS:  C1-C2: Dens is intact.  Pre dens space is maintained.     Alignment: Grade 1 anterolisthesis of C2-C3. Straightening of lordosis.     Vertebrae: No fracture or marrow infiltrative process.  Reactive bone marrow edema seen about the left C2-C3 facet joint.     Discs: Date disc heights are maintained.  No evidence for discitis.     Cord: Normal.     Skull base and craniocervical junction: Normal.     Degenerative findings:     C2-C3: Uncovertebral spurring and severe facet arthropathy result in moderate left neural foraminal narrowing.     C3-C4: Posterior disc osteophyte complex noted.  No spinal canal stenosis or neural foraminal narrowing.     C4-C5: Uncovertebral spurring and moderate facet arthropathy result in mild left neural foraminal narrowing.     C5-C6: Posterior disc osteophyte complex with uncovertebral spurring and moderate facet arthropathy result in moderate spinal canal stenosis and moderate bilateral neural foraminal narrowing.     C6-C7: Posterior disc osteophyte complex and  moderate facet arthropathy noted.  No spinal canal stenosis or neural foraminal narrowing.  Note made of bilateral perineural sleeve cysts.     C7-T1: No spinal canal stenosis or neural foraminal narrowing.  Note made of bilateral perineural sleeve cysts.     Paraspinal muscles & soft tissues: Unremarkable.     Impression:     1. Multilevel degenerative changes of the cervical spine detailed above.  Note made of moderate spinal canal stenosis and neural foraminal narrowing at C5-C6.  Severe left facet arthropathy with bone marrow edema noted at C2-C3.            Allergies: Review of patient's allergies indicates:  No Known Allergies    Current Medications:   Current Outpatient Medications   Medication Sig Dispense Refill    baclofen (LIORESAL) 10 MG tablet TAKE 1 TABLET BY MOUTH EVERY 8 HOURS AS NEEDED FOR MUSCLE SPASMS 270 tablet 0    cholecalciferol, vitamin D3, 125 mcg (5,000 unit) Tab Take by mouth.      galcanezumab-gnlm 120 mg/mL PnIj Inject 120 mg into the skin every 28 days. Begin 28 days after loading dose. 1 mL 10    rizatriptan (MAXALT-MLT) 10 MG disintegrating tablet Take 1 tablet (10 mg total) by mouth every 2 (two) hours as needed for Migraine. Max 30 mg/day. 12 tablet 5    topiramate (TOPAMAX) 50 MG tablet Take 1 tablet (50 mg total) by mouth 2 (two) times daily. 180 tablet 1     No current facility-administered medications for this visit.       REVIEW OF SYSTEMS:    GENERAL:  No weight loss, malaise or fevers.  HEENT:  Negative for frequent or significant headaches.  NECK:  Negative for lumps, goiter, pain and significant neck swelling.  RESPIRATORY:  Negative for cough, wheezing or shortness of breath.  CARDIOVASCULAR:  Negative for chest pain, leg swelling or palpitations.  GI:  Negative for abdominal discomfort, blood in stools or black stools or change in bowel habits.  MUSCULOSKELETAL:  See HPI. +back/buttock pain  SKIN:  Negative for lesions, rash, and itching.  PSYCH:  Negative for sleep  disturbance, mood disorder and recent psychosocial stressors.  HEMATOLOGY/LYMPHOLOGY:  Negative for prolonged bleeding, bruising easily or swollen nodes.  NEURO:   No history of syncope, paralysis, seizures or tremors. Migraine headaches.   All other reviewed and negative other than HPI.    Past Medical History:  Past Medical History:   Diagnosis Date    Allergy     Chronic allergic rhinitis 3/19/2016    Endometriosis     Laryngopharyngeal reflux disease 2018    PONV (postoperative nausea and vomiting)     Recurrent upper respiratory infection (URI)        Past Surgical History:  Past Surgical History:   Procedure Laterality Date    ABDOMINAL SURGERY      endometrosis    ADENOIDECTOMY      INJECTION OF ANESTHETIC AGENT AROUND NERVE Bilateral 2023    Procedure: BLOCK, NERVE, BILATERAL L3-L4-L5 MEDIAL BRANCH SOONER DATE;  Surgeon: Andra Reich MD;  Location: [facility];  Service: Pain Management;  Laterality: Bilateral;    INJECTION OF FACET JOINT Right 2023    Procedure: INJECTION, FACET JOINT RIGHT L5/S1;  Surgeon: Andra Reich MD;  Location: [facility];  Service: Pain Management;  Laterality: Right;    INJECTION, SACROILIAC JOINT Right 10/5/2023    Procedure: INJECTION,SACROILIAC JOINT, RIGHT  SOONER DATE;  Surgeon: Andra Reich MD;  Location: [facility];  Service: Pain Management;  Laterality: Right;    NASAL SEPTUM SURGERY      SHOULDER SURGERY      SINUS SURGERY      TONSILLECTOMY         Family History:  Family History   Problem Relation Age of Onset    Thyroid disease Sister     Allergic rhinitis Mother     Thyroid disease Maternal Aunt     Thyroid disease Maternal Uncle     Diabetes Maternal Grandmother         late in life    Heart disease Maternal Grandmother         Congestive heart failure    Stroke Paternal Grandfather          of stroke    Cancer Paternal Aunt         uterine       Social History:  Social History     Socioeconomic History    Marital status:    Tobacco  Use    Smoking status: Never    Smokeless tobacco: Never   Substance and Sexual Activity    Alcohol use: Yes     Alcohol/week: 2.0 standard drinks of alcohol     Types: 2 Glasses of wine per week     Comment: socially    Drug use: No    Sexual activity: Not Currently     Partners: Male     Birth control/protection: Other-see comments, None     Comment: No longer taking.  In menopause.       OBJECTIVE:    /72   Pulse 80   Temp 97.7 °F (36.5 °C) (Oral)   Resp 18   Wt 67.5 kg (148 lb 13 oz)   SpO2 100%   BMI 25.54 kg/m²     PHYSICAL EXAMINATION:    General appearance: Well appearing, in no acute distress, alert and oriented x3.  Psych:  Mood and affect appropriate.  Skin: Skin color, texture, turgor normal, no rashes or lesions, in both upper and lower body.  Head/face:  Atraumatic, normocephalic.  Cor: RRR  Pulm: Symmetric chest rise, no respiratory distress noted.   Back: Straight leg raising in the sitting position is negative to radicular pain. There is pain with palpation over right lower lumbar facet joints. Full ROM with pain on extension. Positive facet loading on the right.    Extremities: No deformities, edema, or skin discoloration. Good capillary refill.  Musculoskeletal: There is mild pain with palpation over right SI joint. There is pain with palpation over coccyx.  Provocative maneuvers for SI joint positive (patricks, compression, Gaenslens). Jose positive on the right. Bilateral lower extremity strength is normal and symmetric.  No atrophy or tone abnormalities are noted.  Neuro: No loss of sensation is noted. Straight leg negative.   Gait: Normal.    ASSESSMENT: 53 y.o. year old female with low back pain, consistent with the followin. Coccydynia        2. Sacroiliitis        3. Osteoarthritis of lumbar spine, unspecified spinal osteoarthritis complication status        4. Spondylosis of lumbar region without myelopathy or radiculopathy        5. DDD (degenerative disc disease),  6 lumbar        6. Spondylosis without myelopathy                PLAN:   We discussed with the patient the assessment and recommendations. The following is the plan we agreed on:   - Previous imaging was reviewed and discussed with the patient today.    - Schedule for ganglion impar block     - I have stressed the importance of physical activity and a home exercise plan to help with pain and improve health. She recently completed physical therapy.     - Patient can continue with medications for now since they are providing benefits, using them appropriately, and without side effects.     - RTC 2 weeks after above procedure.     The above plan and management options were discussed at length with patient. Patient is in agreement with the above and verbalized understanding.    SANTOSH Blas  Interventional Pain Management    10/18/2023

## 2023-10-19 DIAGNOSIS — M46.1 SACROILIITIS: ICD-10-CM

## 2023-10-19 DIAGNOSIS — M47.816 OSTEOARTHRITIS OF LUMBAR SPINE, UNSPECIFIED SPINAL OSTEOARTHRITIS COMPLICATION STATUS: Primary | ICD-10-CM

## 2023-10-23 ENCOUNTER — PATIENT MESSAGE (OUTPATIENT)
Dept: PAIN MEDICINE | Facility: OTHER | Age: 53
End: 2023-10-23
Payer: COMMERCIAL

## 2023-11-06 ENCOUNTER — PATIENT MESSAGE (OUTPATIENT)
Dept: ADMINISTRATIVE | Facility: OTHER | Age: 53
End: 2023-11-06
Payer: COMMERCIAL

## 2023-11-07 ENCOUNTER — PATIENT MESSAGE (OUTPATIENT)
Dept: ADMINISTRATIVE | Facility: OTHER | Age: 53
End: 2023-11-07
Payer: COMMERCIAL

## 2023-11-08 ENCOUNTER — PATIENT MESSAGE (OUTPATIENT)
Dept: ADMINISTRATIVE | Facility: OTHER | Age: 53
End: 2023-11-08
Payer: COMMERCIAL

## 2023-11-13 ENCOUNTER — HOSPITAL ENCOUNTER (OUTPATIENT)
Facility: OTHER | Age: 53
Discharge: HOME | End: 2023-11-13
Attending: ANESTHESIOLOGY | Admitting: ANESTHESIOLOGY
Payer: COMMERCIAL

## 2023-11-13 VITALS
RESPIRATION RATE: 16 BRPM | WEIGHT: 146 LBS | HEIGHT: 64 IN | BODY MASS INDEX: 24.92 KG/M2 | DIASTOLIC BLOOD PRESSURE: 70 MMHG | SYSTOLIC BLOOD PRESSURE: 108 MMHG | TEMPERATURE: 98 F | OXYGEN SATURATION: 98 % | HEART RATE: 70 BPM

## 2023-11-13 DIAGNOSIS — M53.3 COCCYDYNIA: Primary | ICD-10-CM

## 2023-11-13 DIAGNOSIS — G89.29 CHRONIC PAIN: ICD-10-CM

## 2023-11-13 PROCEDURE — 64999 UNLISTED PX NERVOUS SYSTEM: CPT | Performed by: ANESTHESIOLOGY

## 2023-11-13 PROCEDURE — 63600175 PHARM REV CODE 636 W HCPCS: Performed by: ANESTHESIOLOGY

## 2023-11-13 PROCEDURE — 25000003 PHARM REV CODE 250: Performed by: ANESTHESIOLOGY

## 2023-11-13 PROCEDURE — 25000003 PHARM REV CODE 250: Performed by: STUDENT IN AN ORGANIZED HEALTH CARE EDUCATION/TRAINING PROGRAM

## 2023-11-13 PROCEDURE — 64999 UNLISTED PX NERVOUS SYSTEM: CPT | Mod: ,,, | Performed by: ANESTHESIOLOGY

## 2023-11-13 PROCEDURE — 25500020 PHARM REV CODE 255: Performed by: ANESTHESIOLOGY

## 2023-11-13 PROCEDURE — 64999 PR GANGLION IMPAR INJECTION: ICD-10-PCS | Mod: ,,, | Performed by: ANESTHESIOLOGY

## 2023-11-13 RX ORDER — MIDAZOLAM HYDROCHLORIDE 2 MG/2ML
INJECTION, SOLUTION INTRAMUSCULAR; INTRAVENOUS
Status: DISCONTINUED | OUTPATIENT
Start: 2023-11-13 | End: 2023-11-13 | Stop reason: HOSPADM

## 2023-11-13 RX ORDER — LIDOCAINE HYDROCHLORIDE 20 MG/ML
INJECTION, SOLUTION INFILTRATION; PERINEURAL
Status: DISCONTINUED | OUTPATIENT
Start: 2023-11-13 | End: 2023-11-13 | Stop reason: HOSPADM

## 2023-11-13 RX ORDER — SODIUM CHLORIDE 9 MG/ML
INJECTION, SOLUTION INTRAVENOUS CONTINUOUS
Status: DISCONTINUED | OUTPATIENT
Start: 2023-11-13 | End: 2023-11-13 | Stop reason: HOSPADM

## 2023-11-13 RX ORDER — BUPIVACAINE HYDROCHLORIDE 2.5 MG/ML
INJECTION, SOLUTION EPIDURAL; INFILTRATION; INTRACAUDAL
Status: DISCONTINUED | OUTPATIENT
Start: 2023-11-13 | End: 2023-11-13 | Stop reason: HOSPADM

## 2023-11-13 RX ORDER — FENTANYL CITRATE 50 UG/ML
INJECTION, SOLUTION INTRAMUSCULAR; INTRAVENOUS
Status: DISCONTINUED | OUTPATIENT
Start: 2023-11-13 | End: 2023-11-13 | Stop reason: HOSPADM

## 2023-11-13 NOTE — DISCHARGE INSTRUCTIONS

## 2023-11-13 NOTE — DISCHARGE SUMMARY
Discharge Note  Short Stay      SUMMARY     Admit Date: 11/13/2023    Attending Physician: Andra Reich MD      Discharge Physician: Erick Valenzuela      Discharge Date: 11/13/2023 2:04 PM    Procedure(s) (LRB):  BLOCK, GANGLION IMPAR (N/A)    Final Diagnosis: Osteoarthritis of lumbar spine, unspecified spinal osteoarthritis complication status [M47.816]  Sacroiliitis [M46.1]    Disposition: Home or self care    Patient Instructions:   Current Discharge Medication List        CONTINUE these medications which have NOT CHANGED    Details   baclofen (LIORESAL) 10 MG tablet TAKE 1 TABLET BY MOUTH EVERY 8 HOURS AS NEEDED FOR MUSCLE SPASMS  Qty: 270 tablet, Refills: 0    Associated Diagnoses: Migraine without aura, intractable, without status migrainosus      cholecalciferol, vitamin D3, 125 mcg (5,000 unit) Tab Take by mouth.      galcanezumab-gnlm 120 mg/mL PnIj Inject 120 mg into the skin every 28 days. Begin 28 days after loading dose.  Qty: 1 mL, Refills: 10    Associated Diagnoses: Chronic migraine without aura without status migrainosus, not intractable; Migraine without aura, intractable, without status migrainosus      rizatriptan (MAXALT-MLT) 10 MG disintegrating tablet Take 1 tablet (10 mg total) by mouth every 2 (two) hours as needed for Migraine. Max 30 mg/day.  Qty: 12 tablet, Refills: 5    Associated Diagnoses: Intractable migraine without aura and without status migrainosus      topiramate (TOPAMAX) 50 MG tablet Take 1 tablet (50 mg total) by mouth 2 (two) times daily.  Qty: 180 tablet, Refills: 1    Associated Diagnoses: Migraine without aura and without status migrainosus, not intractable                 Discharge Diagnosis: Osteoarthritis of lumbar spine, unspecified spinal osteoarthritis complication status [M47.816]  Sacroiliitis [M46.1]  Condition on Discharge: Stable with no complications to procedure   Diet on Discharge: Same as before.  Activity: as per instruction sheet.  Discharge to: Home with a  responsible adult.  Follow up: 2-4 weeks       Please call my office or pager at [phone] if experienced any weakness or loss of sensation, fever > 101.5, pain uncontrolled with oral medications, persistent nausea/vomiting/or diarrhea, redness or drainage from the incisions, or any other worrisome concerns. If physician on call was not reached or could not communicate with our office for any reason please go to the nearest emergency department

## 2023-11-13 NOTE — OP NOTE
Diagnostic/Therapeutic Ganglion Impar Block under Fluoroscopic Guidance    The procedure, risks, benefits, and options were discussed with the patient. There are no contraindications to the procedure. The patent expressed understanding and agreed to the procedure. Informed written consent was obtained prior to the start of the procedure and can be found in the patient's chart.    PATIENT NAME: Chaya Dee   MRN: [id number]     DATE OF PROCEDURE: 11/13/2023    PROCEDURE: Diagnostic/Therapeutic Ganglion Impar Block under Fluoroscopic Guidance    PRE-OP DIAGNOSIS: Osteoarthritis of lumbar spine, unspecified spinal osteoarthritis complication status [M47.816]  Sacroiliitis [M46.1]    POST-OP DIAGNOSIS: Same    PHYSICIAN: Andra Reich MD    ASSISTANTS: Erick Valenzuela M.D. ([facility] Fellow)     MEDICATIONS INJECTED: Preservative-free Decadron 10mg with 4cc of Bupivacaine 0.25%     LOCAL ANESTHETIC INJECTED: Xylocaine 2%     SEDATION: Versed 2mg and Fentanyl 25mcg                                                                                                                                                                                     Conscious sedation ordered by M.D. Patient re-evaluation prior to administration of conscious sedation. No changes noted in patient's status from initial evaluation. The patient's vital signs were monitored by RN and patient remained hemodynamically stable throughout the procedure.    Event Time In   Sedation Start 1358   Sedation End 1404       ESTIMATED BLOOD LOSS: None    COMPLICATIONS: None    TECHNIQUE: Time-out was performed to identify the patient and procedure to be performed. With the patient laying in a prone position, the surgical area was prepped and draped in the usual sterile fashion using ChloraPrep and a fenestrated drape. The area overlying the coccyx was identified using anatomical landmarks and fluoroscopy in the lateral view. Skin anesthesia was achieved by  injecting Lidocaine 2% over the injection site. A 25 gauge, 3.5 inch spinal quinke needle was then advanced until the coccygeal ligament was encountered and entered. Using fluoroscopy in the lateral view, the needle was advanced through the coccygeal ligament. After negative aspiration and no paresthesias, contrast dye Omnipaque (300mg/mL) was injected to confirm placement and there was no vascular runoff. 8 mL of the medication mixture listed above was injected slowly. The needles were removed and bleeding was nil. A sterile dressing was applied. No specimens collected. The patient tolerated the procedure well.     PRE-PROCEDURE PAIN SCORE: 8/10    POST-PROCEDURE PAIN SCORE: 0/10    The patient was monitored after the procedure in the recovery area. They were given post-procedure and discharge instructions to follow at home. The patient was discharged in a stable condition.      Erick Valenzuela MD     I reviewed and edited the fellow's note. I conducted my own interview and physical examination. I agree with the findings. I was present and supervising all critical portions of the procedure.

## 2023-11-21 ENCOUNTER — PATIENT MESSAGE (OUTPATIENT)
Dept: ADMINISTRATIVE | Facility: OTHER | Age: 53
End: 2023-11-21
Payer: COMMERCIAL

## 2023-12-21 DIAGNOSIS — G43.019 MIGRAINE WITHOUT AURA, INTRACTABLE, WITHOUT STATUS MIGRAINOSUS: ICD-10-CM

## 2023-12-21 RX ORDER — BACLOFEN 10 MG/1
10 TABLET ORAL EVERY 8 HOURS PRN
Qty: 270 TABLET | Refills: 0 | Status: SHIPPED | OUTPATIENT
Start: 2023-12-21 | End: 2024-03-06 | Stop reason: SDUPTHER

## 2023-12-29 NOTE — DISCHARGE SUMMARY
"      Patient ID: Wendi He  YOB: 1973  MRN: [id number]    Chief Complaint: Pain of the Left Knee and Pain of the Right Knee      History of Present Illness: Wendi He is a right-hand dominant 50 y.o. female who presents today with bilateral knee pain.  Patient reports that her knees began to give her problems on December 16 after doing a lot of dancing.  She states that it is painful to bend her knees in the posterior area of her knees.  She reports an achy, throbbing, sharp pain in the posterior area of both knees.  She has been soaking in epsom salt baths to help with her pain.  She has noticed swelling in her knees. Patient was last seen in clinic on 7/14/2023 where her right knee was aspirated and sent for labs.  She was placed on antibiotics for an infection in the knee.  She received a CSI to the left knee on 6/30/2023 and a CSI to the right knee on 6/26.      7/14/2023 Interval History of Present Illness: Wendi He is a right-hand dominant 50 y.o. female who presents today with left knee pain.  Patient was last seen in clinic on June 26, 2023 for bilateral knee pain and received an injection in the right knee. Patient is here today with complaints of right knee pain and swelling for 1 week, she wears a brace on the right knee and thought that brace was causing swelling. She states that it feels like she has to "pop" her knee. Pain 5/10.    6/30/2023 Interval History of Present Illness: Wendi He is a right-hand dominant 50 y.o. female who presents today with left knee pain.  Patient was last seen in clinic on June 26, 2023 for bilateral knee pain and received an injection in the right knee.  She reports that the right knee is feeling no pain at this time, but the left knee is a 7/10.  She is asking if she might receive a CSI to the left knee as she is impressed with the results from the injection she received in the right knee.  She reports that the pain in the left knee " Discharge Note  Short Stay      SUMMARY     Admit Date: 10/5/2023    Attending Physician: Andra Reich      Discharge Physician: Andra Reich      Discharge Date: 10/5/2023 9:08 AM    Procedure(s) (LRB):  INJECTION,SACROILIAC JOINT, RIGHT  SOONER DATE (Right)    Final Diagnosis: Sacroiliac dysfunction [M53.3]    Disposition: Home or self care    Patient Instructions:   Current Discharge Medication List        CONTINUE these medications which have NOT CHANGED    Details   baclofen (LIORESAL) 10 MG tablet TAKE 1 TABLET BY MOUTH EVERY 8 HOURS AS NEEDED FOR MUSCLE SPASMS  Qty: 270 tablet, Refills: 0    Associated Diagnoses: Migraine without aura, intractable, without status migrainosus      cholecalciferol, vitamin D3, 125 mcg (5,000 unit) Tab Take by mouth.      cyanocobalamin 1,000 mcg/mL injection every 14 (fourteen) days.   Refills: 0      gabapentin (NEURONTIN) 300 MG capsule Take 2 capsules (600 mg total) by mouth every evening.  Qty: 60 capsule, Refills: 11    Associated Diagnoses: Lumbar radiculopathy; Cervical radiculopathy      galcanezumab-gnlm 120 mg/mL PnIj Inject 120 mg into the skin every 28 days. Begin 28 days after loading dose.  Qty: 1 mL, Refills: 10    Associated Diagnoses: Chronic migraine without aura without status migrainosus, not intractable; Migraine without aura, intractable, without status migrainosus      rizatriptan (MAXALT-MLT) 10 MG disintegrating tablet Take 1 tablet (10 mg total) by mouth every 2 (two) hours as needed for Migraine. Max 30 mg/day.  Qty: 12 tablet, Refills: 5    Associated Diagnoses: Intractable migraine without aura and without status migrainosus      topiramate (TOPAMAX) 50 MG tablet Take 1 tablet (50 mg total) by mouth 2 (two) times daily.  Qty: 180 tablet, Refills: 1    Associated Diagnoses: Migraine without aura and without status migrainosus, not intractable                 Discharge Diagnosis: Sacroiliac dysfunction [M53.3]  Condition on Discharge: Stable with no  complications to procedure   Diet on Discharge: Same as before.  Activity: as per instruction sheet.  Discharge to: Home with a responsible adult.  Follow up: 2-4 weeks       Please call my office or pager at [phone] if experienced any weakness or loss of sensation, fever > 101.5, pain uncontrolled with oral medications, persistent nausea/vomiting/or diarrhea, redness or drainage from the incisions, or any other worrisome concerns. If physician on call was not reached or could not communicate with our office for any reason please go to the nearest emergency department       is in the anterior lateral aspect of the knee and is constant in nature.      2023 Interval History of Present Illness: Wendi He is a right-hand dominant 50 y.o. female who presents today with bilateral knee pain.  Reports knee pain started  ago after a fall landing on knees. Pain started 2 days later. L >R stiff/throbbing pain across joint line and posterior knee. Has tried soaking, heating pad, walking have helped. Dr. Jennings ordered xrays,   prn hinge brace, gabapentin, tylenol, ice therapy and referred her to Physical therapy. She has completed PT.     The patient is active in none.  Occupation: [employer]      Past Medical History:   Past Medical History:   Diagnosis Date    Anemia     Anxiety     Depression     Diabetes mellitus     Gastroesophageal reflux disease 2022    Hypertension     Peripheral neuropathy 2016     Past Surgical History:   Procedure Laterality Date    BREAST SURGERY      HYSTERECTOMY      OOPHORECTOMY       Family History   Problem Relation Age of Onset    Ovarian cancer Mother     Heart disease Father      Social History     Socioeconomic History    Marital status:    Tobacco Use    Smoking status: Former     Current packs/day: 0.00     Types: Cigarettes     Quit date: 2023     Years since quittin.5     Passive exposure: Past    Smokeless tobacco: Never   Substance and Sexual Activity    Alcohol use: No    Drug use: Never    Sexual activity: Yes     Partners: Male     Medication List with Changes/Refills   Current Medications    ASPIRIN 81 MG CHEW    Take 81 mg by mouth once daily.    CETIRIZINE (ZYRTEC) 10 MG TABLET    Take 1 tablet (10 mg total) by mouth every evening.    DICLOFENAC SODIUM (VOLTAREN) 1 % GEL    Apply 2 g topically 2 (two) times daily as needed (for arthritis pain).    FERROUS SULFATE (IRON ORAL)    Take by mouth.    GABAPENTIN (NEURONTIN) 600 MG TABLET    Take 1 tablet (600 mg total) by mouth 3 (three) times daily.     LEVOCETIRIZINE (XYZAL) 5 MG TABLET    Take 1 tablet (5 mg total) by mouth every evening.    LINACLOTIDE (LINZESS) 72 MCG CAP CAPSULE    Take 1 capsule (72 mcg total) by mouth before breakfast.    LOSARTAN (COZAAR) 50 MG TABLET    Take 1 tablet (50 mg total) by mouth once daily.    MECLIZINE (ANTIVERT) 25 MG TABLET    Take 1 tablet (25 mg total) by mouth 2 (two) times daily as needed for Dizziness or Nausea.    MONTELUKAST (SINGULAIR) 10 MG TABLET    Take 1 tablet (10 mg total) by mouth every evening.    NICOTINE, POLACRILEX, (NICORETTE) 2 MG GUM    Chew 1 to 2 pieces by mouth per hour in place of a cigarette for breakthrough cravings.    ONDANSETRON (ZOFRAN-ODT) 4 MG TBDL    Dissolve 1 tablet (4 mg total) by mouth every 8 (eight) hours as needed (nausea/vomiting).    PANTOPRAZOLE (PROTONIX) 40 MG TABLET    Take 1 tablet (40 mg total) by mouth once daily.    ROSUVASTATIN (CRESTOR) 5 MG TABLET    Take 1 tablet (5 mg total) by mouth once daily.    TIRZEPATIDE 10 MG/0.5 ML PNIJ    Inject 10 mg into the skin every 7 days.    TRIAMCINOLONE ACETONIDE 0.1% (KENALOG) 0.1 % CREAM    Apply topically to affected area(s) 2 (two) times daily.    VARENICLINE (CHANTIX) 1 MG TAB    Take 1 tablet (1 mg total) by mouth 2 (two) times daily.    VENLAFAXINE (EFFEXOR-XR) 150 MG CP24    Take 1 capsule (150 mg total) by mouth once daily.    XOLAIR 150 MG/ML INJECTION    Inject 2 mLs (300 mg total) into the skin every 28 days.     Review of patient's allergies indicates:   Allergen Reactions    Celecoxib      Other reaction(s): Itching  Other reaction(s): Hives  Other reaction(s): rash,, itching    Latex      Other reaction(s): Hives    Lisinopril Other (See Comments)     cough  Other reaction(s): coughing       Physical Exam:   Body mass index is 36.11 kg/m².    GENERAL: Well appearing, in no acute distress.  HEAD: Normocephalic and atraumatic.  ENT: External ears and nose grossly normal.  EYES: EOMI bilaterally  PULMONARY: Respirations  are grossly even and non-labored.  NEURO: Awake, alert, and oriented x 3.  SKIN: No obvious rashes appreciated.  PSYCH: Mood & affect are appropriate.    Detailed MSK exam:     Left knee exam:   -ROM: extension 0, flexion 120  -TTP: Medial joint line and Popliteal fossa  -effusion: moderate  -Patellar apprehension negative  -Elvira test negative  -stable to varus and valgus stress tests  -Lachman test negative, anterior drawer test negative, posterior drawer test negative    Right knee exam:   -ROM: extension 0, flexion 120  -TTP: Lateral joint line and Popliteal fossa  -effusion: moderate  -Patellar apprehension negative  -Elvira test negative  -stable to varus and valgus stress tests  -Lachman test negative, anterior drawer test negative, posterior drawer test negative      Imaging:  X-Ray Sternoclavicular Joints Min 3 View  Narrative: EXAMINATION:  Two views of the clavicles    CLINICAL HISTORY:  Left-sided pain and swelling    COMPARISON:  None    FINDINGS:  No displaced fracture or dislocation.  No aggressive osseous process.  Impression: No abnormal osseous findings    Electronically signed by: Brian Florez MD  Date:    07/24/2023  Time:    07:56        Relevant imaging results were reviewed and interpreted by me and per my read shows mild arthritic changes bilaterally with medial compartment joint space narrowing and minimal osteophytes bilaterally.  This was discussed with the patient and / or family today.     Assessment:  Wendi He is a 50 y.o. female following up for bilateral knee pain.   Plan: Steroid injection given today (see separate procedure note for details). We discussed the proper protocols after the injection such as no submerging pools, baths tubs, or hot tubs for 24 hr.  Showering is okay today.  We also discussed that blood sugars can be elevated after an injection and asked patient to properly checked her sugars over the next few days and contact their PCP if there are any  concerns.  We discussed red flags such as fevers, chills, red, warm, tender joint at the area of injection to please seek medical care immediately.   Consider advanced imaging if not improving. Consider gel injection if not improving. Continue conservative management for pain.   Follow up as needed. All questions answered.     Primary osteoarthritis of both knees  -     Sports Medicine US - Guidance for Needle Placement  -     Large Joint Aspiration/Injection: bilateral supra patellar bursa    Knee effusion, left  -     Sports Medicine US - Guidance for Needle Placement  -     Large Joint Aspiration/Injection: bilateral supra patellar bursa    Knee effusion, right  -     Sports Medicine US - Guidance for Needle Placement  -     Large Joint Aspiration/Injection: bilateral supra patellar bursa         Ultrasound guidance was used for needle localization. Images were saved and stored for documentation. The appropriate structures were visualized. Dynamic visualization of the needle was continuous throughout the procedures and maintained good position.      MEDICAL NECESSITY FOR VISCOSUPPLEMENTATION: After thorough evaluation of the patient, I have determined that visco-supplementation is medically necessary. The patient has painful degenerative changes of the knee with failure of conservative treatments including lifestyle modifications and rehabilitation exercises.  Oral analgesis/NSAIDs have not adequately controlled symptoms and there is radiographic evidence of Kellgren Luis grade 2 or greater osteoarthritic changes, or in lack of radiographic evidence, there is arthroscopic or other evidence of chondrosis.       Electronically signed:  Mathew Pablo MD, MPH  12/29/2023  9:41 AM

## 2024-01-02 ENCOUNTER — PATIENT MESSAGE (OUTPATIENT)
Dept: INTERNAL MEDICINE | Facility: CLINIC | Age: 54
End: 2024-01-02
Payer: COMMERCIAL

## 2024-01-02 DIAGNOSIS — G43.709 CHRONIC MIGRAINE WITHOUT AURA WITHOUT STATUS MIGRAINOSUS, NOT INTRACTABLE: ICD-10-CM

## 2024-01-02 DIAGNOSIS — G43.019 MIGRAINE WITHOUT AURA, INTRACTABLE, WITHOUT STATUS MIGRAINOSUS: ICD-10-CM

## 2024-01-02 NOTE — TELEPHONE ENCOUNTER
Spoke to Pt about her refill and scheduling an appt. Pt will be scheduled for March 6. I let Pt know that Jazz is out on maternity leave and her refill request will be sent to Sue who is covering her clinic.     ----- Message from Alicia Brandon sent at 1/2/2024  2:14 PM CST -----  Regarding: Engality Injections  Contact: Pt @ [phone]  Pt is calling to get Engality Injection refilled and get appt. Asking for a call back

## 2024-01-24 ENCOUNTER — OFFICE VISIT (OUTPATIENT)
Dept: INTERNAL MEDICINE | Facility: CLINIC | Age: 54
End: 2024-01-24
Payer: COMMERCIAL

## 2024-01-24 ENCOUNTER — IMMUNIZATION (OUTPATIENT)
Dept: INTERNAL MEDICINE | Facility: CLINIC | Age: 54
End: 2024-01-24
Payer: COMMERCIAL

## 2024-01-24 VITALS
OXYGEN SATURATION: 99 % | DIASTOLIC BLOOD PRESSURE: 86 MMHG | WEIGHT: 147.5 LBS | BODY MASS INDEX: 25.18 KG/M2 | HEIGHT: 64 IN | SYSTOLIC BLOOD PRESSURE: 120 MMHG | HEART RATE: 82 BPM

## 2024-01-24 DIAGNOSIS — G47.00 INSOMNIA, UNSPECIFIED TYPE: ICD-10-CM

## 2024-01-24 DIAGNOSIS — Z00.00 ANNUAL PHYSICAL EXAM: Primary | ICD-10-CM

## 2024-01-24 DIAGNOSIS — Z23 NEED FOR VACCINATION: Primary | ICD-10-CM

## 2024-01-24 DIAGNOSIS — R10.2 ACUTE PELVIC PAIN, FEMALE: ICD-10-CM

## 2024-01-24 DIAGNOSIS — R19.7 DIARRHEA, UNSPECIFIED TYPE: ICD-10-CM

## 2024-01-24 DIAGNOSIS — K76.89 HEPATIC CYST: ICD-10-CM

## 2024-01-24 DIAGNOSIS — M06.9 RHEUMATOID ARTHRITIS, INVOLVING UNSPECIFIED SITE, UNSPECIFIED WHETHER RHEUMATOID FACTOR PRESENT: ICD-10-CM

## 2024-01-24 DIAGNOSIS — G44.86 CERVICOGENIC HEADACHE: ICD-10-CM

## 2024-01-24 DIAGNOSIS — M46.1 SACROILIITIS: ICD-10-CM

## 2024-01-24 DIAGNOSIS — G43.009 MIGRAINE WITHOUT AURA AND WITHOUT STATUS MIGRAINOSUS, NOT INTRACTABLE: ICD-10-CM

## 2024-01-24 PROCEDURE — 90480 ADMN SARSCOV2 VAC 1/ONLY CMP: CPT | Mod: S$GLB,,, | Performed by: INTERNAL MEDICINE

## 2024-01-24 PROCEDURE — 99999 PR PBB SHADOW E&M-EST. PATIENT-LVL IV: CPT | Mod: PBBFAC,,, | Performed by: NURSE PRACTITIONER

## 2024-01-24 PROCEDURE — 99396 PREV VISIT EST AGE 40-64: CPT | Mod: S$GLB,,, | Performed by: NURSE PRACTITIONER

## 2024-01-24 PROCEDURE — 91322 SARSCOV2 VAC 50 MCG/0.5ML IM: CPT | Mod: S$GLB,,, | Performed by: INTERNAL MEDICINE

## 2024-01-24 RX ORDER — CELECOXIB 200 MG/1
200 CAPSULE ORAL DAILY
COMMUNITY
Start: 2023-12-20

## 2024-01-24 RX ORDER — MELOXICAM 15 MG/1
15 TABLET ORAL DAILY
COMMUNITY
Start: 2023-11-27

## 2024-01-24 RX ORDER — PREDNISONE 10 MG/1
10 TABLET ORAL DAILY
COMMUNITY
Start: 2024-01-18 | End: 2024-03-06

## 2024-01-24 RX ORDER — HYDROXYZINE HYDROCHLORIDE 25 MG/1
25 TABLET, FILM COATED ORAL NIGHTLY PRN
Qty: 30 TABLET | Refills: 3 | Status: SHIPPED | OUTPATIENT
Start: 2024-01-24 | End: 2024-04-22

## 2024-01-24 RX ORDER — FLUTICASONE PROPIONATE 50 UG/1
1 SPRAY, METERED NASAL
COMMUNITY
Start: 2024-01-18

## 2024-01-24 RX ORDER — CEFDINIR 300 MG/1
300 CAPSULE ORAL DAILY
COMMUNITY
Start: 2024-01-18 | End: 2024-03-06

## 2024-01-24 NOTE — PROGRESS NOTES
Internal Medicine Annual Exam       CHIEF COMPLAINT     The patient, Chaya Dee, who is a 53 y.o. female with OA of the lumbar spine, spondylosis, DDD of the lumbar spine, presents for an annual exam.    HPI   Having sinus pain and pressure x 1 month - treated with bendaryl and netti pot. Seen by ENT - treated with antibiotic and prednisone     Trouble sleeping- started with menopause. Trial of Ambien was ineffective. Also treated with magnesium and vitamins     Diarrhea- x 3 weeks   No abd pain   No blood in stool   +dark tarry stool     DDD of the lumbar spine, sacroiliac dysfunction - had trigger point injection with Dr Reich, not effective. S/p ganglion impar block.     Migraines-  emgality 120 mg SQ monthly, topiramate 50 mg bid   for acute migraines - maxalt 10 mg mlt   Followed by neurology     RA- followed by rheumatology. Weaned off plaquenil 5/2022  Weekly B12 injections     HM-  PAP- 5/29/2023  CRCS- had c-scope 6 years ago (2016)   MMG-5/2023  Flu-  Covid-      Past Medical History:  Past Medical History:   Diagnosis Date    Allergy     Chronic allergic rhinitis 3/19/2016    Endometriosis     Laryngopharyngeal reflux disease 03/2018    PONV (postoperative nausea and vomiting)     Recurrent upper respiratory infection (URI)        Past Surgical History:   Procedure Laterality Date    ABDOMINAL SURGERY      endometrosis    ADENOIDECTOMY      INJECTION OF ANESTHETIC AGENT AROUND NERVE Bilateral 8/28/2023    Procedure: BLOCK, NERVE, BILATERAL L3-L4-L5 MEDIAL BRANCH SOONER DATE;  Surgeon: Andra Reich MD;  Location: [facility];  Service: Pain Management;  Laterality: Bilateral;    INJECTION OF ANESTHETIC AGENT AROUND NERVE N/A 11/13/2023    Procedure: BLOCK, GANGLION IMPAR;  Surgeon: Andra Reich MD;  Location: [facility];  Service: Pain Management;  Laterality: N/A;    INJECTION OF FACET JOINT Right 5/11/2023    Procedure: INJECTION, FACET JOINT RIGHT L5/S1;  Surgeon: Andra Reich MD;   Location: [facility];  Service: Pain Management;  Laterality: Right;    INJECTION, SACROILIAC JOINT Right 10/5/2023    Procedure: INJECTION,SACROILIAC JOINT, RIGHT  SOONER DATE;  Surgeon: Andra Reich MD;  Location: [facility];  Service: Pain Management;  Laterality: Right;    NASAL SEPTUM SURGERY      SHOULDER SURGERY      SINUS SURGERY      TONSILLECTOMY          Family History   Problem Relation Age of Onset    Thyroid disease Sister     Allergic rhinitis Mother     Thyroid disease Maternal Aunt     Thyroid disease Maternal Uncle     Diabetes Maternal Grandmother         late in life    Heart disease Maternal Grandmother         Congestive heart failure    Stroke Paternal Grandfather          of stroke    Cancer Paternal Aunt         uterine        Social History     Socioeconomic History    Marital status:    Tobacco Use    Smoking status: Never    Smokeless tobacco: Never   Substance and Sexual Activity    Alcohol use: Yes     Alcohol/week: 2.0 standard drinks of alcohol     Types: 2 Glasses of wine per week     Comment: socially    Drug use: No    Sexual activity: Not Currently     Partners: Male     Birth control/protection: Other-see comments, None     Comment: No longer taking.  In menopause.     Social Determinants of Health     Financial Resource Strain: Low Risk  (2024)    Overall Financial Resource Strain (CARDIA)     Difficulty of Paying Living Expenses: Not hard at all   Food Insecurity: No Food Insecurity (2024)    Hunger Vital Sign     Worried About Running Out of Food in the Last Year: Never true     Ran Out of Food in the Last Year: Never true   Transportation Needs: No Transportation Needs (2024)    PRAPARE - Transportation     Lack of Transportation (Medical): No     Lack of Transportation (Non-Medical): No   Physical Activity: Insufficiently Active (2024)    Exercise Vital Sign     Days of Exercise per Week: 2 days     Minutes of Exercise per Session: 60  min   Stress: Stress Concern Present (1/23/2024)    [employer] - Occupational Stress Questionnaire     Feeling of Stress : To some extent   Social Connections: Unknown (1/23/2024)    Social Connection and Isolation Panel [NHANES]     Frequency of Communication with Friends and Family: More than three times a week     Frequency of Social Gatherings with Friends and Family: Once a week     Active Member of Clubs or Organizations: No     Attends Club or Organization Meetings: Never     Marital Status:    Housing Stability: Low Risk  (1/23/2024)    Housing Stability Vital Sign     Unable to Pay for Housing in the Last Year: No     Number of Places Lived in the Last Year: 1     Unstable Housing in the Last Year: No        Social History     Tobacco Use   Smoking Status Never   Smokeless Tobacco Never        Allergies as of 01/24/2024    (No Known Allergies)          Home Medications:  Prior to Admission medications    Medication Sig Start Date End Date Taking? Authorizing Provider   baclofen (LIORESAL) 10 MG tablet TAKE 1 TABLET BY MOUTH EVERY 8 HOURS AS NEEDED FOR MUSCLE SPASM 12/21/23   Sue Jane PA-C   cholecalciferol, vitamin D3, 125 mcg (5,000 unit) Tab Take by mouth.    Provider, Historical   galcanezumab-gnlm 120 mg/mL PnIj Inject 1 mL (120 mg total) into the skin every 28 days. Begin 28 days after loading dose. 1/3/24 4/2/24  Sue Jane PA-C   rizatriptan (MAXALT-MLT) 10 MG disintegrating tablet Take 1 tablet (10 mg total) by mouth every 2 (two) hours as needed for Migraine. Max 30 mg/day. 3/30/22   Tess Haas FNP   topiramate (TOPAMAX) 50 MG tablet Take 1 tablet (50 mg total) by mouth 2 (two) times daily. 6/26/23   Tess Haas FNP       Review of Systems:  Review of Systems   Constitutional:  Negative for activity change and unexpected weight change.   HENT:  Positive for rhinorrhea and sinus pressure. Negative for hearing loss and trouble  swallowing.    Eyes:  Negative for discharge and visual disturbance.   Respiratory:  Negative for chest tightness and wheezing.    Cardiovascular:  Negative for chest pain and palpitations.   Gastrointestinal:  Positive for diarrhea. Negative for blood in stool, constipation and vomiting.   Endocrine: Negative for polydipsia and polyuria.   Genitourinary:  Negative for difficulty urinating, dysuria, hematuria and menstrual problem.   Musculoskeletal:  Positive for neck pain. Negative for arthralgias and joint swelling.   Neurological:  Positive for headaches. Negative for weakness.   Psychiatric/Behavioral:  Positive for sleep disturbance. Negative for confusion and dysphoric mood.        Health Maintainence:   Immunizations:  Health Maintenance         Date Due Completion Date    Cervical Cancer Screening Never done ---    HIV Screening Never done ---    TETANUS VACCINE Never done ---    Hemoglobin A1c (Diabetic Prevention Screening) Never done ---    Colorectal Cancer Screening Never done ---    Shingles Vaccine (1 of 2) Never done ---    Lipid Panel 04/03/2021 4/3/2016    Influenza Vaccine (1) 09/01/2023 9/30/2022    COVID-19 Vaccine (6 - 2023-24 season) 03/20/2024 1/24/2024    Mammogram 05/16/2024 5/16/2023    Override on 2/19/2016: Done             PHYSICAL EXAM     There were no vitals taken for this visit. There is no height or weight on file to calculate BMI.    Physical Exam  Vitals reviewed.   Constitutional:       Appearance: She is well-developed.   HENT:      Head: Normocephalic.      Right Ear: External ear normal.      Left Ear: External ear normal.      Nose: Nose normal.      Mouth/Throat:      Pharynx: No oropharyngeal exudate.   Eyes:      Pupils: Pupils are equal, round, and reactive to light.   Neck:      Thyroid: No thyromegaly.      Vascular: No JVD.      Trachea: No tracheal deviation.   Cardiovascular:      Rate and Rhythm: Normal rate and regular rhythm.      Heart sounds: Normal heart  "sounds. No murmur heard.     No friction rub. No gallop.   Pulmonary:      Effort: Pulmonary effort is normal. No respiratory distress.      Breath sounds: Normal breath sounds. No wheezing or rales.   Abdominal:      General: Bowel sounds are normal. There is no distension.      Palpations: Abdomen is soft.      Tenderness: There is no abdominal tenderness.   Musculoskeletal:         General: No tenderness. Normal range of motion.      Cervical back: Neck supple.   Lymphadenopathy:      Cervical: No cervical adenopathy.   Skin:     General: Skin is warm and dry.      Findings: No rash.   Neurological:      Mental Status: She is alert and oriented to person, place, and time.   Psychiatric:         Behavior: Behavior normal.         LABS     No results found for: "LABA1C", "HGBA1C"  CMP  Sodium   Date Value Ref Range Status   09/21/2022 139 136 - 145 mmol/L Final     Potassium   Date Value Ref Range Status   09/21/2022 4.3 3.5 - 5.1 mmol/L Final     Chloride   Date Value Ref Range Status   09/21/2022 106 95 - 110 mmol/L Final     CO2   Date Value Ref Range Status   09/21/2022 26 23 - 29 mmol/L Final     Glucose   Date Value Ref Range Status   09/21/2022 93 70 - 110 mg/dL Final     BUN   Date Value Ref Range Status   09/21/2022 17 6 - 20 mg/dL Final     Creatinine   Date Value Ref Range Status   09/21/2022 0.9 0.5 - 1.4 mg/dL Final     Calcium   Date Value Ref Range Status   09/21/2022 9.7 8.7 - 10.5 mg/dL Final     Total Protein   Date Value Ref Range Status   09/21/2022 6.9 6.0 - 8.4 g/dL Final     Albumin   Date Value Ref Range Status   09/21/2022 4.4 3.5 - 5.2 g/dL Final     Total Bilirubin   Date Value Ref Range Status   09/21/2022 1.3 (H) 0.1 - 1.0 mg/dL Final     Comment:     For infants and newborns, interpretation of results should be based  on gestational age, weight and in agreement with clinical  observations.    Premature Infant recommended reference ranges:  Up to 24 hours.............<8.0 mg/dL  Up to " 48 hours............<12.0 mg/dL  3-5 days..................<15.0 mg/dL  6-29 days.................<15.0 mg/dL       Alkaline Phosphatase   Date Value Ref Range Status   09/21/2022 93 55 - 135 U/L Final     AST   Date Value Ref Range Status   09/21/2022 54 (H) 10 - 40 U/L Final     ALT   Date Value Ref Range Status   09/21/2022 75 (H) 10 - 44 U/L Final     Anion Gap   Date Value Ref Range Status   09/21/2022 7 (L) 8 - 16 mmol/L Final     eGFR if    Date Value Ref Range Status   04/03/2016 >60.0 >60 mL/min/1.73 m^2 Final     eGFR if non    Date Value Ref Range Status   04/03/2016 >60.0 >60 mL/min/1.73 m^2 Final     Comment:     Calculation used to obtain the estimated glomerular filtration  rate (eGFR) is the CKD-EPI equation. Since race is unknown   in our information system, the eGFR values for   -American and Non--American patients are given   for each creatinine result.       Lab Results   Component Value Date    WBC 9.34 04/03/2016    HGB 13.7 04/03/2016    HCT 44.2 04/03/2016    MCV 92 04/03/2016     04/03/2016     Lab Results   Component Value Date    CHOL 218 (H) 04/03/2016    CHOL 158 06/19/2007     Lab Results   Component Value Date    HDL 62 04/03/2016    HDL 52 06/19/2007     Lab Results   Component Value Date    LDLCALC 133.0 04/03/2016    LDLCALC 92.8 06/19/2007     Lab Results   Component Value Date    TRIG 115 04/03/2016    TRIG 66 06/19/2007     Lab Results   Component Value Date    CHOLHDL 28.4 04/03/2016    CHOLHDL 32.9 06/19/2007     Lab Results   Component Value Date    TSH 0.695 09/21/2022       ASSESSMENT/PLAN     Chaya Dee is a 53 y.o. female    Annual physical exam- All age and gender related screenings discussed   -     CBC Auto Differential; Future; Expected date: 01/24/2024  -     Comprehensive Metabolic Panel; Future; Expected date: 01/24/2024  -     Lipid Panel; Future; Expected date: 01/24/2024  -     Hemoglobin A1C; Future;  Expected date: 01/24/2024  -     TSH; Future; Expected date: 01/24/2024  -     Vitamin D; Future; Expected date: 01/24/2024    Migraine without aura and without status migrainosus, not intractable- stable. Will cont current meds and f/u with neurology     Cervicogenic headache- stable. Will cont current meds and f/u with neurology     Rheumatoid arthritis, involving unspecified site, unspecified whether rheumatoid factor present    Sacroiliitis- stable. Will cont to f/u with pain mgmt     Insomnia, unspecified type- new problem, will start hydroxyzine   -     hydrOXYzine HCL (ATARAX) 25 MG tablet; Take 1 tablet (25 mg total) by mouth nightly as needed for Itching.  Dispense: 30 tablet; Refill: 3    Diarrhea, unspecified type- bland diet, will send for stool studies. C-scope UTD. Oral rehydration   -     Occult blood x 1, stool; Future; Expected date: 01/24/2024  -     Rotavirus antigen, stool; Future; Expected date: 01/24/2024  -     Adenovirus Molecular Detection, PCR, Non-Blood Stool; Future; Expected date: 01/24/2024  -     Giardia / Cryptosporidum, EIA; Future; Expected date: 01/24/2024  -     Stool Exam-Ova,Cysts,Parasites; Future; Expected date: 01/24/2024  -     Clostridium difficile EIA; Future; Expected date: 01/24/2024  -     Stool culture; Future; Expected date: 01/24/2024    Hepatic cyst- repeat CMP and monitor       Follow up with PCP     Didi BARRERA, APRN, FNP-c   Department of Internal Medicine - [facility]  3:16 PM

## 2024-01-25 ENCOUNTER — PATIENT OUTREACH (OUTPATIENT)
Dept: ADMINISTRATIVE | Facility: HOSPITAL | Age: 54
End: 2024-01-25
Payer: COMMERCIAL

## 2024-01-25 NOTE — LETTER
AUTHORIZATION FOR RELEASE OF   CONFIDENTIAL INFORMATION    Dear Dr. Ole Nguyen,    We are seeing Chaya Dee, date of birth 1970, in the clinic at [facility]. Didi Grewal NP is the patient's PCP. Chaya Dee has an outstanding lab/procedure at the time we reviewed her chart. In order to help keep her health information updated, she has authorized us to request the following medical record(s):        (  )  MAMMOGRAM                                      (  )  COLONOSCOPY      ( X )  PAP SMEAR                                        (  )  OUTSIDE LAB RESULTS     (  )  DEXA SCAN                                          (  )  EYE EXAM            (  )  FOOT EXAM                                          (  )  ENTIRE RECORD     (  )  OUTSIDE IMMUNIZATIONS                 (  )  _______________         Please fax records to Didi Grewal NP, [phone]     If you have any questions, please contact HETAL Kevin at [phone].           Patient Name: Chaya Dee  : 1970  Patient Phone #: [phone]

## 2024-01-25 NOTE — PROGRESS NOTES
Health Maintenance Due   Topic Date Due    Cervical Cancer Screening  Never done    HIV Screening  Never done    TETANUS VACCINE  Never done    Hemoglobin A1c (Diabetic Prevention Screening)  Never done    Colorectal Cancer Screening  Never done    Pneumococcal Vaccines (Age 0-64) (2 - PCV) 2019    Shingles Vaccine (1 of 2) Never done    Lipid Panel  2021    Influenza Vaccine (1) 2023     Population Health Chart Review & Patient Outreach Details    Updates Requested / Reviewed:     [x]  Care Everywhere    [x]     [x]  External Sources ([facility], [facility], [facility], etc.)    [x] [facility]   [x] [facility]   [] Other:    [x]  Care Team Updated   []  Removed  or Duplicate Orders   [x]  Immunization Reconciliation Completed / Queried    [x] [place]   [] [place]   [] [place]   [] [place]      Health Maintenance Topics Addressed and Outreach Outcomes / Actions Taken:             Breast Cancer Screening []  Mammogram Order Placed    []  Mammogram Screening Scheduled    []  External Records Requested & Care Team Updated if Applicable    []  External Records Uploaded & Care Team Updated if Applicable    []  Pt Declined Scheduling Mammogram    []  Pt Will Schedule with External Provider / Order Routed & Care Team Updated if Applicable              Cervical Cancer Screening []  Pap Smear Scheduled in Primary Care or OBGYN    [x]  External Records Requested & Care Team Updated if Applicable       []  External Records Uploaded, Care Team Updated, & History Updated if Applicable    []  Patient Declined Scheduling Pap Smear    []  Patient Will Schedule with External Provider & Care Team Updated if Applicable                  Colorectal Cancer Screening []  Colonoscopy Case Request / Referral / Home Test Order Placed    [x]  External Records Requested & Care Team Updated if Applicable    []  External Records Uploaded, Care Team Updated, & History Updated if Applicable    []  Patient Declined Completing  Colon Cancer Screening    []  Patient Will Schedule with External Provider & Care Team Updated if Applicable    []  Fit Kit Mailed (add the SmartPhrase under additional notes)    []  Reminded Patient to Complete Home Test                Diabetic Eye Exam []  Eye Exam Screening Order Placed    []  Eye Camera Scheduled or Optometry/Ophthalmology Referral Placed    []  External Records Requested & Care Team Updated if Applicable    []  External Records Uploaded, Care Team Updated, & History Updated if Applicable    []  Patient Declined Scheduling Eye Exam    []  Patient Will Schedule with External Provider & Care Team Updated if Applicable             Blood Pressure Control []  Primary Care Follow Up Visit Scheduled     []  Remote Blood Pressure Reading Captured    []  Patient Declined Remote Reading or Scheduling Appt - Escalated to PCP    []  Patient Will Call Back or Send Portal Message with Reading                 HbA1c & Other Labs []  Overdue Lab(s) Ordered    []  Overdue Lab(s) Scheduled    []  External Records Uploaded & Care Team Updated if Applicable    []  Primary Care Follow Up Visit Scheduled     []  Reminded Patient to Complete A1c Home Test    []  Patient Declined Scheduling Labs or Will Call Back to Schedule    []  Patient Will Schedule with External Provider / Order Routed, & Care Team Updated if Applicable           Primary Care Appointment []  Primary Care Appt Scheduled    []  Patient Declined Scheduling or Will Call Back to Schedule    []  Pt Established with External Provider, Updated Care Team, & Informed Pt to Notify Payor if Applicable           Medication Adherence /    Statin Use []  Primary Care Appointment Scheduled    []  Patient Reminded to  Prescription    []  Patient Declined, Provider Notified if Needed    []  Sent Provider Message to Review to Evaluate Pt for Statin, Add Exclusion Dx Codes, Document   Exclusion in Problem List, Change Statin Intensity Level to Moderate or High  Intensity if Applicable                Osteoporosis Screening []  Dexa Order Placed    []  Dexa Appointment Scheduled    []  External Records Requested & Care Team Updated    []  External Records Uploaded, Care Team Updated, & History Updated if Applicable    []  Patient Declined Scheduling Dexa or Will Call Back to Schedule    []  Patient Will Schedule with External Provider / Order Routed & Care Team Updated if Applicable       Additional Notes:    Record request sent to Dr. Fernando Parra. Record request sent to Dr. Ole Nguyen. Chart review completed.

## 2024-01-25 NOTE — PROGRESS NOTES
Health Maintenance Due   Topic Date Due    Cervical Cancer Screening  Never done    HIV Screening  Never done    TETANUS VACCINE  Never done    Hemoglobin A1c (Diabetic Prevention Screening)  Never done    Pneumococcal Vaccines (Age 0-64) (2 - PCV) 2019    Shingles Vaccine (1 of 2) Never done    Lipid Panel  2021    Influenza Vaccine (1) 2023     Population Health Chart Review & Patient Outreach Details    Updates Requested / Reviewed:     [x]  Care Everywhere    [x]     []  External Sources ([facility], [facility], [facility], etc.)    [] [facility]   [] [facility]   [] Other:    []  Care Team Updated   []  Removed  or Duplicate Orders   [x]  Immunization Reconciliation Completed / Queried    [x] [place]   [] [place]   [] [place]   [] [place]      Health Maintenance Topics Addressed and Outreach Outcomes / Actions Taken:             Breast Cancer Screening []  Mammogram Order Placed    []  Mammogram Screening Scheduled    []  External Records Requested & Care Team Updated if Applicable    []  External Records Uploaded & Care Team Updated if Applicable    []  Pt Declined Scheduling Mammogram    []  Pt Will Schedule with External Provider / Order Routed & Care Team Updated if Applicable              Cervical Cancer Screening []  Pap Smear Scheduled in Primary Care or OBGYN    []  External Records Requested & Care Team Updated if Applicable       []  External Records Uploaded, Care Team Updated, & History Updated if Applicable    []  Patient Declined Scheduling Pap Smear    []  Patient Will Schedule with External Provider & Care Team Updated if Applicable                  Colorectal Cancer Screening []  Colonoscopy Case Request / Referral / Home Test Order Placed    []  External Records Requested & Care Team Updated if Applicable    [x]  External Records Uploaded, Care Team Updated, & History Updated if Applicable    []  Patient Declined Completing Colon Cancer Screening    []  Patient Will Schedule  with External Provider & Care Team Updated if Applicable    []  Fit Kit Mailed (add the SmartPhrase under additional notes)    []  Reminded Patient to Complete Home Test                Diabetic Eye Exam []  Eye Exam Screening Order Placed    []  Eye Camera Scheduled or Optometry/Ophthalmology Referral Placed    []  External Records Requested & Care Team Updated if Applicable    []  External Records Uploaded, Care Team Updated, & History Updated if Applicable    []  Patient Declined Scheduling Eye Exam    []  Patient Will Schedule with External Provider & Care Team Updated if Applicable             Blood Pressure Control []  Primary Care Follow Up Visit Scheduled     []  Remote Blood Pressure Reading Captured    []  Patient Declined Remote Reading or Scheduling Appt - Escalated to PCP    []  Patient Will Call Back or Send Portal Message with Reading                 HbA1c & Other Labs []  Overdue Lab(s) Ordered    []  Overdue Lab(s) Scheduled    []  External Records Uploaded & Care Team Updated if Applicable    []  Primary Care Follow Up Visit Scheduled     []  Reminded Patient to Complete A1c Home Test    []  Patient Declined Scheduling Labs or Will Call Back to Schedule    []  Patient Will Schedule with External Provider / Order Routed, & Care Team Updated if Applicable           Primary Care Appointment []  Primary Care Appt Scheduled    []  Patient Declined Scheduling or Will Call Back to Schedule    []  Pt Established with External Provider, Updated Care Team, & Informed Pt to Notify Payor if Applicable           Medication Adherence /    Statin Use []  Primary Care Appointment Scheduled    []  Patient Reminded to  Prescription    []  Patient Declined, Provider Notified if Needed    []  Sent Provider Message to Review to Evaluate Pt for Statin, Add Exclusion Dx Codes, Document   Exclusion in Problem List, Change Statin Intensity Level to Moderate or High Intensity if Applicable                Osteoporosis  Screening []  Dexa Order Placed    []  Dexa Appointment Scheduled    []  External Records Requested & Care Team Updated    []  External Records Uploaded, Care Team Updated, & History Updated if Applicable    []  Patient Declined Scheduling Dexa or Will Call Back to Schedule    []  Patient Will Schedule with External Provider / Order Routed & Care Team Updated if Applicable       Additional Notes:    HM frequency set to repeat colonoscopy in 10 yrs per provider recommendation. Chart review completed.

## 2024-01-25 NOTE — LETTER
AUTHORIZATION FOR RELEASE OF   CONFIDENTIAL INFORMATION    Dear Dr. Fernando Pak,    We are seeing Chaya Dee, date of birth 1970, in the clinic at [facility]. Didi Grewal NP is the patient's PCP. Chaya Dee has an outstanding lab/procedure at the time we reviewed her chart. In order to help keep her health information updated, she has authorized us to request the following medical record(s):        (  )  MAMMOGRAM                                      ( X )  COLONOSCOPY      (  )  PAP SMEAR                                          (  )  OUTSIDE LAB RESULTS     (  )  DEXA SCAN                                          (  )  EYE EXAM            (  )  FOOT EXAM                                          (  )  ENTIRE RECORD     (  )  OUTSIDE IMMUNIZATIONS                 (  )  _______________         Please fax records to Didi Grewal NP, [phone]     If you have any questions, please contact HETAL Kevin at [phone].           Patient Name: Chaya Dee  : 1970  Patient Phone #: [phone]

## 2024-01-27 ENCOUNTER — LAB VISIT (OUTPATIENT)
Dept: LAB | Facility: HOSPITAL | Age: 54
End: 2024-01-27
Payer: COMMERCIAL

## 2024-01-27 DIAGNOSIS — Z00.00 ANNUAL PHYSICAL EXAM: ICD-10-CM

## 2024-01-27 LAB
25(OH)D3+25(OH)D2 SERPL-MCNC: 85 NG/ML (ref 30–96)
ALBUMIN SERPL BCP-MCNC: 3.5 G/DL (ref 3.5–5.2)
ALP SERPL-CCNC: 91 U/L (ref 55–135)
ALT SERPL W/O P-5'-P-CCNC: 15 U/L (ref 10–44)
ANION GAP SERPL CALC-SCNC: 6 MMOL/L (ref 8–16)
AST SERPL-CCNC: 12 U/L (ref 10–40)
BASOPHILS # BLD AUTO: 0.06 K/UL (ref 0–0.2)
BASOPHILS NFR BLD: 1.1 % (ref 0–1.9)
BILIRUB SERPL-MCNC: 0.5 MG/DL (ref 0.1–1)
BUN SERPL-MCNC: 19 MG/DL (ref 6–20)
CALCIUM SERPL-MCNC: 8.9 MG/DL (ref 8.7–10.5)
CHLORIDE SERPL-SCNC: 109 MMOL/L (ref 95–110)
CHOLEST SERPL-MCNC: 177 MG/DL (ref 120–199)
CHOLEST/HDLC SERPL: 2.8 {RATIO} (ref 2–5)
CO2 SERPL-SCNC: 25 MMOL/L (ref 23–29)
CREAT SERPL-MCNC: 0.8 MG/DL (ref 0.5–1.4)
DIFFERENTIAL METHOD BLD: ABNORMAL
EOSINOPHIL # BLD AUTO: 0.1 K/UL (ref 0–0.5)
EOSINOPHIL NFR BLD: 2.4 % (ref 0–8)
ERYTHROCYTE [DISTWIDTH] IN BLOOD BY AUTOMATED COUNT: 13.5 % (ref 11.5–14.5)
EST. GFR  (NO RACE VARIABLE): >60 ML/MIN/1.73 M^2
ESTIMATED AVG GLUCOSE: 111 MG/DL (ref 68–131)
GLUCOSE SERPL-MCNC: 88 MG/DL (ref 70–110)
HBA1C MFR BLD: 5.5 % (ref 4–5.6)
HCT VFR BLD AUTO: 36.3 % (ref 37–48.5)
HDLC SERPL-MCNC: 64 MG/DL (ref 40–75)
HDLC SERPL: 36.2 % (ref 20–50)
HGB BLD-MCNC: 11.3 G/DL (ref 12–16)
IMM GRANULOCYTES # BLD AUTO: 0.02 K/UL (ref 0–0.04)
IMM GRANULOCYTES NFR BLD AUTO: 0.4 % (ref 0–0.5)
LDLC SERPL CALC-MCNC: 91.4 MG/DL (ref 63–159)
LYMPHOCYTES # BLD AUTO: 1.9 K/UL (ref 1–4.8)
LYMPHOCYTES NFR BLD: 35.9 % (ref 18–48)
MCH RBC QN AUTO: 26.5 PG (ref 27–31)
MCHC RBC AUTO-ENTMCNC: 31.1 G/DL (ref 32–36)
MCV RBC AUTO: 85 FL (ref 82–98)
MONOCYTES # BLD AUTO: 0.8 K/UL (ref 0.3–1)
MONOCYTES NFR BLD: 14.5 % (ref 4–15)
NEUTROPHILS # BLD AUTO: 2.5 K/UL (ref 1.8–7.7)
NEUTROPHILS NFR BLD: 45.7 % (ref 38–73)
NONHDLC SERPL-MCNC: 113 MG/DL
NRBC BLD-RTO: 0 /100 WBC
PLATELET # BLD AUTO: 203 K/UL (ref 150–450)
PMV BLD AUTO: 10.4 FL (ref 9.2–12.9)
POTASSIUM SERPL-SCNC: 4.3 MMOL/L (ref 3.5–5.1)
PROT SERPL-MCNC: 6 G/DL (ref 6–8.4)
RBC # BLD AUTO: 4.26 M/UL (ref 4–5.4)
SODIUM SERPL-SCNC: 140 MMOL/L (ref 136–145)
TRIGL SERPL-MCNC: 108 MG/DL (ref 30–150)
TSH SERPL DL<=0.005 MIU/L-ACNC: 1.11 UIU/ML (ref 0.4–4)
WBC # BLD AUTO: 5.38 K/UL (ref 3.9–12.7)

## 2024-01-27 PROCEDURE — 83036 HEMOGLOBIN GLYCOSYLATED A1C: CPT | Performed by: NURSE PRACTITIONER

## 2024-01-27 PROCEDURE — 36415 COLL VENOUS BLD VENIPUNCTURE: CPT | Performed by: NURSE PRACTITIONER

## 2024-01-27 PROCEDURE — 82306 VITAMIN D 25 HYDROXY: CPT | Performed by: NURSE PRACTITIONER

## 2024-01-27 PROCEDURE — 80053 COMPREHEN METABOLIC PANEL: CPT | Performed by: NURSE PRACTITIONER

## 2024-01-27 PROCEDURE — 84443 ASSAY THYROID STIM HORMONE: CPT | Performed by: NURSE PRACTITIONER

## 2024-01-27 PROCEDURE — 80061 LIPID PANEL: CPT | Performed by: NURSE PRACTITIONER

## 2024-01-27 PROCEDURE — 85025 COMPLETE CBC W/AUTO DIFF WBC: CPT | Performed by: NURSE PRACTITIONER

## 2024-01-29 ENCOUNTER — PATIENT MESSAGE (OUTPATIENT)
Dept: INTERNAL MEDICINE | Facility: CLINIC | Age: 54
End: 2024-01-29
Payer: COMMERCIAL

## 2024-01-29 ENCOUNTER — PATIENT OUTREACH (OUTPATIENT)
Dept: ADMINISTRATIVE | Facility: HOSPITAL | Age: 54
End: 2024-01-29
Payer: COMMERCIAL

## 2024-01-29 NOTE — PROGRESS NOTES
Health Maintenance Due   Topic Date Due    HIV Screening  Never done    TETANUS VACCINE  Never done    Pneumococcal Vaccines (Age 0-64) (2 of 2 - PCV) 2019    Shingles Vaccine (1 of 2) Never done     Population Health Chart Review & Patient Outreach Details    Updates Requested / Reviewed:     [x]  Care Everywhere    [x]     []  External Sources ([facility], [facility], [facility], etc.)    [] [facility]   [] [facility]   [] Other:    [x]  Care Team Updated   []  Removed  or Duplicate Orders   [x]  Immunization Reconciliation Completed / Queried    [x] [place]   [] [place]   [] [place]   [] [place]      Health Maintenance Topics Addressed and Outreach Outcomes / Actions Taken:             Breast Cancer Screening []  Mammogram Order Placed    []  Mammogram Screening Scheduled    []  External Records Requested & Care Team Updated if Applicable    []  External Records Uploaded & Care Team Updated if Applicable    []  Pt Declined Scheduling Mammogram    []  Pt Will Schedule with External Provider / Order Routed & Care Team Updated if Applicable              Cervical Cancer Screening []  Pap Smear Scheduled in Primary Care or OBGYN    []  External Records Requested & Care Team Updated if Applicable       [x]  External Records Uploaded, Care Team Updated, & History Updated if Applicable    []  Patient Declined Scheduling Pap Smear    []  Patient Will Schedule with External Provider & Care Team Updated if Applicable                  Colorectal Cancer Screening []  Colonoscopy Case Request / Referral / Home Test Order Placed    []  External Records Requested & Care Team Updated if Applicable    []  External Records Uploaded, Care Team Updated, & History Updated if Applicable    []  Patient Declined Completing Colon Cancer Screening    []  Patient Will Schedule with External Provider & Care Team Updated if Applicable    []  Fit Kit Mailed (add the SmartPhrase under additional notes)    []  Reminded Patient to Complete  Home Test                Diabetic Eye Exam []  Eye Exam Screening Order Placed    []  Eye Camera Scheduled or Optometry/Ophthalmology Referral Placed    []  External Records Requested & Care Team Updated if Applicable    []  External Records Uploaded, Care Team Updated, & History Updated if Applicable    []  Patient Declined Scheduling Eye Exam    []  Patient Will Schedule with External Provider & Care Team Updated if Applicable             Blood Pressure Control []  Primary Care Follow Up Visit Scheduled     []  Remote Blood Pressure Reading Captured    []  Patient Declined Remote Reading or Scheduling Appt - Escalated to PCP    []  Patient Will Call Back or Send Portal Message with Reading                 HbA1c & Other Labs []  Overdue Lab(s) Ordered    []  Overdue Lab(s) Scheduled    []  External Records Uploaded & Care Team Updated if Applicable    []  Primary Care Follow Up Visit Scheduled     []  Reminded Patient to Complete A1c Home Test    []  Patient Declined Scheduling Labs or Will Call Back to Schedule    []  Patient Will Schedule with External Provider / Order Routed, & Care Team Updated if Applicable           Primary Care Appointment []  Primary Care Appt Scheduled    []  Patient Declined Scheduling or Will Call Back to Schedule    []  Pt Established with External Provider, Updated Care Team, & Informed Pt to Notify Payor if Applicable           Medication Adherence /    Statin Use []  Primary Care Appointment Scheduled    []  Patient Reminded to  Prescription    []  Patient Declined, Provider Notified if Needed    []  Sent Provider Message to Review to Evaluate Pt for Statin, Add Exclusion Dx Codes, Document   Exclusion in Problem List, Change Statin Intensity Level to Moderate or High Intensity if Applicable                Osteoporosis Screening []  Dexa Order Placed    []  Dexa Appointment Scheduled    []  External Records Requested & Care Team Updated    []  External Records Uploaded, Care  Team Updated, & History Updated if Applicable    []  Patient Declined Scheduling Dexa or Will Call Back to Schedule    []  Patient Will Schedule with External Provider / Order Routed & Care Team Updated if Applicable       Additional Notes:    Chart review completed.

## 2024-01-30 DIAGNOSIS — D64.9 ANEMIA, UNSPECIFIED TYPE: Primary | ICD-10-CM

## 2024-01-31 ENCOUNTER — LAB VISIT (OUTPATIENT)
Dept: LAB | Facility: HOSPITAL | Age: 54
End: 2024-01-31
Payer: COMMERCIAL

## 2024-01-31 DIAGNOSIS — D64.9 ANEMIA, UNSPECIFIED TYPE: ICD-10-CM

## 2024-01-31 LAB
FERRITIN SERPL-MCNC: 9 NG/ML (ref 20–300)
IRON SERPL-MCNC: 40 UG/DL (ref 30–160)
SATURATED IRON: 8 % (ref 20–50)
TOTAL IRON BINDING CAPACITY: 514 UG/DL (ref 250–450)
TRANSFERRIN SERPL-MCNC: 347 MG/DL (ref 200–375)

## 2024-01-31 PROCEDURE — 83921 ORGANIC ACID SINGLE QUANT: CPT | Performed by: NURSE PRACTITIONER

## 2024-01-31 PROCEDURE — 83540 ASSAY OF IRON: CPT | Performed by: NURSE PRACTITIONER

## 2024-01-31 PROCEDURE — 82728 ASSAY OF FERRITIN: CPT | Performed by: NURSE PRACTITIONER

## 2024-01-31 PROCEDURE — 82607 VITAMIN B-12: CPT | Performed by: NURSE PRACTITIONER

## 2024-02-01 LAB — VIT B12 SERPL-MCNC: 367 NG/L (ref 180–914)

## 2024-02-06 ENCOUNTER — PATIENT MESSAGE (OUTPATIENT)
Dept: INTERNAL MEDICINE | Facility: CLINIC | Age: 54
End: 2024-02-06
Payer: COMMERCIAL

## 2024-02-07 ENCOUNTER — LAB VISIT (OUTPATIENT)
Dept: LAB | Facility: HOSPITAL | Age: 54
End: 2024-02-07
Payer: COMMERCIAL

## 2024-02-07 DIAGNOSIS — R19.7 DIARRHEA, UNSPECIFIED TYPE: ICD-10-CM

## 2024-02-07 PROCEDURE — 82272 OCCULT BLD FECES 1-3 TESTS: CPT | Performed by: NURSE PRACTITIONER

## 2024-02-07 PROCEDURE — 87425 ROTAVIRUS AG IA: CPT | Performed by: NURSE PRACTITIONER

## 2024-02-07 PROCEDURE — 87329 GIARDIA AG IA: CPT | Performed by: NURSE PRACTITIONER

## 2024-02-07 PROCEDURE — 87798 DETECT AGENT NOS DNA AMP: CPT | Performed by: NURSE PRACTITIONER

## 2024-02-07 PROCEDURE — 87209 SMEAR COMPLEX STAIN: CPT | Performed by: NURSE PRACTITIONER

## 2024-02-08 LAB
CRYPTOSP AG STL QL IA: NEGATIVE
G LAMBLIA AG STL QL IA: NEGATIVE
OB PNL STL: NEGATIVE
RV AG STL QL IA.RAPID: NEGATIVE

## 2024-02-09 LAB — O+P STL MICRO: NORMAL

## 2024-02-10 LAB
HADV DNA SERPL QL NAA+PROBE: NEGATIVE
SPECIMEN SOURCE: NORMAL

## 2024-02-15 ENCOUNTER — PATIENT MESSAGE (OUTPATIENT)
Dept: INTERNAL MEDICINE | Facility: CLINIC | Age: 54
End: 2024-02-15
Payer: COMMERCIAL

## 2024-02-15 DIAGNOSIS — D64.9 ANEMIA, UNSPECIFIED TYPE: Primary | ICD-10-CM

## 2024-02-27 LAB — METHYLMALONATE SERPL-SCNC: 0.13 NMOL/ML

## 2024-03-01 ENCOUNTER — LAB VISIT (OUTPATIENT)
Dept: LAB | Facility: HOSPITAL | Age: 54
End: 2024-03-01
Payer: COMMERCIAL

## 2024-03-01 DIAGNOSIS — D64.9 ANEMIA, UNSPECIFIED TYPE: ICD-10-CM

## 2024-03-01 LAB
BASOPHILS # BLD AUTO: 0.04 K/UL (ref 0–0.2)
BASOPHILS NFR BLD: 0.7 % (ref 0–1.9)
DIFFERENTIAL METHOD BLD: ABNORMAL
EOSINOPHIL # BLD AUTO: 0 K/UL (ref 0–0.5)
EOSINOPHIL NFR BLD: 0.7 % (ref 0–8)
ERYTHROCYTE [DISTWIDTH] IN BLOOD BY AUTOMATED COUNT: 13.5 % (ref 11.5–14.5)
HCT VFR BLD AUTO: 38.3 % (ref 37–48.5)
HGB BLD-MCNC: 12.2 G/DL (ref 12–16)
IMM GRANULOCYTES # BLD AUTO: 0.02 K/UL (ref 0–0.04)
IMM GRANULOCYTES NFR BLD AUTO: 0.3 % (ref 0–0.5)
LYMPHOCYTES # BLD AUTO: 1.6 K/UL (ref 1–4.8)
LYMPHOCYTES NFR BLD: 27.2 % (ref 18–48)
MCH RBC QN AUTO: 25.7 PG (ref 27–31)
MCHC RBC AUTO-ENTMCNC: 31.9 G/DL (ref 32–36)
MCV RBC AUTO: 81 FL (ref 82–98)
MONOCYTES # BLD AUTO: 0.5 K/UL (ref 0.3–1)
MONOCYTES NFR BLD: 8.1 % (ref 4–15)
NEUTROPHILS # BLD AUTO: 3.8 K/UL (ref 1.8–7.7)
NEUTROPHILS NFR BLD: 63 % (ref 38–73)
NRBC BLD-RTO: 0 /100 WBC
PLATELET # BLD AUTO: 238 K/UL (ref 150–450)
PMV BLD AUTO: 10.6 FL (ref 9.2–12.9)
RBC # BLD AUTO: 4.75 M/UL (ref 4–5.4)
WBC # BLD AUTO: 5.96 K/UL (ref 3.9–12.7)

## 2024-03-01 PROCEDURE — 36415 COLL VENOUS BLD VENIPUNCTURE: CPT | Performed by: NURSE PRACTITIONER

## 2024-03-01 PROCEDURE — 85025 COMPLETE CBC W/AUTO DIFF WBC: CPT | Performed by: NURSE PRACTITIONER

## 2024-03-04 ENCOUNTER — PATIENT MESSAGE (OUTPATIENT)
Dept: INTERNAL MEDICINE | Facility: CLINIC | Age: 54
End: 2024-03-04
Payer: COMMERCIAL

## 2024-03-06 ENCOUNTER — PATIENT MESSAGE (OUTPATIENT)
Dept: NEUROLOGY | Facility: CLINIC | Age: 54
End: 2024-03-06

## 2024-03-06 ENCOUNTER — OFFICE VISIT (OUTPATIENT)
Dept: NEUROLOGY | Facility: CLINIC | Age: 54
End: 2024-03-06
Payer: COMMERCIAL

## 2024-03-06 VITALS — DIASTOLIC BLOOD PRESSURE: 87 MMHG | SYSTOLIC BLOOD PRESSURE: 122 MMHG | HEART RATE: 84 BPM

## 2024-03-06 DIAGNOSIS — G44.86 CERVICOGENIC HEADACHE: ICD-10-CM

## 2024-03-06 DIAGNOSIS — M79.18 CERVICAL MYOFASCIAL PAIN SYNDROME: ICD-10-CM

## 2024-03-06 DIAGNOSIS — G43.009 MIGRAINE WITHOUT AURA AND WITHOUT STATUS MIGRAINOSUS, NOT INTRACTABLE: Primary | ICD-10-CM

## 2024-03-06 PROBLEM — G43.019 INTRACTABLE MIGRAINE WITHOUT AURA AND WITHOUT STATUS MIGRAINOSUS: Status: RESOLVED | Noted: 2021-04-19 | Resolved: 2024-03-06

## 2024-03-06 PROCEDURE — G2211 COMPLEX E/M VISIT ADD ON: HCPCS | Mod: S$GLB,,, | Performed by: NURSE PRACTITIONER

## 2024-03-06 PROCEDURE — 99214 OFFICE O/P EST MOD 30 MIN: CPT | Mod: S$GLB,,, | Performed by: NURSE PRACTITIONER

## 2024-03-06 PROCEDURE — 99999 PR PBB SHADOW E&M-EST. PATIENT-LVL III: CPT | Mod: PBBFAC,,, | Performed by: NURSE PRACTITIONER

## 2024-03-06 RX ORDER — BACLOFEN 10 MG/1
10 TABLET ORAL EVERY 8 HOURS PRN
Qty: 270 TABLET | Refills: 1 | Status: SHIPPED | OUTPATIENT
Start: 2024-03-06

## 2024-03-06 RX ORDER — RIZATRIPTAN BENZOATE 10 MG/1
10 TABLET, ORALLY DISINTEGRATING ORAL
Qty: 12 TABLET | Refills: 5 | Status: SHIPPED | OUTPATIENT
Start: 2024-03-06

## 2024-03-06 RX ORDER — TOPIRAMATE 25 MG/1
25 TABLET ORAL 2 TIMES DAILY
Qty: 60 TABLET | Refills: 0 | Status: SHIPPED | OUTPATIENT
Start: 2024-03-06 | End: 2024-04-02

## 2024-03-06 NOTE — PROGRESS NOTES
"Established Patient   SUBJECTIVE:  Patient ID: Chaya Dee   Chief Complaint: Follow-up    History of Present Illness:  Chaya Dee is a 54 y.o. female who presents to clinic alone for follow-up of headaches.     Recommendations made at last Office Visit on 6/23/23:  - For migraine prevention - continue emgality 120 mg SQ monthly, topiramate 50 mg bid   - for acute migraines - maxalt 10 mg mlt   - Continue tracking headaches   - RTC in 6 months or sooner if needed    03/06/2024 - Interval History:  Migrianes have been under great control, has only had 3 migraines since last visit (9 months ago).      She has continued emgality 120 mg SQ monthly and topiramate 50 mg bid for migraine prevention, would be interested in beginning to taper topiramate.     Otherwise, information below is still accurate and current.     06/23/2023 - Interval History:  Only two headaches since her last visit 3 months ago!  Emgality has been working "great", migraines only lasted a couple hours in duration.  She remains very pleased with the current state of her migraines, does not wish to make adjustments to her treatment plan.     Otherwise, information below is still accurate and current.      03/10/2023 - Interval History:  Has completed loading dose emgality and one maintenance dose of emgality, due for second maintenance dose in two days.  Since starting Emgality, migraines have greatly improved, went from having a daily headache down to only 5 headache days per month.  Headaches are lasting only an hour in duration.  Feels headaches are "totally manageable".    Completed healthy back and neck physical therapy two days ago, has f/up appt later today with pain medicine to decide how to proceed as far as procedures/injections/nerve blocks etc.       Otherwise, information below is still accurate and current.      12/28/2022 - Interval History:  She has continued suffering with near daily headaches which are present all " day, everyday.  Having two different types of headaches, one of which is throbbing, one sided.  Feels headaches are manageable at this point and not interfering with her ability to continue day to day activities.  She is currently taking topiramate 50 mg bid for headache prevention, she does feel this has helped to decrease the intensity of her headaches.  Taking tizanidine 2-4 mg only on occasion.  Has tried flexeril in the past which she did not feel provided her with any relief.  She is enrolled in healthy back program, has been going to physical therapy routinely without any noticeable improvement.   Continues to feel her neck is playing a large role in her headaches, will do stretches recommended by PT which helps for a short while before she feels her neck/upper back muscles begin to start tightening back up.      Otherwise, information below is still accurate and current.      09/21/2022 - Interval History:  She again attempted to taper topiramate beginning the day after her last visit, noticed she began to experience more frequent headaches and migraines became more intense so went back to taking 50 mg nightly (has been back on 50 mg nightly dose for over 2 months now).    Currently suffering with a headache on average 3 days per week, the majority of which come on in the afternoon, before dinnertime, maybe around 2 pm.  If she treats with maxalt 10 mg mlt migraine will last a couple of hours, otherwise will last the remainder of the day, occasionally will carry into the following day.    Headaches began to occur more frequently around July.    Stops drinking caffeine around 10:30 am, drinks at least 2 cups of coffee before this.       Sleep is poor - has trouble falling asleep and staying asleep. On average sleeps 4 hours per night. Does not feel rested upon waking.  Once she gets into the bed feels her mind will start to race, has a hard time turning her mind off.      Otherwise, information below is still  "accurate and current.      06/20/2022 - Interval History:  Tapered topiramate to 25 mg nightly which she had been taking nightly from end of March until a few weeks ago.  Noticed she was starting to experience more frequent migraines and subsequently decided to increase topiramate back to 50 mg nightly.  Since then, migraines have been back under control.  She continues to treat acute migraines with rizatriptan.  She would like to eventually get off topiramate.       Otherwise, information below is still accurate and current.      03/30/2022 - Interval History:  Migraines have been much improved since starting topiramate.  Currently suffering with only one migraine per month each of which lasts about 1 day.  She is taking topiramate 50 mg nightly for migraine prevention.  Only rarely takes maxalt for acute migraines.  She is very pleased with the improvement in her migraines.  Would like to attempt to taper off topiramate.       Otherwise, information below is still accurate and current.      History of Present Illness:   51 y.o. female with chronic allergic rhinitis, headaches and rheumatoid arthritis, who presents to clinic alone for evaluation of headaches.  Long history of sinus issues, with chronic daily headaches/facial pain.  Reports once per week she experiences unbearable pain which persists all day in duration.  Headaches are described as a moderate to severe, pressure and pulsating pain "like I'm wearing pain goggles" located bilateral cheeks, forehead, and temporalis bilaterally.  Headaches can last anywhere from an hour up to all day in duration.   Pain can be rated anywhere from 3 to 8 out of 10, intensifying to peak over more than an hour.  Made worse by physical movement.  Associated symptoms include congestion, photophobia, nausea (only "once in a blue moon").  She does have a history of migraines in her past, which were hormonally driven, migraines resolved after she completed hormone therapy.  She " has not been able to identify any trigger.  On days when headaches are worse, she is able to continue working however finds it more difficult to concentrate.       Treatments Tried and Response  Sumatriptan   Amitriptyline -   Gabapentin 200 mg qhs - for RA  celebrex - for RA  topiramate - helping   maxalt 10 mg mlt - helps   Baclofen -   Emgality -     Current Medications:    celecoxib (CELEBREX) 200 MG capsule, Take 200 mg by mouth once daily., Disp: , Rfl:     cholecalciferol, vitamin D3, 125 mcg (5,000 unit) Tab, Take by mouth., Disp: , Rfl:     FLONASE ALLERGY RELIEF 50 mcg/actuation nasal spray, 1 spray by Each Nostril route., Disp: , Rfl:     hydrOXYzine HCL (ATARAX) 25 MG tablet, Take 1 tablet (25 mg total) by mouth nightly as needed for Itching., Disp: 30 tablet, Rfl: 3    meloxicam (MOBIC) 15 MG tablet, Take 15 mg by mouth once daily., Disp: , Rfl:     baclofen (LIORESAL) 10 MG tablet, Take 1 tablet (10 mg total) by mouth every 8 (eight) hours as needed (muscle spasm). FOR MUSCLE SPASM, Disp: 270 tablet, Rfl: 1    galcanezumab-gnlm 120 mg/mL PnIj, Inject 1 mL (120 mg total) into the skin every 28 days. Begin 28 days after loading dose., Disp: 1 mL, Rfl: 11    rizatriptan (MAXALT-MLT) 10 MG disintegrating tablet, Take 1 tablet (10 mg total) by mouth every 2 (two) hours as needed for Migraine. Max 30 mg/day., Disp: 12 tablet, Rfl: 5    topiramate (TOPAMAX) 25 MG tablet, Take 1 tablet (25 mg total) by mouth 2 (two) times daily., Disp: 60 tablet, Rfl: 0    Review of Systems - A review of 10+ systems was conducted with pertinent positive and negative findings documented in HPI with all other systems reviewed and negative.    PFSH: Past medical, family, and social history reviewed as documented in chart with pertinent positive medical, family, and social history detailed in HPI.    OBJECTIVE:  Vitals:  There were no vitals taken for this visit.     Physical Exam:  Constitutional: she appears well-developed and  well-nourished. she is well groomed. NAD    Review of Data:   Notes from internal medicine, pain medicine reviewed   Labs:  Lab Visit on 03/01/2024   Component Date Value Ref Range Status    WBC 03/01/2024 5.96  3.90 - 12.70 K/uL Final    RBC 03/01/2024 4.75  4.00 - 5.40 M/uL Final    Hemoglobin 03/01/2024 12.2  12.0 - 16.0 g/dL Final    Hematocrit 03/01/2024 38.3  37.0 - 48.5 % Final    MCV 03/01/2024 81 (L)  82 - 98 fL Final    MCH 03/01/2024 25.7 (L)  27.0 - 31.0 pg Final    MCHC 03/01/2024 31.9 (L)  32.0 - 36.0 g/dL Final    RDW 03/01/2024 13.5  11.5 - 14.5 % Final    Platelets 03/01/2024 238  150 - 450 K/uL Final    MPV 03/01/2024 10.6  9.2 - 12.9 fL Final    Immature Granulocytes 03/01/2024 0.3  0.0 - 0.5 % Final    Gran # (ANC) 03/01/2024 3.8  1.8 - 7.7 K/uL Final    Immature Grans (Abs) 03/01/2024 0.02  0.00 - 0.04 K/uL Final    Lymph # 03/01/2024 1.6  1.0 - 4.8 K/uL Final    Mono # 03/01/2024 0.5  0.3 - 1.0 K/uL Final    Eos # 03/01/2024 0.0  0.0 - 0.5 K/uL Final    Baso # 03/01/2024 0.04  0.00 - 0.20 K/uL Final    nRBC 03/01/2024 0  0 /100 WBC Final    Gran % 03/01/2024 63.0  38.0 - 73.0 % Final    Lymph % 03/01/2024 27.2  18.0 - 48.0 % Final    Mono % 03/01/2024 8.1  4.0 - 15.0 % Final    Eosinophil % 03/01/2024 0.7  0.0 - 8.0 % Final    Basophil % 03/01/2024 0.7  0.0 - 1.9 % Final    Differential Method 03/01/2024 Automated   Final   Lab Visit on 02/07/2024   Component Date Value Ref Range Status    Occult Blood 02/07/2024 Negative  Negative Final    Rotavirus 02/07/2024 Negative  Negative Final    Adenovirus Specimen Source 02/07/2024 Stool   Final    Adenovirus PCR Result 02/07/2024 Negative  Negative Final    Giardia Antigen - EIA 02/07/2024 Negative  Negative Final    Cryptosporidium Antigen 02/07/2024 Negative  Negative Final    Stool Exam-Ova,Cysts,Parasites 02/07/2024 FINAL 02/09/2024 1424   Final   Lab Visit on 01/31/2024   Component Date Value Ref Range Status    Iron 01/31/2024 40  30 - 160  ug/dL Final    Transferrin 01/31/2024 347  200 - 375 mg/dL Final    TIBC 01/31/2024 514 (H)  250 - 450 ug/dL Final    Saturated Iron 01/31/2024 8 (L)  20 - 50 % Final    Ferritin 01/31/2024 9 (L)  20.0 - 300.0 ng/mL Final    Vitamin B-12 01/31/2024 367  180 - 914 ng/L Final    B12 Def. Methylmalonic Acid 01/31/2024 0.13  <=0.40 nmol/mL Final   Patient Outreach on 01/29/2024   Component Date Value Ref Range Status    PAP Recommendation External 05/31/2023 No follow-up frequency specified   Final    Pap 05/31/2023 Negative for intraephithelial lesion or malignancy  Negative for intraephithelial lesion or malignancy, Other Final    HPV DNA 05/31/2023 None Detected  None Detected Final   Lab Visit on 01/27/2024   Component Date Value Ref Range Status    Stool Culture 01/27/2024 No enteric mahesh   Final    Stool Culture 01/27/2024 No Salmonella,Shigella,Vibrio,Campylobacter,Yersinia isolated.   Final    Shiga Toxin 1 E.coli 01/27/2024 Negative   Final    Shiga Toxin 2 E.coli 01/27/2024 Negative   Final   Lab Visit on 01/27/2024   Component Date Value Ref Range Status    WBC 01/27/2024 5.38  3.90 - 12.70 K/uL Final    RBC 01/27/2024 4.26  4.00 - 5.40 M/uL Final    Hemoglobin 01/27/2024 11.3 (L)  12.0 - 16.0 g/dL Final    Hematocrit 01/27/2024 36.3 (L)  37.0 - 48.5 % Final    MCV 01/27/2024 85  82 - 98 fL Final    MCH 01/27/2024 26.5 (L)  27.0 - 31.0 pg Final    MCHC 01/27/2024 31.1 (L)  32.0 - 36.0 g/dL Final    RDW 01/27/2024 13.5  11.5 - 14.5 % Final    Platelets 01/27/2024 203  150 - 450 K/uL Final    MPV 01/27/2024 10.4  9.2 - 12.9 fL Final    Immature Granulocytes 01/27/2024 0.4  0.0 - 0.5 % Final    Gran # (ANC) 01/27/2024 2.5  1.8 - 7.7 K/uL Final    Immature Grans (Abs) 01/27/2024 0.02  0.00 - 0.04 K/uL Final    Lymph # 01/27/2024 1.9  1.0 - 4.8 K/uL Final    Mono # 01/27/2024 0.8  0.3 - 1.0 K/uL Final    Eos # 01/27/2024 0.1  0.0 - 0.5 K/uL Final    Baso # 01/27/2024 0.06  0.00 - 0.20 K/uL Final    nRBC  01/27/2024 0  0 /100 WBC Final    Gran % 01/27/2024 45.7  38.0 - 73.0 % Final    Lymph % 01/27/2024 35.9  18.0 - 48.0 % Final    Mono % 01/27/2024 14.5  4.0 - 15.0 % Final    Eosinophil % 01/27/2024 2.4  0.0 - 8.0 % Final    Basophil % 01/27/2024 1.1  0.0 - 1.9 % Final    Differential Method 01/27/2024 Automated   Final    Sodium 01/27/2024 140  136 - 145 mmol/L Final    Potassium 01/27/2024 4.3  3.5 - 5.1 mmol/L Final    Chloride 01/27/2024 109  95 - 110 mmol/L Final    CO2 01/27/2024 25  23 - 29 mmol/L Final    Glucose 01/27/2024 88  70 - 110 mg/dL Final    BUN 01/27/2024 19  6 - 20 mg/dL Final    Creatinine 01/27/2024 0.8  0.5 - 1.4 mg/dL Final    Calcium 01/27/2024 8.9  8.7 - 10.5 mg/dL Final    Total Protein 01/27/2024 6.0  6.0 - 8.4 g/dL Final    Albumin 01/27/2024 3.5  3.5 - 5.2 g/dL Final    Total Bilirubin 01/27/2024 0.5  0.1 - 1.0 mg/dL Final    Alkaline Phosphatase 01/27/2024 91  55 - 135 U/L Final    AST 01/27/2024 12  10 - 40 U/L Final    ALT 01/27/2024 15  10 - 44 U/L Final    eGFR 01/27/2024 >60.0  >60 mL/min/1.73 m^2 Final    Anion Gap 01/27/2024 6 (L)  8 - 16 mmol/L Final    Cholesterol 01/27/2024 177  120 - 199 mg/dL Final    Triglycerides 01/27/2024 108  30 - 150 mg/dL Final    HDL 01/27/2024 64  40 - 75 mg/dL Final    LDL Cholesterol 01/27/2024 91.4  63.0 - 159.0 mg/dL Final    HDL/Cholesterol Ratio 01/27/2024 36.2  20.0 - 50.0 % Final    Total Cholesterol/HDL Ratio 01/27/2024 2.8  2.0 - 5.0 Final    Non-HDL Cholesterol 01/27/2024 113  mg/dL Final    Hemoglobin A1C 01/27/2024 5.5  4.0 - 5.6 % Final    Estimated Avg Glucose 01/27/2024 111  68 - 131 mg/dL Final    TSH 01/27/2024 1.106  0.400 - 4.000 uIU/mL Final    Vit D, 25-Hydroxy 01/27/2024 85  30 - 96 ng/mL Final   Patient Outreach on 01/25/2024   Component Date Value Ref Range Status    CRC Recommendation External 10/26/2016 Repeat colonoscopy in 10 years   Final     Imaging:  No results found for this or any previous visit.  Note: I have  independently reviewed any/all imaging/labs/tests and agree with the report (s) as documented.  Any discrepancies will be as noted/demarcated by free text.  MARTINEZ SMITH 3/6/2024    ASSESSMENT:  1. Migraine without aura and without status migrainosus, not intractable    2. Cervical myofascial pain syndrome    3. Cervicogenic headache      PLAN:  - For migraine prevention - continue emgality 120 mg SQ monthly   - Will begin tapering process on topiramate - 25 mg AM/50 mg PM x 2 weeks, then 25 mg bid x 2 weeks, then 25 mg qhs x 2 weeks, stop   - for myofascial pain - baclofen 10 mg prn   - For acute migraines - maxalt 10 mg mlt   - Continue tracking headaches   - Send update via patient portal in one month or should headaches begin to worsen as she tapers topiramate   - RTC in 6 months or sooner if needed     Orders Placed This Encounter    topiramate (TOPAMAX) 25 MG tablet    rizatriptan (MAXALT-MLT) 10 MG disintegrating tablet    galcanezumab-gnlm 120 mg/mL PnIj    baclofen (LIORESAL) 10 MG tablet     Questions and concerns were sought and answered to the patient's stated verbal satisfaction.  The patient verbalizes understanding and agreement with the above stated treatment plan.     Visit today included increased complexity associated with the care of the episodic problem migriane without aura addressed and managing the longitudinal care of the patient due to the serious and/or complex managed problem.  Plan for patient to return to clinic in 6 months for follow-up.      CC: Didi Grewal, ESPERANZA Haas, MERARI-C  [facility]   [phone]    Dr. Rashid was available during today's encounter.

## 2024-04-02 DIAGNOSIS — G43.009 MIGRAINE WITHOUT AURA AND WITHOUT STATUS MIGRAINOSUS, NOT INTRACTABLE: ICD-10-CM

## 2024-04-02 RX ORDER — TOPIRAMATE 25 MG/1
25 TABLET ORAL 2 TIMES DAILY
Qty: 180 TABLET | Refills: 1 | Status: SHIPPED | OUTPATIENT
Start: 2024-04-02

## 2024-04-20 DIAGNOSIS — G47.00 INSOMNIA, UNSPECIFIED TYPE: ICD-10-CM

## 2024-04-22 RX ORDER — HYDROXYZINE HYDROCHLORIDE 25 MG/1
25 TABLET, FILM COATED ORAL NIGHTLY PRN
Qty: 90 TABLET | Refills: 0 | Status: SHIPPED | OUTPATIENT
Start: 2024-04-22

## 2024-07-22 DIAGNOSIS — G47.00 INSOMNIA, UNSPECIFIED TYPE: ICD-10-CM

## 2024-07-22 RX ORDER — HYDROXYZINE HYDROCHLORIDE 25 MG/1
25 TABLET, FILM COATED ORAL NIGHTLY PRN
Qty: 90 TABLET | Refills: 0 | Status: SHIPPED | OUTPATIENT
Start: 2024-07-22

## 2024-08-19 ENCOUNTER — PATIENT MESSAGE (OUTPATIENT)
Dept: ADMINISTRATIVE | Facility: HOSPITAL | Age: 54
End: 2024-08-19
Payer: COMMERCIAL

## 2024-08-27 ENCOUNTER — PATIENT OUTREACH (OUTPATIENT)
Dept: ADMINISTRATIVE | Facility: HOSPITAL | Age: 54
End: 2024-08-27
Payer: COMMERCIAL

## 2024-08-27 NOTE — PROGRESS NOTES
Health Maintenance Due   Topic Date Due    HIV Screening  Never done    TETANUS VACCINE  Never done    Pneumococcal Vaccines (Age 0-64) (2 of 2 - PCV) 04/02/2019    Shingles Vaccine (1 of 2) Never done     Triggered LINKS and reconciled immunizations. Updated Care Everywhere. Pt responded to campaigns outreach stating she completed her mammogram outside of [facility]- results pulled from Care Everywhere into . Chart review completed.

## 2024-10-16 DIAGNOSIS — G47.00 INSOMNIA, UNSPECIFIED TYPE: ICD-10-CM

## 2024-10-16 RX ORDER — HYDROXYZINE HYDROCHLORIDE 25 MG/1
25 TABLET, FILM COATED ORAL NIGHTLY PRN
Qty: 90 TABLET | Refills: 0 | Status: SHIPPED | OUTPATIENT
Start: 2024-10-16

## 2025-01-27 ENCOUNTER — TELEPHONE (OUTPATIENT)
Dept: INTERNAL MEDICINE | Facility: CLINIC | Age: 55
End: 2025-01-27
Payer: COMMERCIAL

## 2025-01-27 ENCOUNTER — PATIENT MESSAGE (OUTPATIENT)
Dept: INTERNAL MEDICINE | Facility: CLINIC | Age: 55
End: 2025-01-27
Payer: COMMERCIAL

## 2025-01-27 DIAGNOSIS — D64.9 ANEMIA, UNSPECIFIED TYPE: Primary | ICD-10-CM

## 2025-01-27 DIAGNOSIS — Z00.00 ANNUAL PHYSICAL EXAM: Primary | ICD-10-CM

## 2025-02-03 ENCOUNTER — LAB VISIT (OUTPATIENT)
Dept: LAB | Facility: HOSPITAL | Age: 55
End: 2025-02-03
Payer: COMMERCIAL

## 2025-02-03 DIAGNOSIS — D64.9 ANEMIA, UNSPECIFIED TYPE: ICD-10-CM

## 2025-02-03 DIAGNOSIS — Z00.00 ANNUAL PHYSICAL EXAM: ICD-10-CM

## 2025-02-03 LAB
ALBUMIN SERPL BCP-MCNC: 3.8 G/DL (ref 3.5–5.2)
ALP SERPL-CCNC: 77 U/L (ref 40–150)
ALT SERPL W/O P-5'-P-CCNC: 20 U/L (ref 10–44)
ANION GAP SERPL CALC-SCNC: 6 MMOL/L (ref 8–16)
AST SERPL-CCNC: 22 U/L (ref 10–40)
BASOPHILS # BLD AUTO: 0.03 K/UL (ref 0–0.2)
BASOPHILS NFR BLD: 0.7 % (ref 0–1.9)
BILIRUB SERPL-MCNC: 1.1 MG/DL (ref 0.1–1)
BUN SERPL-MCNC: 15 MG/DL (ref 6–20)
CALCIUM SERPL-MCNC: 9.2 MG/DL (ref 8.7–10.5)
CHLORIDE SERPL-SCNC: 109 MMOL/L (ref 95–110)
CHOLEST SERPL-MCNC: 160 MG/DL (ref 120–199)
CHOLEST/HDLC SERPL: 2.6 {RATIO} (ref 2–5)
CO2 SERPL-SCNC: 26 MMOL/L (ref 23–29)
CREAT SERPL-MCNC: 0.7 MG/DL (ref 0.5–1.4)
DIFFERENTIAL METHOD BLD: NORMAL
EOSINOPHIL # BLD AUTO: 0.1 K/UL (ref 0–0.5)
EOSINOPHIL NFR BLD: 2 % (ref 0–8)
ERYTHROCYTE [DISTWIDTH] IN BLOOD BY AUTOMATED COUNT: 12.7 % (ref 11.5–14.5)
EST. GFR  (NO RACE VARIABLE): >60 ML/MIN/1.73 M^2
ESTIMATED AVG GLUCOSE: 108 MG/DL (ref 68–131)
FERRITIN SERPL-MCNC: 75 NG/ML (ref 20–300)
GLUCOSE SERPL-MCNC: 97 MG/DL (ref 70–110)
HBA1C MFR BLD: 5.4 % (ref 4–5.6)
HCT VFR BLD AUTO: 38.5 % (ref 37–48.5)
HDLC SERPL-MCNC: 61 MG/DL (ref 40–75)
HDLC SERPL: 38.1 % (ref 20–50)
HGB BLD-MCNC: 12.6 G/DL (ref 12–16)
IMM GRANULOCYTES # BLD AUTO: 0.01 K/UL (ref 0–0.04)
IMM GRANULOCYTES NFR BLD AUTO: 0.2 % (ref 0–0.5)
IRON SERPL-MCNC: 148 UG/DL (ref 30–160)
LDLC SERPL CALC-MCNC: 84 MG/DL (ref 63–159)
LYMPHOCYTES # BLD AUTO: 1.4 K/UL (ref 1–4.8)
LYMPHOCYTES NFR BLD: 30.4 % (ref 18–48)
MCH RBC QN AUTO: 29.1 PG (ref 27–31)
MCHC RBC AUTO-ENTMCNC: 32.7 G/DL (ref 32–36)
MCV RBC AUTO: 89 FL (ref 82–98)
MONOCYTES # BLD AUTO: 0.3 K/UL (ref 0.3–1)
MONOCYTES NFR BLD: 7.4 % (ref 4–15)
NEUTROPHILS # BLD AUTO: 2.6 K/UL (ref 1.8–7.7)
NEUTROPHILS NFR BLD: 59.3 % (ref 38–73)
NONHDLC SERPL-MCNC: 99 MG/DL
NRBC BLD-RTO: 0 /100 WBC
PLATELET # BLD AUTO: 179 K/UL (ref 150–450)
PMV BLD AUTO: 10.9 FL (ref 9.2–12.9)
POTASSIUM SERPL-SCNC: 4.7 MMOL/L (ref 3.5–5.1)
PROT SERPL-MCNC: 6.3 G/DL (ref 6–8.4)
RBC # BLD AUTO: 4.33 M/UL (ref 4–5.4)
SATURATED IRON: 44 % (ref 20–50)
SODIUM SERPL-SCNC: 141 MMOL/L (ref 136–145)
TOTAL IRON BINDING CAPACITY: 337 UG/DL (ref 250–450)
TRANSFERRIN SERPL-MCNC: 228 MG/DL (ref 200–375)
TRIGL SERPL-MCNC: 75 MG/DL (ref 30–150)
TSH SERPL DL<=0.005 MIU/L-ACNC: 0.86 UIU/ML (ref 0.4–4)
WBC # BLD AUTO: 4.44 K/UL (ref 3.9–12.7)

## 2025-02-03 PROCEDURE — 85025 COMPLETE CBC W/AUTO DIFF WBC: CPT | Performed by: NURSE PRACTITIONER

## 2025-02-03 PROCEDURE — 82607 VITAMIN B-12: CPT | Performed by: NURSE PRACTITIONER

## 2025-02-03 PROCEDURE — 84443 ASSAY THYROID STIM HORMONE: CPT | Performed by: NURSE PRACTITIONER

## 2025-02-03 PROCEDURE — 80061 LIPID PANEL: CPT | Performed by: NURSE PRACTITIONER

## 2025-02-03 PROCEDURE — 84466 ASSAY OF TRANSFERRIN: CPT | Performed by: NURSE PRACTITIONER

## 2025-02-03 PROCEDURE — 80053 COMPREHEN METABOLIC PANEL: CPT | Performed by: NURSE PRACTITIONER

## 2025-02-03 PROCEDURE — 82728 ASSAY OF FERRITIN: CPT | Performed by: NURSE PRACTITIONER

## 2025-02-03 PROCEDURE — 36415 COLL VENOUS BLD VENIPUNCTURE: CPT | Mod: PO | Performed by: NURSE PRACTITIONER

## 2025-02-03 PROCEDURE — 83036 HEMOGLOBIN GLYCOSYLATED A1C: CPT | Performed by: NURSE PRACTITIONER

## 2025-02-04 LAB — VIT B12 SERPL-MCNC: 727 NG/L (ref 180–914)

## 2025-02-06 ENCOUNTER — OFFICE VISIT (OUTPATIENT)
Dept: INTERNAL MEDICINE | Facility: CLINIC | Age: 55
End: 2025-02-06
Payer: COMMERCIAL

## 2025-02-06 VITALS
HEIGHT: 64 IN | SYSTOLIC BLOOD PRESSURE: 100 MMHG | OXYGEN SATURATION: 100 % | DIASTOLIC BLOOD PRESSURE: 72 MMHG | WEIGHT: 159.38 LBS | HEART RATE: 74 BPM | BODY MASS INDEX: 27.21 KG/M2

## 2025-02-06 DIAGNOSIS — G47.00 INSOMNIA, UNSPECIFIED TYPE: ICD-10-CM

## 2025-02-06 DIAGNOSIS — G44.86 CERVICOGENIC HEADACHE: ICD-10-CM

## 2025-02-06 DIAGNOSIS — M06.9 RHEUMATOID ARTHRITIS, INVOLVING UNSPECIFIED SITE, UNSPECIFIED WHETHER RHEUMATOID FACTOR PRESENT: ICD-10-CM

## 2025-02-06 DIAGNOSIS — G43.009 MIGRAINE WITHOUT AURA AND WITHOUT STATUS MIGRAINOSUS, NOT INTRACTABLE: ICD-10-CM

## 2025-02-06 DIAGNOSIS — N60.12 DIFFUSE CYSTIC MASTOPATHY OF BOTH BREASTS: ICD-10-CM

## 2025-02-06 DIAGNOSIS — N60.11 DIFFUSE CYSTIC MASTOPATHY OF BOTH BREASTS: ICD-10-CM

## 2025-02-06 DIAGNOSIS — Z00.00 ANNUAL PHYSICAL EXAM: Primary | ICD-10-CM

## 2025-02-06 PROCEDURE — 99396 PREV VISIT EST AGE 40-64: CPT | Mod: S$GLB,,, | Performed by: NURSE PRACTITIONER

## 2025-02-06 PROCEDURE — 99999 PR PBB SHADOW E&M-EST. PATIENT-LVL IV: CPT | Mod: PBBFAC,,, | Performed by: NURSE PRACTITIONER

## 2025-02-06 RX ORDER — TRAZODONE HYDROCHLORIDE 50 MG/1
50 TABLET ORAL NIGHTLY
Qty: 30 TABLET | Refills: 11 | Status: SHIPPED | OUTPATIENT
Start: 2025-02-06 | End: 2026-02-06

## 2025-02-06 NOTE — PROGRESS NOTES
Internal Medicine Annual Exam       CHIEF COMPLAINT     The patient, Chaya Dee, who is a 55 y.o. female  with OA of the lumbar spine, spondylosis, DDD of the lumbar spine presents for an annual exam.    HPI     History of Present Illness    CHIEF COMPLAINT:  Patient presents today for annual follow-up    MUSCULOSKELETAL:  She underwent right rotator cuff surgery in March 2024 by Dr. Phil Bryan and has developed frozen shoulder. She is currently taking a break from physical therapy. She was evaluated by Dr. Joy at [facility] for neck and back pain. She underwent four unsuccessful injection procedures between May and November 2023 at [facility], [place]. Last MRIs of neck and back were performed in December 2022.    MIGRAINES:  She experiences headaches at least 3 times per week. She currently takes Emgality for prevention and Maxalt for acute relief. She has a follow-up visit with Dr. Goncalves in May and is considering requesting to restart Topamax due to headache frequency.    SLEEP:  She reports difficulty maintaining sleep with hourly wake-ups throughout the night. Hydroxyzine was initially effective for sleep issues and associated panic attacks but has become less effective over time.    WEIGHT MANAGEMENT:  She reports gaining 10 lbs in one month with subsequent 3-pound loss. She notes her pants fit tighter than usual and expresses uncertainty about whether the rapid weight gain is related to menopause.    MENOPAUSAL SYMPTOMS:  She experiences occasional mild, brief hot flashes that quickly subside, which are less intense compared to earlier in her menopausal transition. She reports overall mild menopausal symptoms.    FAMILY HISTORY:  Her mother has diabetes and atrial fibrillation. She has no siblings. No paternal medical history is reported.    DDD of the lumbar spine, sacroiliac dysfunction - had trigger point injection with Dr Reich, not effective. S/p ganglion impar block.      Migraines-  emgality 120 mg  SQ monthly  Has weaned off topamax - having headaches 3x/week   for acute migraines - maxalt 10 mg mlt   Followed by neurology - has follow up scheduled      RA- followed by rheumatology. Weaned off plaquenil 5/2022     HM-  PAP- 5/29/2023  CRCS- had c-scope 2016 - repeat 2026   MMG-5/2024  Flu-UTD       ROS:  General: -fever, -chills, -fatigue, +weight gain, -weight loss  Eyes: -vision changes, -redness, -discharge  ENT: -ear pain, -nasal congestion, -sore throat  Cardiovascular: -chest pain, -palpitations, -lower extremity edema  Respiratory: -cough, -shortness of breath  Gastrointestinal: -abdominal pain, -nausea, -vomiting, -diarrhea, -constipation, -blood in stool  Genitourinary: -dysuria, -hematuria, -frequency  Musculoskeletal: -joint pain, -muscle pain, +neck pain  Skin: -rash, -lesion  Neurological: +headache, -dizziness, -numbness, -tingling  Psychiatric: -anxiety, -depression, +sleep difficulty           Past Medical History:  Past Medical History:   Diagnosis Date    Allergy     Chronic allergic rhinitis 3/19/2016    Endometriosis     Laryngopharyngeal reflux disease 03/2018    PONV (postoperative nausea and vomiting)     Recurrent upper respiratory infection (URI)        Past Surgical History:   Procedure Laterality Date    ABDOMINAL SURGERY      endometrosis    ADENOIDECTOMY      INJECTION OF ANESTHETIC AGENT AROUND NERVE Bilateral 8/28/2023    Procedure: BLOCK, NERVE, BILATERAL L3-L4-L5 MEDIAL BRANCH SOONER DATE;  Surgeon: Andra Reich MD;  Location: [facility], [place], operated by [facility];  Service: Pain Management;  Laterality: Bilateral;    INJECTION OF ANESTHETIC AGENT AROUND NERVE N/A 11/13/2023    Procedure: BLOCK, GANGLION IMPAR;  Surgeon: nAdra Reich MD;  Location: [facility], [place], operated by [facility];  Service: Pain Management;  Laterality: N/A;    INJECTION OF FACET JOINT Right 5/11/2023    Procedure: INJECTION, FACET JOINT RIGHT L5/S1;  Surgeon: Andra Reich MD;  Location: [facility], [place], operated by [facility];  Service: Pain Management;  Laterality: Right;     INJECTION, SACROILIAC JOINT Right 10/5/2023    Procedure: INJECTION,SACROILIAC JOINT, RIGHT  SOONER DATE;  Surgeon: Andra Reich MD;  Location: [facility];  Service: Pain Management;  Laterality: Right;    NASAL SEPTUM SURGERY      SHOULDER SURGERY      SINUS SURGERY      TONSILLECTOMY          Family History   Problem Relation Name Age of Onset    Thyroid disease Sister      Allergic rhinitis Mother      Thyroid disease Maternal Aunt      Thyroid disease Maternal Uncle      Diabetes Maternal Grandmother Hillary Davalos         late in life    Heart disease Maternal Grandmother Hillary Davalos         Congestive heart failure    Stroke Paternal Grandfather Vincenzo Escudero          of stroke    Cancer Paternal Aunt Demetria Escudero         uterine        Social History     Socioeconomic History    Marital status:    Tobacco Use    Smoking status: Never    Smokeless tobacco: Never   Substance and Sexual Activity    Alcohol use: Yes     Alcohol/week: 2.0 standard drinks of alcohol     Types: 2 Glasses of wine per week     Comment: socially    Drug use: No    Sexual activity: Not Currently     Partners: Male     Birth control/protection: Other-see comments, None     Comment: No longer taking.  In menopause.     Social Drivers of Health     Financial Resource Strain: Low Risk  (2024)    Received from [facility]    Overall Financial Resource Strain (CARDIA)     Difficulty of Paying Living Expenses: Not hard at all   Food Insecurity: No Food Insecurity (2024)    Received from [facility]    Hunger Vital Sign     Worried About Running Out of Food in the Last Year: Never true     Ran Out of Food in the Last Year: Never true   Transportation Needs: No Transportation Needs (2024)    Received from [facility]    PRAPARE - Transportation     Lack of Transportation (Medical): No     Lack of Transportation (Non-Medical): No   Physical Activity: Inactive (2024)    Received from [facility]    Exercise  Vital Sign     Days of Exercise per Week: 0 days     Minutes of Exercise per Session: 0 min   Stress: Stress Concern Present (8/7/2024)    [facility]    [facility] of Occupational [facility] - Occupational Stress Questionnaire     Feeling of Stress : Rather much   Housing Stability: Low Risk  (1/23/2024)    Housing Stability Vital Sign     Unable to Pay for Housing in the Last Year: No     Number of Places Lived in the Last Year: 1     Unstable Housing in the Last Year: No        Social History     Tobacco Use   Smoking Status Never   Smokeless Tobacco Never        Allergies as of 02/06/2025 - Reviewed 02/06/2025   Allergen Reaction Noted    Latex Other (See Comments) 01/24/2024          Home Medications:  Prior to Admission medications    Medication Sig Start Date End Date Taking? Authorizing Provider   celecoxib (CELEBREX) 200 MG capsule Take 200 mg by mouth once daily. 12/20/23  Yes Provider, Historical   cholecalciferol, vitamin D3, 125 mcg (5,000 unit) Tab Take by mouth.   Yes Provider, Historical   galcanezumab-gnlm 120 mg/mL PnIj Inject 1 mL (120 mg total) into the skin every 28 days. Begin 28 days after loading dose. 3/6/24  Yes Tess Haas FNP   hydrOXYzine HCL (ATARAX) 25 MG tablet TAKE 1 TABLET BY MOUTH NIGHTLY AS NEEDED FOR ITCHING. 10/16/24  Yes Didi Grewal NP   rizatriptan (MAXALT-MLT) 10 MG disintegrating tablet Take 1 tablet (10 mg total) by mouth every 2 (two) hours as needed for Migraine. Max 30 mg/day. 3/6/24  Yes Tess Haas FNP   topiramate (TOPAMAX) 25 MG tablet TAKE 1 TABLET BY MOUTH TWICE A DAY  Patient not taking: Reported on 2/6/2025 4/2/24   Tess Haas FNP   baclofen (LIORESAL) 10 MG tablet Take 1 tablet (10 mg total) by mouth every 8 (eight) hours as needed (muscle spasm). FOR MUSCLE SPASM 3/6/24 2/6/25  Tess Haas FNP   FLONASE ALLERGY RELIEF 50 mcg/actuation nasal spray 1 spray by Each Nostril route. 1/18/24 2/6/25   "Provider, Historical   meloxicam (MOBIC) 15 MG tablet Take 15 mg by mouth once daily. 11/27/23 2/6/25  Provider, Historical       Review of Systems:  Review of Systems   Constitutional:  Positive for unexpected weight change. Negative for activity change.   HENT:  Negative for hearing loss, rhinorrhea and trouble swallowing.    Eyes:  Positive for visual disturbance. Negative for discharge.   Respiratory:  Negative for chest tightness and wheezing.    Cardiovascular:  Negative for chest pain and palpitations.   Gastrointestinal:  Negative for blood in stool, constipation, diarrhea and vomiting.   Endocrine: Positive for polydipsia and polyuria.   Genitourinary:  Negative for difficulty urinating, dysuria, hematuria and menstrual problem.   Musculoskeletal:  Positive for arthralgias, back pain and neck pain (followed by neurosurgeyr - Myra - having MRI). Negative for joint swelling.   Neurological:  Positive for headaches. Negative for weakness.   Psychiatric/Behavioral:  Negative for confusion and dysphoric mood.        Health Maintainence:   Immunizations:  Health Maintenance         Date Due Completion Date    HIV Screening Never done ---    TETANUS VACCINE Never done ---    Pneumococcal Vaccines (Age 50+) (2 of 2 - PCV) 04/02/2019 4/2/2018    Shingles Vaccine (1 of 2) Never done ---    COVID-19 Vaccine (7 - 2024-25 season) 09/01/2024 1/24/2024    Mammogram 05/22/2025 5/22/2024    Override on 2/19/2016: Done    Colorectal Cancer Screening 10/26/2026 10/26/2016    Hemoglobin A1c (Diabetic Prevention Screening) 02/03/2028 2/3/2025    Cervical Cancer Screening 08/08/2029 8/8/2024    Lipid Panel 02/03/2030 2/3/2025    RSV Vaccine (Age 60+ and Pregnant patients) (1 - 1-dose 75+ series) 01/25/2045 ---             PHYSICAL EXAM     /72 (BP Location: Right arm, Patient Position: Sitting)   Pulse 74   Ht 5' 4" (1.626 m)   Wt 72.3 kg (159 lb 6.3 oz)   SpO2 100%   BMI 27.36 kg/m²  Body mass index is 27.36 " kg/m².    Physical Exam  Vitals reviewed.   Constitutional:       Appearance: She is well-developed.   HENT:      Head: Normocephalic.      Right Ear: External ear normal.      Left Ear: External ear normal.      Nose: Nose normal.      Mouth/Throat:      Pharynx: No oropharyngeal exudate.   Eyes:      Pupils: Pupils are equal, round, and reactive to light.   Neck:      Thyroid: No thyromegaly.      Vascular: No JVD.      Trachea: No tracheal deviation.   Cardiovascular:      Rate and Rhythm: Normal rate and regular rhythm.      Heart sounds: Normal heart sounds. No murmur heard.     No friction rub. No gallop.   Pulmonary:      Effort: Pulmonary effort is normal. No respiratory distress.      Breath sounds: Normal breath sounds. No wheezing or rales.   Abdominal:      General: Bowel sounds are normal. There is no distension.      Palpations: Abdomen is soft.      Tenderness: There is no abdominal tenderness.   Musculoskeletal:         General: No tenderness. Normal range of motion.      Cervical back: Neck supple.   Lymphadenopathy:      Cervical: No cervical adenopathy.   Skin:     General: Skin is warm and dry.      Findings: No rash.   Neurological:      Mental Status: She is alert and oriented to person, place, and time.   Psychiatric:         Behavior: Behavior normal.         LABS     Lab Results   Component Value Date    HGBA1C 5.4 02/03/2025     CMP  Sodium   Date Value Ref Range Status   02/03/2025 141 136 - 145 mmol/L Final     Potassium   Date Value Ref Range Status   02/03/2025 4.7 3.5 - 5.1 mmol/L Final     Chloride   Date Value Ref Range Status   02/03/2025 109 95 - 110 mmol/L Final     CO2   Date Value Ref Range Status   02/03/2025 26 23 - 29 mmol/L Final     Glucose   Date Value Ref Range Status   02/03/2025 97 70 - 110 mg/dL Final     BUN   Date Value Ref Range Status   02/03/2025 15 6 - 20 mg/dL Final     Creatinine   Date Value Ref Range Status   02/03/2025 0.7 0.5 - 1.4 mg/dL Final     Calcium    Date Value Ref Range Status   02/03/2025 9.2 8.7 - 10.5 mg/dL Final     Total Protein   Date Value Ref Range Status   02/03/2025 6.3 6.0 - 8.4 g/dL Final     Albumin   Date Value Ref Range Status   02/03/2025 3.8 3.5 - 5.2 g/dL Final     Total Bilirubin   Date Value Ref Range Status   02/03/2025 1.1 (H) 0.1 - 1.0 mg/dL Final     Comment:     For infants and newborns, interpretation of results should be based  on gestational age, weight and in agreement with clinical  observations.    Premature Infant recommended reference ranges:  Up to 24 hours.............<8.0 mg/dL  Up to 48 hours............<12.0 mg/dL  3-5 days..................<15.0 mg/dL  6-29 days.................<15.0 mg/dL       Alkaline Phosphatase   Date Value Ref Range Status   02/03/2025 77 40 - 150 U/L Final     AST   Date Value Ref Range Status   02/03/2025 22 10 - 40 U/L Final     ALT   Date Value Ref Range Status   02/03/2025 20 10 - 44 U/L Final     Anion Gap   Date Value Ref Range Status   02/03/2025 6 (L) 8 - 16 mmol/L Final     eGFR if    Date Value Ref Range Status   04/03/2016 >60.0 >60 mL/min/1.73 m^2 Final     eGFR if non    Date Value Ref Range Status   04/03/2016 >60.0 >60 mL/min/1.73 m^2 Final     Comment:     Calculation used to obtain the estimated glomerular filtration  rate (eGFR) is the CKD-EPI equation. Since race is unknown   in our information system, the eGFR values for   -American and Non--American patients are given   for each creatinine result.       Lab Results   Component Value Date    WBC 4.44 02/03/2025    HGB 12.6 02/03/2025    HCT 38.5 02/03/2025    MCV 89 02/03/2025     02/03/2025     Lab Results   Component Value Date    CHOL 160 02/03/2025    CHOL 177 01/27/2024    CHOL 218 (H) 04/03/2016     Lab Results   Component Value Date    HDL 61 02/03/2025    HDL 64 01/27/2024    HDL 62 04/03/2016     Lab Results   Component Value Date    LDLCALC 84.0 02/03/2025     LDLCALC 91.4 01/27/2024    LDLCALC 133.0 04/03/2016     Lab Results   Component Value Date    TRIG 75 02/03/2025    TRIG 108 01/27/2024    TRIG 115 04/03/2016     Lab Results   Component Value Date    CHOLHDL 38.1 02/03/2025    CHOLHDL 36.2 01/27/2024    CHOLHDL 28.4 04/03/2016     Lab Results   Component Value Date    TSH 0.862 02/03/2025       ASSESSMENT/PLAN     Chaya Dee is a 55 y.o. female    Annual physical exam- reviewed labs and HM     Cervicogenic headache/Migraine without aura and without status migrainosus, not intractable- stable. Will cont current meds and f/u with neurology     Diffuse cystic mastopathy of both breasts- stable. Will cont MMG     Rheumatoid arthritis, involving unspecified site, unspecified whether rheumatoid factor present- stable. Will f/u with rheumatology     Insomnia, unspecified type- poorly controlled, discussed ways to decrease cortisol and stop hydroxyzine and start trazodone   -     traZODone (DESYREL) 50 MG tablet; Take 1 tablet (50 mg total) by mouth every evening.  Dispense: 30 tablet; Refill: 11           Follow up with PCP    Didi BARRERA, APRN, FNP-c   Department of Internal Medicine - [facility]  3:40 PM

## 2025-02-23 ENCOUNTER — OFFICE VISIT (OUTPATIENT)
Dept: URGENT CARE | Facility: CLINIC | Age: 55
End: 2025-02-23
Payer: COMMERCIAL

## 2025-02-23 VITALS
RESPIRATION RATE: 18 BRPM | SYSTOLIC BLOOD PRESSURE: 100 MMHG | BODY MASS INDEX: 27.14 KG/M2 | HEART RATE: 73 BPM | OXYGEN SATURATION: 95 % | WEIGHT: 159 LBS | TEMPERATURE: 98 F | HEIGHT: 64 IN | DIASTOLIC BLOOD PRESSURE: 65 MMHG

## 2025-02-23 DIAGNOSIS — R05.1 ACUTE COUGH: Primary | ICD-10-CM

## 2025-02-23 DIAGNOSIS — J06.9 VIRAL URI WITH COUGH: ICD-10-CM

## 2025-02-23 LAB
CTP QC/QA: YES
CTP QC/QA: YES
POC MOLECULAR INFLUENZA A AGN: NEGATIVE
POC MOLECULAR INFLUENZA B AGN: NEGATIVE
SARS CORONAVIRUS 2 ANTIGEN: NEGATIVE

## 2025-02-23 PROCEDURE — 87811 SARS-COV-2 COVID19 W/OPTIC: CPT | Mod: QW,S$GLB,, | Performed by: FAMILY MEDICINE

## 2025-02-23 PROCEDURE — 87502 INFLUENZA DNA AMP PROBE: CPT | Mod: QW,S$GLB,, | Performed by: FAMILY MEDICINE

## 2025-02-23 PROCEDURE — 99213 OFFICE O/P EST LOW 20 MIN: CPT | Mod: S$GLB,,, | Performed by: FAMILY MEDICINE

## 2025-02-23 NOTE — PATIENT INSTRUCTIONS
General Discharge Instructions   PLEASE READ YOUR DISCHARGE INSTRUCTIONS ENTIRELY AS IT CONTAINS IMPORTANT INFORMATION.  If you were prescribed a narcotic or controlled medication, do not drive or operate heavy equipment or machinery while taking these medications.  If you were prescribed antibiotics, please take them to completion.  You must understand that you've received an Urgent Care treatment only and that you may be released before all your medical problems are known or treated. You, the patient, will arrange for follow up care as instructed.    OVER THE COUNTER RECOMMENDATIONS/SUGGESTIONS.    Make sure to stay well hydrated.    Use Nasal Saline to mechanically move any post nasal drip from your eustachian tube or from the back of your throat.    Use warm salt water gargles to ease your throat pain. Warm salt water gargles as needed for sore throat- 1/2 tsp salt to 1 cup warm water, gargle as desired.    Use an antihistamine such as Claritin, Zyrtec or Allegra to dry you out.    Use pseudoephedrine (behind the counter) to decongest. Pseudoephedrine 30 mg up to 240 mg /day. It can raise your blood pressure and give you palpitations.    Use mucinex (guaifenesin) to break up mucous up to 2400mg/day to loosen any mucous.    The mucinex DM pill has a cough suppressant that can be sedating. It can be used at night to stop the tickle at the back of your throat.    You can use Mucinex D (it has guaifenesin and a high dose of pseudoephedrine) in the mornings to help decongest.    Use Afrin in each nare for no longer than 3 days, as it is addictive. It can also dry out your mucous membranes and cause elevated blood pressure. This is especially useful if you are flying.    Use Flonase 1-2 sprays/nostril per day. It is a local acting steroid nasal spray, if you develop a bloody nose, stop using the medication immediately.    Sometimes Nyquil at night is beneficial to help you get some rest, however it is sedating and it  does have an antihistamine, and tylenol.    Honey is a natural cough suppressant that can be used.    Tylenol up to 4,000 mg a day is safe for short periods and can be used for body aches, pain, and fever. However in high doses and prolonged use it can cause liver irritation.    Ibuprofen is a non-steroidal anti-inflammatory that can be used for body aches, pain, and fever.However it can also cause stomach irritation if over used.     Follow up with your PCP or specialty clinic as instructed in the next 2-3 days if not improved or as needed. You can call [phone] to schedule an appointment with appropriate provider.      If you condition worsens, we recommend that you receive another evaluation at the emergency room immediately or contact your primary medical clinic's after hours call service to discuss your concerns.      Please return here or go to the Emergency Department for any concerns or worsening condition.   You can also call [phone] to schedule an appointment with the appropriate provider.    Please return here or go to the Emergency Department for any concerns or worsening of condition.    Thank you for choosing [facility] [place]!    Our goal in the Urgent Care is to always provide outstanding medical care. You may receive a survey by mail or e-mail in the next week regarding your experience today. We would greatly appreciate you completing and returning the survey. Your feedback provides us with a way to recognize our staff who provide very good care, and it helps us learn how to improve when your experience was below our aspiration of excellence.      We appreciate you trusting us with your medical care. We hope you feel better soon. We will be happy to take care of you for all of your future medical needs.    Sincerely,    MARTINEZ Allred  Patient Instructions   PLEASE READ YOUR DISCHARGE INSTRUCTIONS ENTIRELY AS IT CONTAINS IMPORTANT INFORMATION.        Please drink plenty of  "fluids. You body needs increased water but other beverages may aid in comfort.  You will know that you have had enough water to be hydrated when your urine is clear or at least a very pale yellow. Nasal saline may help with removal of mucus as well.  Ibuprofen is preferred for aches and pains as well as fever reduction. If you do not have high blood pressure, then you may use a decongestant such as pseudoephedrine or one of the above medications that have the letter, "-D" following it.  Hot tea with honey can help with sore throats as the heat with reduce the inflammation and the honey will coat your throat to help it feel better. Prescription pain medicine should be used for the significant throat sxs you are experiencing. Do not drive after taking this medication.     Lastly, good hand washing and cough hygiene (cough into your elbow) will help prevent the spread of the illness.  A general rule is that you are no longer contagious once you have been without a fever for over 24 hours without requiring fever reducing medications.   Please get plenty of rest.     Please return here or go to the Emergency Department for any concerns or worsening of condition.     Please take an over the counter antihistamine medication (allegra/Claritin/Zyrtec) of your choice as directed, they may help with some of the runny nose symptoms if you are having them.       Can continue mucinex. Use mucinex (guaifenisin) to break up mucous up to 2400mg/day to loosen any mucous. The mucinex DM pill has a cough suppressant that can be used at night to stop the tickle at the back of your throat. You may use Mucinex to help thin thick secretions to allow you to expel them but it only works if you drink more water.     If not allergic, please take over the counter Tylenol (Acetaminophen) and/or Motrin (Ibuprofen) as directed for control of pain and/or fever.  Please follow up with your primary care doctor or specialist as needed.     Sore throat " recommendations: Warm fluids, warm salt water gargles, throat lozenges, tea, honey, soup, rest, hydration.     Use over the counter flonase: one spray each nostril twice daily OR two sprays each nostril once daily.      Sinus rinses DO NOT USE TAP WATER, if you must, water must be a rolling boil for 1 minute, let it cool, then use.  May use distilled water, or over the counter nasal saline rinses.  Vics vapor rub in shower to help open nasal passages.  May use nasal gel to keep passages moisturized.  May use Nasal saline sprays during the day for added relief of congestion.   For those who go to the gym, please do not use the sauna or steam room now to clear sinuses.     If you  smoke, please stop smoking.        Please return or see your primary care doctor if you develop new or worsening symptoms.      Please arrange follow up with your primary medical clinic as soon as possible. You must understand that you've received an Urgent Care treatment only and that you may be released before all of your medical problems are known or treated. You, the patient, will arrange for follow up as instructed. If your symptoms worsen or fail to improve you should go to the Emergency Room.

## 2025-02-23 NOTE — PROGRESS NOTES
"Subjective:      Patient ID: Chaya Dee is a 55 y.o. female.    Vitals:  height is 5' 4" (1.626 m) and weight is 72.1 kg (159 lb). Her oral temperature is 98.3 °F (36.8 °C). Her blood pressure is 100/65 and her pulse is 73. Her respiration is 18 and oxygen saturation is 95%.     Chief Complaint: Cough    Pt says she has had cough ,congestion and fever since yesterday . Pt took OTC meds  with no relief. Pt states her  has Flu A, she says temp 99, she says for the past week she had a dry cough and says she would notice pink tinged sputum. She says sore throat and body aches are the worse, denies any weight loss. Decreased appetite.     Cough  This is a new problem. The current episode started yesterday. The problem has been unchanged. The problem occurs constantly. The cough is Productive of sputum. Associated symptoms include a fever, headaches, myalgias, nasal congestion and postnasal drip. Pertinent negatives include no chest pain, chills, ear congestion, ear pain, heartburn, hemoptysis, rash, rhinorrhea, sore throat, shortness of breath, sweats, weight loss or wheezing. She has tried OTC cough suppressant for the symptoms. The treatment provided no relief. Her past medical history is significant for pneumonia (walking pna years ago). There is no history of asthma.       Constitution: Positive for activity change, appetite change, fatigue and fever. Negative for chills, sweating, unexpected weight change and generalized weakness.   HENT:  Positive for congestion and postnasal drip. Negative for ear pain, nosebleeds, foreign body in nose, sinus pain, sinus pressure and sore throat.    Neck: Negative for neck pain and neck stiffness.   Cardiovascular:  Negative for chest pain.   Respiratory:  Positive for cough. Negative for chest tightness, sputum production, bloody sputum, COPD, shortness of breath, stridor and wheezing.    Gastrointestinal:  Negative for heartburn.   Musculoskeletal:  Positive for " muscle ache.   Skin:  Negative for rash.   Neurological:  Positive for headaches.      Objective:     Physical Exam   Constitutional: She is oriented to person, place, and time. She appears well-developed.  Non-toxic appearance. She does not appear ill. No distress.   HENT:   Head: Normocephalic and atraumatic.   Ears:   Right Ear: External ear normal.   Left Ear: External ear normal.   Nose: Nose normal.   Mouth/Throat: Oropharynx is clear and moist.   Eyes: Conjunctivae, EOM and lids are normal. Pupils are equal, round, and reactive to light.   Neck: Trachea normal and phonation normal. Neck supple.   Cardiovascular: Normal rate.   Pulmonary/Chest: Effort normal and breath sounds normal.   Musculoskeletal: Normal range of motion.         General: Normal range of motion.   Neurological: She is alert and oriented to person, place, and time.   Skin: Skin is warm, dry, intact and not diaphoretic.   Psychiatric: Her speech is normal and behavior is normal. Judgment and thought content normal.   Nursing note and vitals reviewed.    Results for orders placed or performed in visit on 02/23/25   POCT Influenza A/B MOLECULAR    Collection Time: 02/23/25  9:55 AM   Result Value Ref Range    POC Molecular Influenza A Ag Negative Negative    POC Molecular Influenza B Ag Negative Negative     Acceptable Yes    SARS Coronavirus 2 Antigen, POCT Manual Read    Collection Time: 02/23/25 10:16 AM   Result Value Ref Range    SARS Coronavirus 2 Antigen Negative Negative, Presumptive Negative     Acceptable Yes       Assessment:     1. Acute cough    2. Viral URI with cough        Plan:     COVID and flu negative, vital signs stable, lungs clear to auscultation bilaterally.  Over-the-counter medications reviewed, offered Tessalon Perles and promethazine, she declined said she will do the Mucinex, Zyrtec and Delsym.    Discussed results/diagnosis/plan with patient in clinic. Strict precautions given to  patient to monitor for worsening signs and symptoms. Advised to follow up with PCP or specialist.    Explained side effects of medications prescribed with patient and informed him/her to discontinue use if he/she has any side effects and to inform UC or PCP if this occurs. All questions answered. Strict ED verses clinic return precautions stressed and given in depth. Advised if symptoms worsens of fail to improve he/she should go to the Emergency Room. Discharge and follow-up instructions given verbally/printed with the patient who expressed understanding and willingness to comply with my recommendations. Patient voiced understanding and in agreement with current treatment plan. Patient exits the exam room in no acute distress. Conversant and engaged during discharge discussion, verbalized understanding.      Acute cough  -     POCT Influenza A/B MOLECULAR  -     SARS Coronavirus 2 Antigen, POCT Manual Read    Viral URI with cough             Additional MDM:     Heart Failure Score:   COPD = No

## 2025-02-28 DIAGNOSIS — G43.009 MIGRAINE WITHOUT AURA AND WITHOUT STATUS MIGRAINOSUS, NOT INTRACTABLE: ICD-10-CM

## 2025-03-10 RX ORDER — GALCANEZUMAB 120 MG/ML
120 INJECTION, SOLUTION SUBCUTANEOUS
Qty: 1 ML | Refills: 11 | Status: SHIPPED | OUTPATIENT
Start: 2025-03-10

## 2025-03-26 ENCOUNTER — TELEPHONE (OUTPATIENT)
Facility: CLINIC | Age: 55
End: 2025-03-26
Payer: COMMERCIAL

## 2025-03-26 NOTE — TELEPHONE ENCOUNTER
Spoke to Pt about rescheduling her appt. Pt's appt will be rescheduled from May 7 to April 2 at 11 am.

## 2025-04-02 ENCOUNTER — OFFICE VISIT (OUTPATIENT)
Facility: CLINIC | Age: 55
End: 2025-04-02
Payer: COMMERCIAL

## 2025-04-02 VITALS
WEIGHT: 159.19 LBS | SYSTOLIC BLOOD PRESSURE: 117 MMHG | BODY MASS INDEX: 27.32 KG/M2 | HEART RATE: 72 BPM | DIASTOLIC BLOOD PRESSURE: 76 MMHG

## 2025-04-02 DIAGNOSIS — G43.009 MIGRAINE WITHOUT AURA AND WITHOUT STATUS MIGRAINOSUS, NOT INTRACTABLE: Primary | ICD-10-CM

## 2025-04-02 PROCEDURE — 99999 PR PBB SHADOW E&M-EST. PATIENT-LVL III: CPT | Mod: PBBFAC,,, | Performed by: NURSE PRACTITIONER

## 2025-04-02 PROCEDURE — 99214 OFFICE O/P EST MOD 30 MIN: CPT | Mod: S$GLB,,, | Performed by: NURSE PRACTITIONER

## 2025-04-02 PROCEDURE — G2211 COMPLEX E/M VISIT ADD ON: HCPCS | Mod: S$GLB,,, | Performed by: NURSE PRACTITIONER

## 2025-04-02 RX ORDER — TOPIRAMATE 50 MG/1
50 TABLET, FILM COATED ORAL 2 TIMES DAILY
Qty: 180 TABLET | Refills: 1 | Status: SHIPPED | OUTPATIENT
Start: 2025-04-02

## 2025-04-02 RX ORDER — RIZATRIPTAN BENZOATE 10 MG/1
10 TABLET, ORALLY DISINTEGRATING ORAL
Qty: 12 TABLET | Refills: 5 | Status: SHIPPED | OUTPATIENT
Start: 2025-04-02

## 2025-04-02 NOTE — PROGRESS NOTES
Established Patient   SUBJECTIVE:  Patient ID: Chaya Dee   Chief Complaint: Follow-up    History of Present Illness:  Chaya Dee is a 55 y.o. female who presents to clinic alone for follow-up of headaches.     Recommendations made at last Office Visit on 3/6/2024:  - For migraine prevention - continue emgality 120 mg SQ monthly   - Will begin tapering process on topiramate - 25 mg AM/50 mg PM x 2 weeks, then 25 mg bid x 2 weeks, then 25 mg qhs x 2 weeks, stop   - for myofascial pain - baclofen 10 mg prn   - For acute migraines - maxalt 10 mg mlt   - Continue tracking headaches   - Send update via patient portal in one month or should headaches begin to worsen as she tapers topiramate   - RTC in 6 months or sooner if needed     04/02/2025 - Interval History:  Daily headaches for the last 2-3 months, last month being the worst, had a migraine associated with vomiting.  Prior to 2-3 months ago, migraines had been very well controlled.  She is not able to identify any trigger or cause for the worsening of her migraines.  She has continued emgality 120 mg SQ monthly for migraine prevention.  Successfully tapered off topiramate, and migraines remained stable for months.  At present, she would like to consider restarting topiramate as she feels migraines were much better controlled with combo topiramate/emgality.     CURRENT REGIMEN:  PPX - emgality  Abortive - maxalt 10 mg mlt     Otherwise, information below is still accurate and current.     03/06/2024 - Interval History:  Migrianes have been under great control, has only had 3 migraines since last visit (9 months ago).      She has continued emgality 120 mg SQ monthly and topiramate 50 mg bid for migraine prevention, would be interested in beginning to taper topiramate.      Otherwise, information below is still accurate and current.      06/23/2023 - Interval History:  Only two headaches since her last visit 3 months ago!  Emgality has been working  ""great", migraines only lasted a couple hours in duration.  She remains very pleased with the current state of her migraines, does not wish to make adjustments to her treatment plan.     Otherwise, information below is still accurate and current.      03/10/2023 - Interval History:  Has completed loading dose emgality and one maintenance dose of emgality, due for second maintenance dose in two days.  Since starting Emgality, migraines have greatly improved, went from having a daily headache down to only 5 headache days per month.  Headaches are lasting only an hour in duration.  Feels headaches are "totally manageable".    Completed healthy back and neck physical therapy two days ago, has f/up appt later today with pain medicine to decide how to proceed as far as procedures/injections/nerve blocks etc.       Otherwise, information below is still accurate and current.      12/28/2022 - Interval History:  She has continued suffering with near daily headaches which are present all day, everyday.  Having two different types of headaches, one of which is throbbing, one sided.  Feels headaches are manageable at this point and not interfering with her ability to continue day to day activities.  She is currently taking topiramate 50 mg bid for headache prevention, she does feel this has helped to decrease the intensity of her headaches.  Taking tizanidine 2-4 mg only on occasion.  Has tried flexeril in the past which she did not feel provided her with any relief.  She is enrolled in healthy back program, has been going to physical therapy routinely without any noticeable improvement.   Continues to feel her neck is playing a large role in her headaches, will do stretches recommended by PT which helps for a short while before she feels her neck/upper back muscles begin to start tightening back up.      Otherwise, information below is still accurate and current.      09/21/2022 - Interval History:  She again attempted to " taper topiramate beginning the day after her last visit, noticed she began to experience more frequent headaches and migraines became more intense so went back to taking 50 mg nightly (has been back on 50 mg nightly dose for over 2 months now).    Currently suffering with a headache on average 3 days per week, the majority of which come on in the afternoon, before dinnertime, maybe around 2 pm.  If she treats with maxalt 10 mg mlt migraine will last a couple of hours, otherwise will last the remainder of the day, occasionally will carry into the following day.    Headaches began to occur more frequently around July.    Stops drinking caffeine around 10:30 am, drinks at least 2 cups of coffee before this.       Sleep is poor - has trouble falling asleep and staying asleep. On average sleeps 4 hours per night. Does not feel rested upon waking.  Once she gets into the bed feels her mind will start to race, has a hard time turning her mind off.      Otherwise, information below is still accurate and current.      06/20/2022 - Interval History:  Tapered topiramate to 25 mg nightly which she had been taking nightly from end of March until a few weeks ago.  Noticed she was starting to experience more frequent migraines and subsequently decided to increase topiramate back to 50 mg nightly.  Since then, migraines have been back under control.  She continues to treat acute migraines with rizatriptan.  She would like to eventually get off topiramate.       Otherwise, information below is still accurate and current.      03/30/2022 - Interval History:  Migraines have been much improved since starting topiramate.  Currently suffering with only one migraine per month each of which lasts about 1 day.  She is taking topiramate 50 mg nightly for migraine prevention.  Only rarely takes maxalt for acute migraines.  She is very pleased with the improvement in her migraines.  Would like to attempt to taper off topiramate.      "  Otherwise, information below is still accurate and current.      History of Present Illness:   51 y.o. female with chronic allergic rhinitis, headaches and rheumatoid arthritis, who presents to clinic alone for evaluation of headaches.  Long history of sinus issues, with chronic daily headaches/facial pain.  Reports once per week she experiences unbearable pain which persists all day in duration.  Headaches are described as a moderate to severe, pressure and pulsating pain "like I'm wearing pain goggles" located bilateral cheeks, forehead, and temporalis bilaterally.  Headaches can last anywhere from an hour up to all day in duration.   Pain can be rated anywhere from 3 to 8 out of 10, intensifying to peak over more than an hour.  Made worse by physical movement.  Associated symptoms include congestion, photophobia, nausea (only "once in a blue moon").  She does have a history of migraines in her past, which were hormonally driven, migraines resolved after she completed hormone therapy.  She has not been able to identify any trigger.  On days when headaches are worse, she is able to continue working however finds it more difficult to concentrate.       Treatments Tried and Response  Sumatriptan   Amitriptyline -   Gabapentin 200 mg qhs - for RA  celebrex - for RA  topiramate - helping   maxalt 10 mg mlt - helps   Baclofen -   Emgality -   Topiramate - restarted today     Current Medications:  Current Medications[1]    Review of Systems - A review of 10+ systems was conducted with pertinent positive and negative findings documented in HPI with all other systems reviewed and negative.    PFSH: Past medical, family, and social history reviewed as documented in chart with pertinent positive medical, family, and social history detailed in HPI.    OBJECTIVE:  Vitals:  /76 (Patient Position: Sitting)   Pulse 72   Wt 72.2 kg (159 lb 2.8 oz)   BMI 27.32 kg/m²      Physical Exam:  Constitutional: she appears " well-developed and well-nourished. she is well groomed. NAD     Review of Data:   Notes from internal medicine, ophthalmology (via care everywhere) reviewed   Labs:  Office Visit on 02/23/2025   Component Date Value Ref Range Status    POC Molecular Influenza A Ag 02/23/2025 Negative  Negative Final    POC Molecular Influenza B Ag 02/23/2025 Negative  Negative Final     Acceptable 02/23/2025 Yes   Final    SARS Coronavirus 2 Antigen 02/23/2025 Negative  Negative, Presumptive Negative Final     Acceptable 02/23/2025 Yes   Final   Lab Visit on 02/03/2025   Component Date Value Ref Range Status    WBC 02/03/2025 4.44  3.90 - 12.70 K/uL Final    RBC 02/03/2025 4.33  4.00 - 5.40 M/uL Final    Hemoglobin 02/03/2025 12.6  12.0 - 16.0 g/dL Final    Hematocrit 02/03/2025 38.5  37.0 - 48.5 % Final    MCV 02/03/2025 89  82 - 98 fL Final    MCH 02/03/2025 29.1  27.0 - 31.0 pg Final    MCHC 02/03/2025 32.7  32.0 - 36.0 g/dL Final    RDW 02/03/2025 12.7  11.5 - 14.5 % Final    Platelets 02/03/2025 179  150 - 450 K/uL Final    MPV 02/03/2025 10.9  9.2 - 12.9 fL Final    Immature Granulocytes 02/03/2025 0.2  0.0 - 0.5 % Final    Gran # (ANC) 02/03/2025 2.6  1.8 - 7.7 K/uL Final    Immature Grans (Abs) 02/03/2025 0.01  0.00 - 0.04 K/uL Final    Lymph # 02/03/2025 1.4  1.0 - 4.8 K/uL Final    Mono # 02/03/2025 0.3  0.3 - 1.0 K/uL Final    Eos # 02/03/2025 0.1  0.0 - 0.5 K/uL Final    Baso # 02/03/2025 0.03  0.00 - 0.20 K/uL Final    nRBC 02/03/2025 0  0 /100 WBC Final    Gran % 02/03/2025 59.3  38.0 - 73.0 % Final    Lymph % 02/03/2025 30.4  18.0 - 48.0 % Final    Mono % 02/03/2025 7.4  4.0 - 15.0 % Final    Eosinophil % 02/03/2025 2.0  0.0 - 8.0 % Final    Basophil % 02/03/2025 0.7  0.0 - 1.9 % Final    Differential Method 02/03/2025 Automated   Final    Sodium 02/03/2025 141  136 - 145 mmol/L Final    Potassium 02/03/2025 4.7  3.5 - 5.1 mmol/L Final    Chloride 02/03/2025 109  95 - 110 mmol/L Final     CO2 02/03/2025 26  23 - 29 mmol/L Final    Glucose 02/03/2025 97  70 - 110 mg/dL Final    BUN 02/03/2025 15  6 - 20 mg/dL Final    Creatinine 02/03/2025 0.7  0.5 - 1.4 mg/dL Final    Calcium 02/03/2025 9.2  8.7 - 10.5 mg/dL Final    Total Protein 02/03/2025 6.3  6.0 - 8.4 g/dL Final    Albumin 02/03/2025 3.8  3.5 - 5.2 g/dL Final    Total Bilirubin 02/03/2025 1.1 (H)  0.1 - 1.0 mg/dL Final    Alkaline Phosphatase 02/03/2025 77  40 - 150 U/L Final    AST 02/03/2025 22  10 - 40 U/L Final    ALT 02/03/2025 20  10 - 44 U/L Final    eGFR 02/03/2025 >60.0  >60 mL/min/1.73 m^2 Final    Anion Gap 02/03/2025 6 (L)  8 - 16 mmol/L Final    Cholesterol 02/03/2025 160  120 - 199 mg/dL Final    Triglycerides 02/03/2025 75  30 - 150 mg/dL Final    HDL 02/03/2025 61  40 - 75 mg/dL Final    LDL Cholesterol 02/03/2025 84.0  63.0 - 159.0 mg/dL Final    HDL/Cholesterol Ratio 02/03/2025 38.1  20.0 - 50.0 % Final    Total Cholesterol/HDL Ratio 02/03/2025 2.6  2.0 - 5.0 Final    Non-HDL Cholesterol 02/03/2025 99  mg/dL Final    TSH 02/03/2025 0.862  0.400 - 4.000 uIU/mL Final    Hemoglobin A1C 02/03/2025 5.4  4.0 - 5.6 % Final    Estimated Avg Glucose 02/03/2025 108  68 - 131 mg/dL Final    Ferritin 02/03/2025 75  20.0 - 300.0 ng/mL Final    Iron 02/03/2025 148  30 - 160 ug/dL Final    Transferrin 02/03/2025 228  200 - 375 mg/dL Final    TIBC 02/03/2025 337  250 - 450 ug/dL Final    Saturated Iron 02/03/2025 44  20 - 50 % Final    Vitamin B-12 02/03/2025 727  180 - 914 ng/L Final     Imaging:  No results found for this or any previous visit.  Note: I have independently reviewed any/all imaging/labs/tests and agree with the report (s) as documented.  Any discrepancies will be as noted/demarcated by free text.  MARTINEZ SMITH 4/2/2025    ASSESSMENT:  1. Migraine without aura and without status migrainosus, not intractable      PLAN:  - For migraine prevention - continue emgality 120 mg SQ monthly (will plan to reload next month's dose)   -  Start topiramate 25 mg nightly - will titrate dose to 100 mg nightly over 4 weeks    - For acute migraines - maxalt 10 mg mlt   - Continue tracking headaches   - RTC in 3 months or sooner if needed     Orders Placed This Encounter    topiramate (TOPAMAX) 50 MG tablet    rizatriptan (MAXALT-MLT) 10 MG disintegrating tablet     Questions and concerns were sought and answered to the patient's stated verbal satisfaction.  The patient verbalizes understanding and agreement with the above stated treatment plan.     Visit today included increased complexity associated with the care of the episodic problem migraine without aura addressed and managing the longitudinal care of the patient due to the serious and/or complex managed problem(s). Plan for patient to return to clinic in 3 months for follow-up visit.     CC: Didi Grewal, ESPERANZA Haas, FNP-C  [facility]   [phone]    Dr. Rashid was available during today's encounter.            [1]   Current Outpatient Medications:     cholecalciferol, vitamin D3, 125 mcg (5,000 unit) Tab, Take by mouth., Disp: , Rfl:     galcanezumab-gnlm (EMGALITY PEN) 120 mg/mL PnIj, Inject 1 mL (120 mg total) into the skin every 28 days. Begin 28 days after loading dose., Disp: 1 mL, Rfl: 11    traZODone (DESYREL) 50 MG tablet, Take 1 tablet (50 mg total) by mouth every evening., Disp: 30 tablet, Rfl: 11    rizatriptan (MAXALT-MLT) 10 MG disintegrating tablet, Take 1 tablet (10 mg total) by mouth every 2 (two) hours as needed for Migraine. Max 30 mg/day., Disp: 12 tablet, Rfl: 5    topiramate (TOPAMAX) 50 MG tablet, Take 1 tablet (50 mg total) by mouth 2 (two) times daily., Disp: 180 tablet, Rfl: 1

## 2025-06-20 ENCOUNTER — TELEPHONE (OUTPATIENT)
Dept: INTERNAL MEDICINE | Facility: CLINIC | Age: 55
End: 2025-06-20

## 2025-06-20 ENCOUNTER — OFFICE VISIT (OUTPATIENT)
Dept: INTERNAL MEDICINE | Facility: CLINIC | Age: 55
End: 2025-06-20
Payer: COMMERCIAL

## 2025-06-20 VITALS
OXYGEN SATURATION: 98 % | DIASTOLIC BLOOD PRESSURE: 64 MMHG | BODY MASS INDEX: 27.28 KG/M2 | WEIGHT: 159.81 LBS | HEART RATE: 80 BPM | HEIGHT: 64 IN | SYSTOLIC BLOOD PRESSURE: 110 MMHG

## 2025-06-20 DIAGNOSIS — B96.89 ACUTE BACTERIAL SINUSITIS: Primary | ICD-10-CM

## 2025-06-20 DIAGNOSIS — J01.90 ACUTE BACTERIAL SINUSITIS: Primary | ICD-10-CM

## 2025-06-20 PROCEDURE — 99999 PR PBB SHADOW E&M-EST. PATIENT-LVL IV: CPT | Mod: PBBFAC,,, | Performed by: NURSE PRACTITIONER

## 2025-06-20 RX ORDER — METHYLPREDNISOLONE 4 MG/1
TABLET ORAL
Qty: 21 TABLET | Refills: 0 | Status: SHIPPED | OUTPATIENT
Start: 2025-06-20

## 2025-06-20 RX ORDER — AMOXICILLIN AND CLAVULANATE POTASSIUM 875; 125 MG/1; MG/1
1 TABLET, FILM COATED ORAL EVERY 12 HOURS
Qty: 14 TABLET | Refills: 0 | Status: SHIPPED | OUTPATIENT
Start: 2025-06-20

## 2025-06-20 RX ORDER — LEVOCETIRIZINE DIHYDROCHLORIDE 5 MG/1
5 TABLET, FILM COATED ORAL NIGHTLY
Qty: 30 TABLET | Refills: 1 | Status: SHIPPED | OUTPATIENT
Start: 2025-06-20 | End: 2026-06-20

## 2025-06-20 NOTE — TELEPHONE ENCOUNTER
Copied from CRM #[id number]. Topic: General Inquiry - Patient Advice  >> Jun 20, 2025  2:41 PM Lc wrote:  Type: Returning a call    Who left a message?Pt     When did the practice call?    Does patient know what this is regarding:    Who called and best call back number:    Would the patient rather a call back or a response via [facility]?     Comments:Pt wants to let the office know that she will be a little late for apt today 6/20/25 @3pm but is on the way. Please call pt to advise [phone]

## 2025-06-20 NOTE — PROGRESS NOTES
INTERNAL MEDICINE PROGRESS/URGENT CARE NOTE    CHIEF COMPLAINT     Chief Complaint   Patient presents with    Sore Throat    Headache       HPI     Chaya Dee is a 55 y.o. female who presents for an urgent visit today.    Congestion, sinus pressure, facial pain worsening for 1 month. Teeth are starting to hurt when she bends forward. R ear pain x 1 week. Sore throat x 1 week.   Tried decongestants, flonase, antihistamine.   No fevers or cough, cp, sob.    Problem List[1]     Past Medical History:  Past Medical History:   Diagnosis Date    Allergy     Chronic allergic rhinitis 3/19/2016    Endometriosis     Laryngopharyngeal reflux disease 03/2018    PONV (postoperative nausea and vomiting)     Recurrent upper respiratory infection (URI)         Past Surgical History:  Past Surgical History:   Procedure Laterality Date    ABDOMINAL SURGERY      endometrosis    ADENOIDECTOMY      INJECTION OF ANESTHETIC AGENT AROUND NERVE Bilateral 08/28/2023    Procedure: BLOCK, NERVE, BILATERAL L3-L4-L5 MEDIAL BRANCH SOONER DATE;  Surgeon: Andra Reich MD;  Location: [facility];  Service: Pain Management;  Laterality: Bilateral;    INJECTION OF ANESTHETIC AGENT AROUND NERVE N/A 11/13/2023    Procedure: BLOCK, GANGLION IMPAR;  Surgeon: Andra Reich MD;  Location: [facility];  Service: Pain Management;  Laterality: N/A;    INJECTION OF FACET JOINT Right 05/11/2023    Procedure: INJECTION, FACET JOINT RIGHT L5/S1;  Surgeon: Andra Reich MD;  Location: [facility];  Service: Pain Management;  Laterality: Right;    INJECTION, SACROILIAC JOINT Right 10/05/2023    Procedure: INJECTION,SACROILIAC JOINT, RIGHT  SOONER DATE;  Surgeon: Andra Reich MD;  Location: [facility];  Service: Pain Management;  Laterality: Right;    NASAL SEPTUM SURGERY      ROTATOR CUFF REPAIR Right 03/01/2024    SHOULDER SURGERY      SINUS SURGERY      TONSILLECTOMY          Allergies:  Review of patient's allergies indicates:   Allergen  "Reactions    Latex Other (See Comments)       Home Medications:  Current Medications[2]     Review of Systems:  Review of Systems   Constitutional:  Negative for fever.   HENT:  Positive for congestion, ear pain, postnasal drip, sinus pressure, sinus pain and sore throat. Negative for facial swelling, mouth sores, rhinorrhea and trouble swallowing.    Eyes:  Negative for discharge, redness and itching.   Respiratory:  Negative for cough, shortness of breath, wheezing and stridor.    Cardiovascular:  Negative for chest pain.   Gastrointestinal:  Negative for abdominal pain, diarrhea, nausea and vomiting.   Musculoskeletal:  Negative for myalgias.   Neurological:  Positive for headaches. Negative for dizziness and weakness.         PHYSICAL EXAM     Vitals:    06/20/25 1523   BP: 110/64   BP Location: Right arm   Patient Position: Sitting   Pulse: 80   SpO2: 98%   Weight: 72.5 kg (159 lb 13.3 oz)   Height: 5' 4" (1.626 m)      Body mass index is 27.44 kg/m².     Physical Exam  Vitals reviewed.   Constitutional:       Appearance: Normal appearance.   HENT:      Head: Normocephalic and atraumatic.      Right Ear: Tympanic membrane and ear canal normal.      Left Ear: Tympanic membrane and ear canal normal.      Nose: Congestion present.      Right Sinus: Maxillary sinus tenderness present.      Left Sinus: Maxillary sinus tenderness present.      Mouth/Throat:      Mouth: Mucous membranes are moist.      Pharynx: Oropharynx is clear. Uvula midline. Posterior oropharyngeal erythema present. No oropharyngeal exudate or uvula swelling.   Eyes:      Conjunctiva/sclera: Conjunctivae normal.      Pupils: Pupils are equal, round, and reactive to light.   Cardiovascular:      Rate and Rhythm: Normal rate and regular rhythm.      Heart sounds: Normal heart sounds.   Pulmonary:      Effort: Pulmonary effort is normal.      Breath sounds: Normal breath sounds.   Musculoskeletal:      Cervical back: Normal range of motion and neck " supple.   Lymphadenopathy:      Cervical: No cervical adenopathy.   Skin:     General: Skin is warm and dry.      Findings: No rash.   Neurological:      General: No focal deficit present.      Mental Status: She is alert.   Psychiatric:         Mood and Affect: Mood normal.         Behavior: Behavior normal.         LABS     Lab Results   Component Value Date    HGBA1C 5.4 02/03/2025     CMP  Sodium   Date Value Ref Range Status   02/03/2025 141 136 - 145 mmol/L Final     Potassium   Date Value Ref Range Status   02/03/2025 4.7 3.5 - 5.1 mmol/L Final     Chloride   Date Value Ref Range Status   02/03/2025 109 95 - 110 mmol/L Final     CO2   Date Value Ref Range Status   02/03/2025 26 23 - 29 mmol/L Final     Glucose   Date Value Ref Range Status   02/03/2025 97 70 - 110 mg/dL Final     BUN   Date Value Ref Range Status   02/03/2025 15 6 - 20 mg/dL Final     Creatinine   Date Value Ref Range Status   02/03/2025 0.7 0.5 - 1.4 mg/dL Final     Calcium   Date Value Ref Range Status   02/03/2025 9.2 8.7 - 10.5 mg/dL Final     Total Protein   Date Value Ref Range Status   02/03/2025 6.3 6.0 - 8.4 g/dL Final     Albumin   Date Value Ref Range Status   02/03/2025 3.8 3.5 - 5.2 g/dL Final     Total Bilirubin   Date Value Ref Range Status   02/03/2025 1.1 (H) 0.1 - 1.0 mg/dL Final     Comment:     For infants and newborns, interpretation of results should be based  on gestational age, weight and in agreement with clinical  observations.    Premature Infant recommended reference ranges:  Up to 24 hours.............<8.0 mg/dL  Up to 48 hours............<12.0 mg/dL  3-5 days..................<15.0 mg/dL  6-29 days.................<15.0 mg/dL       Alkaline Phosphatase   Date Value Ref Range Status   02/03/2025 77 40 - 150 U/L Final     AST   Date Value Ref Range Status   02/03/2025 22 10 - 40 U/L Final     ALT   Date Value Ref Range Status   02/03/2025 20 10 - 44 U/L Final     Anion Gap   Date Value Ref Range Status   02/03/2025  6 (L) 8 - 16 mmol/L Final     eGFR if    Date Value Ref Range Status   04/03/2016 >60.0 >60 mL/min/1.73 m^2 Final     eGFR if non    Date Value Ref Range Status   04/03/2016 >60.0 >60 mL/min/1.73 m^2 Final     Comment:     Calculation used to obtain the estimated glomerular filtration  rate (eGFR) is the CKD-EPI equation. Since race is unknown   in our information system, the eGFR values for   -American and Non--American patients are given   for each creatinine result.       Lab Results   Component Value Date    WBC 4.44 02/03/2025    HGB 12.6 02/03/2025    HCT 38.5 02/03/2025    MCV 89 02/03/2025     02/03/2025     Lab Results   Component Value Date    CHOL 160 02/03/2025    CHOL 177 01/27/2024    CHOL 218 (H) 04/03/2016     Lab Results   Component Value Date    HDL 61 02/03/2025    HDL 64 01/27/2024    HDL 62 04/03/2016     Lab Results   Component Value Date    LDLCALC 84.0 02/03/2025    LDLCALC 91.4 01/27/2024    LDLCALC 133.0 04/03/2016     Lab Results   Component Value Date    TRIG 75 02/03/2025    TRIG 108 01/27/2024    TRIG 115 04/03/2016     Lab Results   Component Value Date    CHOLHDL 38.1 02/03/2025    CHOLHDL 36.2 01/27/2024    CHOLHDL 28.4 04/03/2016     Lab Results   Component Value Date    TSH 0.862 02/03/2025       ASSESSMENT     1. Acute bacterial sinusitis           PLAN    1. Acute bacterial sinusitis  - methylPREDNISolone (MEDROL DOSEPACK) 4 mg tablet; follow package directions  Dispense: 21 tablet; Refill: 0  - amoxicillin-clavulanate 875-125mg (AUGMENTIN) 875-125 mg per tablet; Take 1 tablet by mouth every 12 (twelve) hours.  Dispense: 14 tablet; Refill: 0  - levocetirizine (XYZAL) 5 MG tablet; Take 1 tablet (5 mg total) by mouth every evening.  Dispense: 30 tablet; Refill: 1   Start meds as send above.  Tylenol or ibuprofen p.r.n. pain decongestants give her palpitations and make her feel like she is coming out of her skin.  Recommend  avoiding those.  Hydrate well.  Follow up with PCP for failure to improve or any worsening    Patient's plan/treatment was discussed including medications and possible side effects. Verbalized understanding of all instructions.     This note was partly generated with [employer] voice recognition software. I apologize for any possible typographical errors.          MARTINEZ Crook  Department of Internal Medicine - [facility]  06/20/2025          [1]   Patient Active Problem List  Diagnosis    Frontal headache    Chronic allergic rhinitis    Chronic ethmoidal sinusitis    Rheumatoid arthritis    Acute pelvic pain, female    Diffuse cystic mastopathy of both breasts    Endometriosis determined by laparoscopy    Injury of conjunctiva and corneal abrasion without foreign body, unspecified eye, initial encounter    Mass of finger of left hand    Migraine without aura and without status migrainosus, not intractable    Chronic neck and back pain    Cervicogenic headache    Decreased range of motion of neck    Impaired strength of neck muscles    Poor posture    Sacroiliitis    Hepatic cyst    Insomnia   [2]   Current Outpatient Medications:     cholecalciferol, vitamin D3, 125 mcg (5,000 unit) Tab, Take by mouth., Disp: , Rfl:     galcanezumab-gnlm (EMGALITY PEN) 120 mg/mL PnIj, Inject 1 mL (120 mg total) into the skin every 28 days. Begin 28 days after loading dose., Disp: 1 mL, Rfl: 11    rizatriptan (MAXALT-MLT) 10 MG disintegrating tablet, Take 1 tablet (10 mg total) by mouth every 2 (two) hours as needed for Migraine. Max 30 mg/day., Disp: 12 tablet, Rfl: 5    topiramate (TOPAMAX) 50 MG tablet, Take 1 tablet (50 mg total) by mouth 2 (two) times daily., Disp: 180 tablet, Rfl: 1    traZODone (DESYREL) 50 MG tablet, Take 1 tablet (50 mg total) by mouth every evening., Disp: 30 tablet, Rfl: 11    amoxicillin-clavulanate 875-125mg (AUGMENTIN) 875-125 mg per tablet, Take 1 tablet by mouth every 12 (twelve)  hours., Disp: 14 tablet, Rfl: 0    levocetirizine (XYZAL) 5 MG tablet, Take 1 tablet (5 mg total) by mouth every evening., Disp: 30 tablet, Rfl: 1    methylPREDNISolone (MEDROL DOSEPACK) 4 mg tablet, follow package directions, Disp: 21 tablet, Rfl: 0

## 2025-07-07 ENCOUNTER — OFFICE VISIT (OUTPATIENT)
Facility: CLINIC | Age: 55
End: 2025-07-07
Payer: COMMERCIAL

## 2025-07-07 VITALS
SYSTOLIC BLOOD PRESSURE: 122 MMHG | HEART RATE: 81 BPM | BODY MASS INDEX: 27.62 KG/M2 | WEIGHT: 160.94 LBS | DIASTOLIC BLOOD PRESSURE: 77 MMHG

## 2025-07-07 DIAGNOSIS — G43.009 MIGRAINE WITHOUT AURA AND WITHOUT STATUS MIGRAINOSUS, NOT INTRACTABLE: Primary | ICD-10-CM

## 2025-07-07 PROCEDURE — 99999 PR PBB SHADOW E&M-EST. PATIENT-LVL III: CPT | Mod: PBBFAC,,, | Performed by: NURSE PRACTITIONER

## 2025-07-07 PROCEDURE — G2211 COMPLEX E/M VISIT ADD ON: HCPCS | Mod: S$GLB,,, | Performed by: NURSE PRACTITIONER

## 2025-07-07 PROCEDURE — 99214 OFFICE O/P EST MOD 30 MIN: CPT | Mod: S$GLB,,, | Performed by: NURSE PRACTITIONER

## 2025-07-07 RX ORDER — TOPIRAMATE 50 MG/1
50 TABLET, FILM COATED ORAL 2 TIMES DAILY
Qty: 180 TABLET | Refills: 1 | Status: SHIPPED | OUTPATIENT
Start: 2025-07-07

## 2025-07-07 NOTE — PROGRESS NOTES
Established Patient   SUBJECTIVE:  Patient ID: Chaya Dee   Chief Complaint: Follow-up    History of Present Illness:  Chaya Dee is a 55 y.o. female who presents to clinic alone for follow-up of headaches.     Recommendations made at last Office Visit on 4/2/2025:  - For migraine prevention - continue emgality 120 mg SQ monthly (will plan to reload next month's dose)   - Start topiramate 25 mg nightly - will titrate dose to 100 mg nightly over 4 weeks    - For acute migraines - maxalt 10 mg mlt   - Continue tracking headaches   - RTC in 3 months or sooner if needed     07/07/2025 - Interval History:  Migraines have been much improved on emgality 120 mg SQ monthly and topiramate 100 mg nightly.  Overall, patient expresses satisfaction with current control.  Headaches are not interfering with life or function.  Does not wish to make adjustments to treatment plan at this time.     Otherwise, information below is still accurate and current.     04/02/2025 - Interval History:  Daily headaches for the last 2-3 months, last month being the worst, had a migraine associated with vomiting.  Prior to 2-3 months ago, migraines had been very well controlled.  She is not able to identify any trigger or cause for the worsening of her migraines.  She has continued emgality 120 mg SQ monthly for migraine prevention.  Successfully tapered off topiramate, and migraines remained stable for months.  At present, she would like to consider restarting topiramate as she feels migraines were much better controlled with combo topiramate/emgality.      CURRENT REGIMEN:  PPX - emgality  Abortive - maxalt 10 mg mlt      Otherwise, information below is still accurate and current.      03/06/2024 - Interval History:  Migrianes have been under great control, has only had 3 migraines since last visit (9 months ago).      She has continued emgality 120 mg SQ monthly and topiramate 50 mg bid for migraine prevention, would be  "interested in beginning to taper topiramate.      Otherwise, information below is still accurate and current.      06/23/2023 - Interval History:  Only two headaches since her last visit 3 months ago!  Emgality has been working "great", migraines only lasted a couple hours in duration.  She remains very pleased with the current state of her migraines, does not wish to make adjustments to her treatment plan.     Otherwise, information below is still accurate and current.      03/10/2023 - Interval History:  Has completed loading dose emgality and one maintenance dose of emgality, due for second maintenance dose in two days.  Since starting Emgality, migraines have greatly improved, went from having a daily headache down to only 5 headache days per month.  Headaches are lasting only an hour in duration.  Feels headaches are "totally manageable".    Completed healthy back and neck physical therapy two days ago, has f/up appt later today with pain medicine to decide how to proceed as far as procedures/injections/nerve blocks etc.       Otherwise, information below is still accurate and current.      12/28/2022 - Interval History:  She has continued suffering with near daily headaches which are present all day, everyday.  Having two different types of headaches, one of which is throbbing, one sided.  Feels headaches are manageable at this point and not interfering with her ability to continue day to day activities.  She is currently taking topiramate 50 mg bid for headache prevention, she does feel this has helped to decrease the intensity of her headaches.  Taking tizanidine 2-4 mg only on occasion.  Has tried flexeril in the past which she did not feel provided her with any relief.  She is enrolled in healthy back program, has been going to physical therapy routinely without any noticeable improvement.   Continues to feel her neck is playing a large role in her headaches, will do stretches recommended by PT which helps " for a short while before she feels her neck/upper back muscles begin to start tightening back up.      Otherwise, information below is still accurate and current.      09/21/2022 - Interval History:  She again attempted to taper topiramate beginning the day after her last visit, noticed she began to experience more frequent headaches and migraines became more intense so went back to taking 50 mg nightly (has been back on 50 mg nightly dose for over 2 months now).    Currently suffering with a headache on average 3 days per week, the majority of which come on in the afternoon, before dinnertime, maybe around 2 pm.  If she treats with maxalt 10 mg mlt migraine will last a couple of hours, otherwise will last the remainder of the day, occasionally will carry into the following day.    Headaches began to occur more frequently around July.    Stops drinking caffeine around 10:30 am, drinks at least 2 cups of coffee before this.       Sleep is poor - has trouble falling asleep and staying asleep. On average sleeps 4 hours per night. Does not feel rested upon waking.  Once she gets into the bed feels her mind will start to race, has a hard time turning her mind off.      Otherwise, information below is still accurate and current.      06/20/2022 - Interval History:  Tapered topiramate to 25 mg nightly which she had been taking nightly from end of March until a few weeks ago.  Noticed she was starting to experience more frequent migraines and subsequently decided to increase topiramate back to 50 mg nightly.  Since then, migraines have been back under control.  She continues to treat acute migraines with rizatriptan.  She would like to eventually get off topiramate.       Otherwise, information below is still accurate and current.      03/30/2022 - Interval History:  Migraines have been much improved since starting topiramate.  Currently suffering with only one migraine per month each of which lasts about 1 day.  She is  "taking topiramate 50 mg nightly for migraine prevention.  Only rarely takes maxalt for acute migraines.  She is very pleased with the improvement in her migraines.  Would like to attempt to taper off topiramate.       Otherwise, information below is still accurate and current.      History of Present Illness:   51 y.o. female with chronic allergic rhinitis, headaches and rheumatoid arthritis, who presents to clinic alone for evaluation of headaches.  Long history of sinus issues, with chronic daily headaches/facial pain.  Reports once per week she experiences unbearable pain which persists all day in duration.  Headaches are described as a moderate to severe, pressure and pulsating pain "like I'm wearing pain goggles" located bilateral cheeks, forehead, and temporalis bilaterally.  Headaches can last anywhere from an hour up to all day in duration.   Pain can be rated anywhere from 3 to 8 out of 10, intensifying to peak over more than an hour.  Made worse by physical movement.  Associated symptoms include congestion, photophobia, nausea (only "once in a blue moon").  She does have a history of migraines in her past, which were hormonally driven, migraines resolved after she completed hormone therapy.  She has not been able to identify any trigger.  On days when headaches are worse, she is able to continue working however finds it more difficult to concentrate.       Treatments Tried and Response  Sumatriptan   Amitriptyline -   Gabapentin 200 mg qhs - for RA  celebrex - for RA  topiramate - helping   maxalt 10 mg mlt - helps   Baclofen -   Emgality -   Topiramate -     Current Medications:  Current Medications[1]    Review of Systems - A review of 10+ systems was conducted with pertinent positive and negative findings documented in HPI with all other systems reviewed and negative.    PFSH: Past medical, family, and social history reviewed as documented in chart with pertinent positive medical, family, and social " history detailed in HPI.    OBJECTIVE:  Vitals:  /77 (Patient Position: Sitting)   Pulse 81   Wt 73 kg (160 lb 15 oz)   BMI 27.62 kg/m²      Physical Exam:  Constitutional: she appears well-developed and well-nourished. she is well groomed. NAD     Review of Data:   Notes from internal medicine, opthalmology reviewed   Labs:  Office Visit on 02/23/2025   Component Date Value Ref Range Status    POC Molecular Influenza A Ag 02/23/2025 Negative  Negative Final    POC Molecular Influenza B Ag 02/23/2025 Negative  Negative Final     Acceptable 02/23/2025 Yes   Final    SARS Coronavirus 2 Antigen 02/23/2025 Negative  Negative, Presumptive Negative Final     Acceptable 02/23/2025 Yes   Final   Lab Visit on 02/03/2025   Component Date Value Ref Range Status    WBC 02/03/2025 4.44  3.90 - 12.70 K/uL Final    RBC 02/03/2025 4.33  4.00 - 5.40 M/uL Final    Hemoglobin 02/03/2025 12.6  12.0 - 16.0 g/dL Final    Hematocrit 02/03/2025 38.5  37.0 - 48.5 % Final    MCV 02/03/2025 89  82 - 98 fL Final    MCH 02/03/2025 29.1  27.0 - 31.0 pg Final    MCHC 02/03/2025 32.7  32.0 - 36.0 g/dL Final    RDW 02/03/2025 12.7  11.5 - 14.5 % Final    Platelets 02/03/2025 179  150 - 450 K/uL Final    MPV 02/03/2025 10.9  9.2 - 12.9 fL Final    Immature Granulocytes 02/03/2025 0.2  0.0 - 0.5 % Final    Gran # (ANC) 02/03/2025 2.6  1.8 - 7.7 K/uL Final    Immature Grans (Abs) 02/03/2025 0.01  0.00 - 0.04 K/uL Final    Lymph # 02/03/2025 1.4  1.0 - 4.8 K/uL Final    Mono # 02/03/2025 0.3  0.3 - 1.0 K/uL Final    Eos # 02/03/2025 0.1  0.0 - 0.5 K/uL Final    Baso # 02/03/2025 0.03  0.00 - 0.20 K/uL Final    nRBC 02/03/2025 0  0 /100 WBC Final    Gran % 02/03/2025 59.3  38.0 - 73.0 % Final    Lymph % 02/03/2025 30.4  18.0 - 48.0 % Final    Mono % 02/03/2025 7.4  4.0 - 15.0 % Final    Eosinophil % 02/03/2025 2.0  0.0 - 8.0 % Final    Basophil % 02/03/2025 0.7  0.0 - 1.9 % Final    Differential Method 02/03/2025  Automated   Final    Sodium 02/03/2025 141  136 - 145 mmol/L Final    Potassium 02/03/2025 4.7  3.5 - 5.1 mmol/L Final    Chloride 02/03/2025 109  95 - 110 mmol/L Final    CO2 02/03/2025 26  23 - 29 mmol/L Final    Glucose 02/03/2025 97  70 - 110 mg/dL Final    BUN 02/03/2025 15  6 - 20 mg/dL Final    Creatinine 02/03/2025 0.7  0.5 - 1.4 mg/dL Final    Calcium 02/03/2025 9.2  8.7 - 10.5 mg/dL Final    Total Protein 02/03/2025 6.3  6.0 - 8.4 g/dL Final    Albumin 02/03/2025 3.8  3.5 - 5.2 g/dL Final    Total Bilirubin 02/03/2025 1.1 (H)  0.1 - 1.0 mg/dL Final    Alkaline Phosphatase 02/03/2025 77  40 - 150 U/L Final    AST 02/03/2025 22  10 - 40 U/L Final    ALT 02/03/2025 20  10 - 44 U/L Final    eGFR 02/03/2025 >60.0  >60 mL/min/1.73 m^2 Final    Anion Gap 02/03/2025 6 (L)  8 - 16 mmol/L Final    Cholesterol 02/03/2025 160  120 - 199 mg/dL Final    Triglycerides 02/03/2025 75  30 - 150 mg/dL Final    HDL 02/03/2025 61  40 - 75 mg/dL Final    LDL Cholesterol 02/03/2025 84.0  63.0 - 159.0 mg/dL Final    HDL/Cholesterol Ratio 02/03/2025 38.1  20.0 - 50.0 % Final    Total Cholesterol/HDL Ratio 02/03/2025 2.6  2.0 - 5.0 Final    Non-HDL Cholesterol 02/03/2025 99  mg/dL Final    TSH 02/03/2025 0.862  0.400 - 4.000 uIU/mL Final    Hemoglobin A1C 02/03/2025 5.4  4.0 - 5.6 % Final    Estimated Avg Glucose 02/03/2025 108  68 - 131 mg/dL Final    Ferritin 02/03/2025 75  20.0 - 300.0 ng/mL Final    Iron 02/03/2025 148  30 - 160 ug/dL Final    Transferrin 02/03/2025 228  200 - 375 mg/dL Final    TIBC 02/03/2025 337  250 - 450 ug/dL Final    Saturated Iron 02/03/2025 44  20 - 50 % Final    Vitamin B-12 02/03/2025 727  180 - 914 ng/L Final     Imaging:  No results found for this or any previous visit.  Note: I have independently reviewed any/all imaging/labs/tests and agree with the report (s) as documented.  Any discrepancies will be as noted/demarcated by free text.  MARTINEZ SMITH 7/7/2025    ASSESSMENT:  1. Migraine without aura  and without status migrainosus, not intractable      PLAN:  - For migraine prevention - continue emgality 120 mg SQ monthly and topiramate 100 mg nightly   - For treatment of acute migraine - maxalt 10 mg mlt   - Continue tracking headaches   - RTC in 6 months or sooner if needed         Questions and concerns were sought and answered to the patient's stated verbal satisfaction.  The patient verbalizes understanding and agreement with the above stated treatment plan.     Visit today included increased complexity associated with the care of the episodic problem migraine without aura addressed and managing the longitudinal care of the patient due to the serious and/or complex managed problem(s). Plan for patient to return to clinic in 6 months for follow-up visit.     CC: Didi Grewal, ESPERAN[facility]  [facility]   [phone]    Dr. Rashid was available during today's encounter.            [1]   Current Outpatient Medications:     cholecalciferol, vitamin D3, 125 mcg (5,000 unit) Tab, Take by mouth., Disp: , Rfl:     galcanezumab-gnlm (EMGALITY PEN) 120 mg/mL PnIj, Inject 1 mL (120 mg total) into the skin every 28 days. Begin 28 days after loading dose., Disp: 1 mL, Rfl: 11    levocetirizine (XYZAL) 5 MG tablet, Take 1 tablet (5 mg total) by mouth every evening., Disp: 30 tablet, Rfl: 1    rizatriptan (MAXALT-MLT) 10 MG disintegrating tablet, Take 1 tablet (10 mg total) by mouth every 2 (two) hours as needed for Migraine. Max 30 mg/day., Disp: 12 tablet, Rfl: 5    topiramate (TOPAMAX) 50 MG tablet, Take 1 tablet (50 mg total) by mouth 2 (two) times daily., Disp: 180 tablet, Rfl: 1    traZODone (DESYREL) 50 MG tablet, Take 1 tablet (50 mg total) by mouth every evening., Disp: 30 tablet, Rfl: 11    amoxicillin-clavulanate 875-125mg (AUGMENTIN) 875-125 mg per tablet, Take 1 tablet by mouth every 12 (twelve) hours., Disp: 14 tablet, Rfl: 0    methylPREDNISolone (MEDROL  DOSEPACK) 4 mg tablet, follow package directions, Disp: 21 tablet, Rfl: 0

## (undated) DEVICE — DRESSING LEUKOPLAST FLEX 1X3IN